# Patient Record
Sex: MALE | Race: WHITE | NOT HISPANIC OR LATINO | Employment: OTHER | URBAN - METROPOLITAN AREA
[De-identification: names, ages, dates, MRNs, and addresses within clinical notes are randomized per-mention and may not be internally consistent; named-entity substitution may affect disease eponyms.]

---

## 2023-04-04 LAB
ALBUMIN (G/DL) IN SER/PLAS: 4.2 G/DL (ref 3.4–5)
ANION GAP IN SER/PLAS: 10 MMOL/L (ref 10–20)
CALCIUM (MG/DL) IN SER/PLAS: 9.4 MG/DL (ref 8.6–10.3)
CARBON DIOXIDE, TOTAL (MMOL/L) IN SER/PLAS: 29 MMOL/L (ref 21–32)
CHLORIDE (MMOL/L) IN SER/PLAS: 93 MMOL/L (ref 98–107)
CREATININE (MG/DL) IN SER/PLAS: 0.96 MG/DL (ref 0.5–1.3)
GFR MALE: 85 ML/MIN/1.73M2
GLUCOSE (MG/DL) IN SER/PLAS: 92 MG/DL (ref 74–99)
PHOSPHATE (MG/DL) IN SER/PLAS: 3.6 MG/DL (ref 2.5–4.9)
POTASSIUM (MMOL/L) IN SER/PLAS: 4.9 MMOL/L (ref 3.5–5.3)
SODIUM (MMOL/L) IN SER/PLAS: 127 MMOL/L (ref 136–145)
UREA NITROGEN (MG/DL) IN SER/PLAS: 13 MG/DL (ref 6–23)

## 2023-04-08 LAB — OXCARB OR ESLICARB METABOLITE (MHD): 18 UG/ML (ref 3–35)

## 2023-04-21 LAB
ALBUMIN (G/DL) IN SER/PLAS: 4.1 G/DL (ref 3.4–5)
ANION GAP IN SER/PLAS: 13 MMOL/L (ref 10–20)
CALCIUM (MG/DL) IN SER/PLAS: 9.9 MG/DL (ref 8.6–10.3)
CARBON DIOXIDE, TOTAL (MMOL/L) IN SER/PLAS: 25 MMOL/L (ref 21–32)
CHLORIDE (MMOL/L) IN SER/PLAS: 104 MMOL/L (ref 98–107)
CREATININE (MG/DL) IN SER/PLAS: 1.2 MG/DL (ref 0.5–1.3)
GFR MALE: 65 ML/MIN/1.73M2
GLUCOSE (MG/DL) IN SER/PLAS: 93 MG/DL (ref 74–99)
PHOSPHATE (MG/DL) IN SER/PLAS: 3.6 MG/DL (ref 2.5–4.9)
POTASSIUM (MMOL/L) IN SER/PLAS: 4.7 MMOL/L (ref 3.5–5.3)
SODIUM (MMOL/L) IN SER/PLAS: 137 MMOL/L (ref 136–145)
UREA NITROGEN (MG/DL) IN SER/PLAS: 18 MG/DL (ref 6–23)

## 2023-04-25 LAB — ZONISAMIDE: 5.9 MCG/ML (ref 10–40)

## 2023-05-13 LAB — ZONISAMIDE: 14 MCG/ML (ref 10–40)

## 2023-05-23 ENCOUNTER — PATIENT OUTREACH (OUTPATIENT)
Dept: CARE COORDINATION | Facility: CLINIC | Age: 73
End: 2023-05-23
Payer: MEDICARE

## 2023-05-23 NOTE — PROGRESS NOTES
"Outreach call to patient's wife Nadeen to support a smooth transition of care from recent admission.  Spoke with Nadeen, reviewed discharge medications, discharge instructions, assessed social needs, and provided education on importance of follow-up appointment with provider.  Will continue to monitor through transition period.    Engagement  Call Start Time: 1128 (5/23/2023 11:34 AM)    Medications  Medications reviewed with patient/caregiver?: Yes (Reviewed with patient's wife Nadeen) (5/23/2023 11:34 AM)  Is the patient having any side effects they believe may be caused by any medication additions or changes?: (!) Yes (Patient's wife, Nadeen, states that he has a decreased appetite and he is \"pretty tired\"  Nadeen states they are going to talk to the doctor about his symptoms.) (5/23/2023 11:34 AM)  Does the patient have all medications ordered at discharge?: Yes (5/23/2023 11:34 AM)  Care Management Interventions: No intervention needed (5/23/2023 11:34 AM)  Is the patient taking all medications as directed (includes completed medication regime)?: Yes (5/23/2023 11:34 AM)    Appointments  Does the patient have a primary care provider?: Yes (Appointment scheduled for 6/1/2023) (5/23/2023 11:34 AM)  Care Management Interventions: Verified appointment date/time/provider (5/23/2023 11:34 AM)  Has the patient kept scheduled appointments due by today?: Yes (5/23/2023 11:34 AM)    Self Management  What is the home health agency?: Not Applicable (5/23/2023 11:34 AM)  What Durable Medical Equipment (DME) was ordered?: Not Applicable (5/23/2023 11:34 AM)    Patient Teaching  Does the patient have access to their discharge instructions?: -- (Reviewed with patient's wife Nadeen) (5/23/2023 11:34 AM)  Care Management Interventions: Reviewed instructions with patient (5/23/2023 11:34 AM)  What is the patient's perception of their health status since discharge?: Improving (5/23/2023 11:34 AM)  Is the " patient/caregiver able to teach back the hierarchy of who to call/visit for symptoms/problems? PCP, Specialist, Home Health nurse, Urgent Care, ED, 911: Yes (5/23/2023 11:34 AM)      NRB

## 2023-05-31 ENCOUNTER — PATIENT OUTREACH (OUTPATIENT)
Dept: CARE COORDINATION | Facility: CLINIC | Age: 73
End: 2023-05-31
Payer: MEDICARE

## 2023-05-31 NOTE — PROGRESS NOTES
"Outreach call to patient to support a smooth transition of care from recent admission. Patient presented to ED on 5/30/2023 with feelings of \"fogginess\" and generalized fatigue and weakness.  Patient reports that the symptoms started shortly after starting a new seizure medication 1 week ago.  Denies any seizures since starting the medication.  CM spoke with patient's wife Nadeen and states that Paddy is being weaned off of the Depakote and will be restarted on Keppra. Nadeen states he is still \"pretty much the same as when he went to the ED. Nadeen states they were told \"there will be a low before the high\" as they go through this transition.  Will continue to monitor through transition period. No needs at this time. CM will continue to follow.  NRB  "

## 2023-06-06 LAB
ALANINE AMINOTRANSFERASE (SGPT) (U/L) IN SER/PLAS: 23 U/L (ref 10–52)
ALBUMIN (G/DL) IN SER/PLAS: 4 G/DL (ref 3.4–5)
ALKALINE PHOSPHATASE (U/L) IN SER/PLAS: 86 U/L (ref 33–136)
ANION GAP IN SER/PLAS: 13 MMOL/L (ref 10–20)
ASPARTATE AMINOTRANSFERASE (SGOT) (U/L) IN SER/PLAS: 19 U/L (ref 9–39)
BILIRUBIN TOTAL (MG/DL) IN SER/PLAS: 0.1 MG/DL (ref 0–1.2)
CALCIUM (MG/DL) IN SER/PLAS: 9.6 MG/DL (ref 8.6–10.3)
CARBON DIOXIDE, TOTAL (MMOL/L) IN SER/PLAS: 27 MMOL/L (ref 21–32)
CHLORIDE (MMOL/L) IN SER/PLAS: 105 MMOL/L (ref 98–107)
CHOLESTEROL (MG/DL) IN SER/PLAS: 189 MG/DL (ref 0–199)
CHOLESTEROL IN HDL (MG/DL) IN SER/PLAS: 42.4 MG/DL
CHOLESTEROL/HDL RATIO: 4.5
CREATININE (MG/DL) IN SER/PLAS: 1.2 MG/DL (ref 0.5–1.3)
GFR MALE: 65 ML/MIN/1.73M2
GLUCOSE (MG/DL) IN SER/PLAS: 88 MG/DL (ref 74–99)
LDL: 113 MG/DL (ref 0–99)
POTASSIUM (MMOL/L) IN SER/PLAS: 4.2 MMOL/L (ref 3.5–5.3)
PROSTATE SPECIFIC AG (NG/ML) IN SER/PLAS: 1.78 NG/ML (ref 0–4)
PROTEIN TOTAL: 6.8 G/DL (ref 6.4–8.2)
SODIUM (MMOL/L) IN SER/PLAS: 141 MMOL/L (ref 136–145)
TRIGLYCERIDE (MG/DL) IN SER/PLAS: 166 MG/DL (ref 0–149)
UREA NITROGEN (MG/DL) IN SER/PLAS: 41 MG/DL (ref 6–23)
VLDL: 33 MG/DL (ref 0–40)

## 2023-06-09 ENCOUNTER — PATIENT OUTREACH (OUTPATIENT)
Dept: CARE COORDINATION | Facility: CLINIC | Age: 73
End: 2023-06-09
Payer: MEDICARE

## 2023-06-09 NOTE — PROGRESS NOTES
"Outreach follow up call.  CM spoke with spouse Nadeen. Nadeen states he is getting better each day. States he is still \" a little spacey as he is transitioning his medications.\" Nadeen states she is happy to see him get  better.  No questions at this time    Tayla Santo RN/EMELI    "

## 2023-06-19 LAB
ALBUMIN (G/DL) IN SER/PLAS: 3.7 G/DL (ref 3.4–5)
ANION GAP IN SER/PLAS: 13 MMOL/L (ref 10–20)
CALCIUM (MG/DL) IN SER/PLAS: 9.1 MG/DL (ref 8.6–10.3)
CARBON DIOXIDE, TOTAL (MMOL/L) IN SER/PLAS: 20 MMOL/L (ref 21–32)
CHLORIDE (MMOL/L) IN SER/PLAS: 109 MMOL/L (ref 98–107)
CREATININE (MG/DL) IN SER/PLAS: 1.06 MG/DL (ref 0.5–1.3)
GFR MALE: 75 ML/MIN/1.73M2
GLUCOSE (MG/DL) IN SER/PLAS: 88 MG/DL (ref 74–99)
PHOSPHATE (MG/DL) IN SER/PLAS: 3.4 MG/DL (ref 2.5–4.9)
POTASSIUM (MMOL/L) IN SER/PLAS: 4.3 MMOL/L (ref 3.5–5.3)
SODIUM (MMOL/L) IN SER/PLAS: 138 MMOL/L (ref 136–145)
UREA NITROGEN (MG/DL) IN SER/PLAS: 22 MG/DL (ref 6–23)

## 2023-06-23 ENCOUNTER — PATIENT OUTREACH (OUTPATIENT)
Dept: CARE COORDINATION | Facility: CLINIC | Age: 73
End: 2023-06-23
Payer: MEDICARE

## 2023-06-23 NOTE — PROGRESS NOTES
Outreach call to patient to support a smooth transition of care from recent admission.  Spoke with Nadeen, Paddy has completed the third medication (Depakote) and is now on the two he is suppose to be taking. Nadeen states, he is not longer confused or fuzzy. Nadeen states, he needs to get his energy back. Nadeen states, overall he is doing really good.  Will continue to monitor through transition period.    Tayla Santo RN/CM

## 2023-07-25 ENCOUNTER — PATIENT OUTREACH (OUTPATIENT)
Dept: CARE COORDINATION | Facility: CLINIC | Age: 73
End: 2023-07-25
Payer: MEDICARE

## 2023-07-25 NOTE — PROGRESS NOTES
Monthly outreach call to patient to support a smooth transition of care from recent admission.  Left voicemail message for patient with my contact information.Will continue to monitor through transition period.    Tayla Santo RN/CM

## 2023-08-24 ENCOUNTER — PATIENT OUTREACH (OUTPATIENT)
Dept: CARE COORDINATION | Facility: CLINIC | Age: 73
End: 2023-08-24
Payer: MEDICARE

## 2023-10-09 ENCOUNTER — TELEPHONE (OUTPATIENT)
Dept: NEUROLOGY | Facility: CLINIC | Age: 73
End: 2023-10-09
Payer: MEDICARE

## 2023-10-09 DIAGNOSIS — R56.9 SEIZURE (MULTI): Primary | ICD-10-CM

## 2023-10-09 RX ORDER — MIDAZOLAM 5 MG/.1ML
0.1 SPRAY NASAL
COMMUNITY
Start: 2023-05-16 | End: 2023-12-18 | Stop reason: WASHOUT

## 2023-10-09 RX ORDER — ZONISAMIDE 100 MG/1
300 CAPSULE ORAL DAILY
COMMUNITY
Start: 2023-08-03 | End: 2023-12-21 | Stop reason: SDUPTHER

## 2023-10-09 RX ORDER — LEVETIRACETAM 500 MG/1
1000 TABLET, EXTENDED RELEASE ORAL DAILY
COMMUNITY
Start: 2023-09-25 | End: 2023-11-27 | Stop reason: SDUPTHER

## 2023-10-09 RX ORDER — LANOLIN ALCOHOL/MO/W.PET/CERES
50 CREAM (GRAM) TOPICAL DAILY
Qty: 30 TABLET | Refills: 11 | Status: SHIPPED | OUTPATIENT
Start: 2023-10-09 | End: 2024-10-08

## 2023-10-09 NOTE — TELEPHONE ENCOUNTER
Patient's wife called d/t some irritability and depression patient has been experiencing. Started when he switched back to keppra but has been getting worse over the last few months. He has had many medication changes, along with hearing issues and loss of driving privileges that are likely contributing to mood.     We discussed the following options;  -Referral to psych: thinks this would upset patient   -Talking to PCP regarding depression: again doesn't think patient would be open to this  -switching from LEV to BRV : has been doing well seizure wise since going back to combo of LEV and ZNS and patient is looking forward to driving again soon so he wouldn't want to change meds at this time  -Adding B6 50mg daily to help with irritability : wife will discuss but she thinks he will be open to this - understands this will not help depression     -sent in B6 50mg daily - can increase to 100mg if needed   -has FUV with Dr. Briones in Nov

## 2023-11-20 DIAGNOSIS — I42.8 NON-ISCHEMIC CARDIOMYOPATHY (MULTI): ICD-10-CM

## 2023-11-20 DIAGNOSIS — I10 ESSENTIAL HYPERTENSION: ICD-10-CM

## 2023-11-27 ENCOUNTER — OFFICE VISIT (OUTPATIENT)
Dept: NEUROLOGY | Facility: HOSPITAL | Age: 73
End: 2023-11-27
Payer: MEDICARE

## 2023-11-27 ENCOUNTER — LAB (OUTPATIENT)
Dept: LAB | Facility: LAB | Age: 73
End: 2023-11-27
Payer: MEDICARE

## 2023-11-27 VITALS
HEART RATE: 79 BPM | SYSTOLIC BLOOD PRESSURE: 126 MMHG | WEIGHT: 148 LBS | HEIGHT: 70 IN | BODY MASS INDEX: 21.19 KG/M2 | RESPIRATION RATE: 18 BRPM | DIASTOLIC BLOOD PRESSURE: 88 MMHG

## 2023-11-27 DIAGNOSIS — R56.9 SEIZURE (MULTI): ICD-10-CM

## 2023-11-27 DIAGNOSIS — R56.9 SEIZURE (MULTI): Primary | ICD-10-CM

## 2023-11-27 LAB — LEVETIRACETAM SERPL-MCNC: 12 UG/ML (ref 10–40)

## 2023-11-27 PROCEDURE — 80177 DRUG SCRN QUAN LEVETIRACETAM: CPT

## 2023-11-27 PROCEDURE — 36415 COLL VENOUS BLD VENIPUNCTURE: CPT

## 2023-11-27 PROCEDURE — 1159F MED LIST DOCD IN RCRD: CPT | Performed by: STUDENT IN AN ORGANIZED HEALTH CARE EDUCATION/TRAINING PROGRAM

## 2023-11-27 PROCEDURE — 99214 OFFICE O/P EST MOD 30 MIN: CPT | Performed by: STUDENT IN AN ORGANIZED HEALTH CARE EDUCATION/TRAINING PROGRAM

## 2023-11-27 PROCEDURE — 1036F TOBACCO NON-USER: CPT | Performed by: STUDENT IN AN ORGANIZED HEALTH CARE EDUCATION/TRAINING PROGRAM

## 2023-11-27 PROCEDURE — 1126F AMNT PAIN NOTED NONE PRSNT: CPT | Performed by: STUDENT IN AN ORGANIZED HEALTH CARE EDUCATION/TRAINING PROGRAM

## 2023-11-27 RX ORDER — CARVEDILOL 25 MG/1
25 TABLET ORAL DAILY
COMMUNITY
End: 2023-12-18 | Stop reason: WASHOUT

## 2023-11-27 RX ORDER — LEVETIRACETAM 500 MG/1
1000 TABLET, EXTENDED RELEASE ORAL DAILY
Qty: 60 TABLET | Refills: 11 | Status: SHIPPED | OUTPATIENT
Start: 2023-11-27 | End: 2023-12-28 | Stop reason: SDUPTHER

## 2023-11-28 NOTE — PROGRESS NOTES
Follow up      History of Present Illness:      Mr. Paddy Solis is a 71 YO M w/ PMH of HFrEF 2/2 NICM, Atrial fibrillation, and BL Temporal lobe epilepsy.      Relevant recent history:    Semiology description in previous notes.      Admitted to EMU from 2/25/23 until 3/1/23. During that admission with  mg BID was downtitrated to 150 mg BID. Seizures were subsequently captured from right and left temporal lobes. Patient was discharged on  mg BID and MRI brain HARNESS protocol was obtained prior to discharge.     On 3/6/23 patient was seen by Mirna Giraldo and noted to be stable on new dose. There was some concern for hyponatremia 134 > 127 patient was to trial some degree of fluid restriction.     Seen in clinic by Mirna Giraldo on 4/12/23. It is implied that due to the hyponatremia decision was made to start Zonesamide and to taper off OXC.     5/19-5/21:ransferred for c/f status epilepticus, 5 GTC reported wo returned to baseline. He was on zonisamide 300mg qHS. Started on Depakote 500mg DR BID but on 5/30: presented with cognitive side effects from depakote and switched to keppra XR 1000 mg qhs     MRI brain 3/2023 on personal review shows some white mater changes 2/2 to microvascular disease on FLAIR axial. On the FLAIR coronal sequence there is some hyperintense signal from the BL mesial temporal lobes L > R. Looking through T1 and T2 it appears in the L anterior temporal lobe lateral to the amygdala there is an area where there is blurring of the grey-white matter differentiation.         EPILEPTIC Paroxysmal Episodes   Semiology:   1. Automotor > R Version > GTC w/ pos ictal psychosis** (left temporal onset)  2. Automotor seizure (D) (right temporal onset)  - Onset: April of 2021, possibly 1 year earlier (episodes of confusion)  - Frequency: **None since 4/23   - Last seizure: 4/8/23  EZ: Left temporal   Etiology: Unknown   Comorbidities: HFrEF, NICM, A-Fib on Eliquis      Prior AEDs:  OXC  (hyponatremia), Depakote Dr -cognitive side effects  Current AEDs (last level): ZNS last 4/23 was low (5.9), ZNS was increased to 300 mg qhs. Keppra XR 1000 mg at bedtime/      EEG (3/2023): EMU with Sz from right and left temporal lobes  MRI w/wo (3/2023): As above  Handedness: RH     Impressions:  Patient with bi-temporal lobe epilepsy of unclear etiology. No further seizures since May/2023 and he is willing to start driving again. Probably in January.    Per wife his mood is better controlled.     Otherwise healthy individual there would be a consideration of surgical resection of his L sided lesion however, given the patient's age and low cardiac function (non-ischemic cardiomyopathy w/ EF of 35%) and surgical management of his seizures would be a scenario where risk outweighs benefit.     I would prefer  to aggressively medically treat this patient's condition including maintaining a moderate-high serum concentration of ASM regardless of the number of clinical events the patient is experiencing.   Takes medication at 9 pm and keppra level today is therapeutic  12 at 16:37    Plan:  -Continue on  mg at bedtime and Lev XR 1000 mg at bedtime.   - Cont B6 100 mg   -levels of keppra were ordered        I spent 30 minutes in the professional and overall care of this patient.    Angel Nash MD

## 2023-12-08 DIAGNOSIS — R56.9 SEIZURE (MULTI): Primary | ICD-10-CM

## 2023-12-08 DIAGNOSIS — F32.A DEPRESSION, UNSPECIFIED DEPRESSION TYPE: ICD-10-CM

## 2023-12-08 RX ORDER — ESCITALOPRAM OXALATE 5 MG/1
5 TABLET ORAL DAILY
Qty: 30 TABLET | Refills: 2 | Status: SHIPPED | OUTPATIENT
Start: 2023-12-08 | End: 2023-12-18 | Stop reason: WASHOUT

## 2023-12-10 RX ORDER — CARVEDILOL 25 MG/1
25 TABLET ORAL 2 TIMES DAILY
Qty: 200 TABLET | Refills: 2 | Status: SHIPPED | OUTPATIENT
Start: 2023-12-10

## 2023-12-14 PROBLEM — I34.0 MITRAL VALVE REGURGITATION: Status: ACTIVE | Noted: 2023-12-14

## 2023-12-14 PROBLEM — I10 ESSENTIAL HYPERTENSION: Status: ACTIVE | Noted: 2023-12-14

## 2023-12-14 PROBLEM — I48.91 ATRIAL FIBRILLATION (MULTI): Status: ACTIVE | Noted: 2023-12-14

## 2023-12-14 PROBLEM — G40.909 SEIZURE DISORDER (MULTI): Status: ACTIVE | Noted: 2023-12-14

## 2023-12-14 PROBLEM — I42.8 NON-ISCHEMIC CARDIOMYOPATHY (MULTI): Status: ACTIVE | Noted: 2023-12-14

## 2023-12-14 RX ORDER — LANOLIN ALCOHOL/MO/W.PET/CERES
1 CREAM (GRAM) TOPICAL DAILY
COMMUNITY
Start: 2021-04-10

## 2023-12-14 RX ORDER — CEFDINIR 300 MG/1
300 CAPSULE ORAL EVERY 12 HOURS
COMMUNITY
Start: 2023-06-26 | End: 2023-12-18 | Stop reason: WASHOUT

## 2023-12-14 RX ORDER — CLONAZEPAM 2 MG/1
2 TABLET, ORALLY DISINTEGRATING ORAL DAILY
COMMUNITY
Start: 2023-05-21 | End: 2023-12-18 | Stop reason: WASHOUT

## 2023-12-14 RX ORDER — POLYETHYLENE GLYCOL 3350, SODIUM CHLORIDE, SODIUM BICARBONATE, POTASSIUM CHLORIDE 420; 11.2; 5.72; 1.48 G/4L; G/4L; G/4L; G/4L
POWDER, FOR SOLUTION ORAL
COMMUNITY
Start: 2023-08-08 | End: 2023-12-18 | Stop reason: WASHOUT

## 2023-12-18 ENCOUNTER — OFFICE VISIT (OUTPATIENT)
Dept: CARDIOLOGY | Facility: CLINIC | Age: 73
End: 2023-12-18
Payer: MEDICARE

## 2023-12-18 VITALS
SYSTOLIC BLOOD PRESSURE: 134 MMHG | HEIGHT: 70 IN | DIASTOLIC BLOOD PRESSURE: 84 MMHG | WEIGHT: 135.3 LBS | BODY MASS INDEX: 19.37 KG/M2 | HEART RATE: 70 BPM

## 2023-12-18 DIAGNOSIS — Z78.9 NEVER SMOKED TOBACCO: ICD-10-CM

## 2023-12-18 DIAGNOSIS — I34.0 NONRHEUMATIC MITRAL VALVE REGURGITATION: ICD-10-CM

## 2023-12-18 DIAGNOSIS — I48.0 PAROXYSMAL ATRIAL FIBRILLATION (MULTI): Primary | ICD-10-CM

## 2023-12-18 DIAGNOSIS — I42.8 NON-ISCHEMIC CARDIOMYOPATHY (MULTI): ICD-10-CM

## 2023-12-18 DIAGNOSIS — I10 ESSENTIAL HYPERTENSION: ICD-10-CM

## 2023-12-18 DIAGNOSIS — G40.909 SEIZURE DISORDER (MULTI): ICD-10-CM

## 2023-12-18 PROCEDURE — 99214 OFFICE O/P EST MOD 30 MIN: CPT | Performed by: INTERNAL MEDICINE

## 2023-12-18 PROCEDURE — 1126F AMNT PAIN NOTED NONE PRSNT: CPT | Performed by: INTERNAL MEDICINE

## 2023-12-18 PROCEDURE — 1159F MED LIST DOCD IN RCRD: CPT | Performed by: INTERNAL MEDICINE

## 2023-12-18 PROCEDURE — 3075F SYST BP GE 130 - 139MM HG: CPT | Performed by: INTERNAL MEDICINE

## 2023-12-18 PROCEDURE — 3079F DIAST BP 80-89 MM HG: CPT | Performed by: INTERNAL MEDICINE

## 2023-12-18 PROCEDURE — 3008F BODY MASS INDEX DOCD: CPT | Performed by: INTERNAL MEDICINE

## 2023-12-18 PROCEDURE — 1036F TOBACCO NON-USER: CPT | Performed by: INTERNAL MEDICINE

## 2023-12-18 RX ORDER — NAPROXEN SODIUM 220 MG/1
81 TABLET, FILM COATED ORAL DAILY
Qty: 30 TABLET | Refills: 11 | OUTPATIENT
Start: 2023-12-18 | End: 2024-12-17

## 2023-12-18 ASSESSMENT — ENCOUNTER SYMPTOMS
CONSTITUTIONAL NEGATIVE: 1
IRREGULAR HEARTBEAT: 1
RESPIRATORY NEGATIVE: 1
NEUROLOGICAL NEGATIVE: 1

## 2023-12-18 NOTE — PROGRESS NOTES
CARDIOLOGY OFFICE VISIT      CHIEF COMPLAINT  Chief Complaint   Patient presents with    Follow-up     Patient presented today for a 6 month follow up.          HISTORY OF PRESENT ILLNESS  Paddy Solis is a 73 y.o. year old male patient with persistent atrial fibrillation who had failed multiple cardioversion, nonischemic cardiomyopathy with EF of about 30 to 35% and moderate to severe mitral regurgitation.  Cardiac catheterization showed normal coronaries.  He also has a history of seizure disorder which has been stable.  He has been doing well denies any chest pain significant shortness of breath.  Last echo from May 2022 shows EF is slightly improved at 35 to 40% and the mitral regurgitation is moderate instead of severe.  Continues to be asymptomatic denying any chest pain and he continues to stay active with no significant limitations.  Patient is requesting his Eliquis to be reduced in half because of complaints of cracked skin and increased bleeding from his fingers.  I did explain to him the increased risk of stroke with the lower dose of Eliquis but he said he is willing to assume this risk.    ASSESSMENT AND PLAN  1.  Persistent A-fib: His rate is well-controlled on current medications and per patient request, we will reduce Eliquis to 2.5 mg twice a day with his understanding of increased risk of thromboembolism.  2.  Nonischemic cardiomyopathy: Probably secondary to chronic alcohol abuse and persistent A-fib.  EF is improved as noted above, will continue current medications.  3.  Hypertension: Blood pressure is well-controlled current medications.  4.  Dyslipidemia: We will get repeat lipids and LFTs prior to his next follow-up.    Problem List Items Addressed This Visit       Atrial fibrillation (CMS/HCC) - Primary    Essential hypertension    Mitral valve regurgitation    Non-ischemic cardiomyopathy (CMS/HCC)    Seizure disorder (CMS/HCC)    Adult BMI <19 kg/sq m    Never smoked tobacco       Recent  Cardiovascular Testing:    Echo-  Stress-  Cath-  Carotid Ultrasound-    Past Medical History  Past Medical History:   Diagnosis Date    Encounter for follow-up examination after completed treatment for conditions other than malignant neoplasm     Hospital discharge follow-up    Encounter for follow-up examination after completed treatment for conditions other than malignant neoplasm     Follow-up exam after completing high-risk medication    Encounter for screening for diabetes mellitus     Diabetes mellitus screening    Impingement syndrome of unspecified shoulder     Shoulder impingement syndrome    Pain in left finger(s) 05/20/2021    Pain of left middle finger    Personal history of other diseases of the nervous system and sense organs 05/20/2021    History of impacted cerumen    Personal history of other diseases of the nervous system and sense organs     H/O impacted cerumen    Personal history of other diseases of the respiratory system 05/20/2021    History of acute bronchitis    Personal history of other diseases of the respiratory system     History of acute bronchitis    Personal history of other specified conditions 06/01/2021    History of seizure       Social History  Social History     Tobacco Use    Smoking status: Never     Passive exposure: Never    Smokeless tobacco: Never   Substance Use Topics    Alcohol use: Not Currently    Drug use: Yes     Types: Marijuana       Family History   No family history on file.     Allergies:  Allergies   Allergen Reactions    Naproxen Hives        Outpatient Medications:  Current Outpatient Medications   Medication Instructions    carvedilol (COREG) 25 mg, oral, 2 times daily    levETIRAcetam XR (KEPPRA XR) 1,000 mg, oral, Daily    magnesium oxide (Mag-Ox) 400 mg (241.3 mg magnesium) tablet 1 tablet, oral, Daily    pyridoxine (VITAMIN B-6) 50 mg, oral, Daily    zonisamide (ZONEGRAN) 300 mg, oral, Daily        Recent Lab Results:    CBC:   Lab Results   Component  "Value Date    WBC 10.8 05/30/2023    RBC 5.42 05/30/2023    HGB 17.0 05/30/2023    HCT 50.4 05/30/2023     05/30/2023        CMP:    Lab Results   Component Value Date     06/19/2023    K 4.3 06/19/2023     (H) 06/19/2023    CO2 20 (L) 06/19/2023    BUN 22 06/19/2023    CREATININE 1.06 06/19/2023    GLUCOSE 88 06/19/2023    CALCIUM 9.1 06/19/2023       Magnesium:    Lab Results   Component Value Date    MG 2.17 05/21/2023       Lipid Profile:    Lab Results   Component Value Date    TRIG 166 (H) 06/06/2023    HDL 42.4 06/06/2023       TSH:    Lab Results   Component Value Date    TSH 1.06 09/27/2021       BNP:   Lab Results   Component Value Date     (H) 06/01/2021        PT/INR:    Lab Results   Component Value Date    PROTIME 13.1 05/19/2023    INR 1.1 05/19/2023       HgBA1c:    No results found for: \"HGBA1C\"    BMP:  Lab Results   Component Value Date     06/19/2023     06/06/2023     05/30/2023    K 4.3 06/19/2023    K 4.2 06/06/2023    K 3.8 05/30/2023     (H) 06/19/2023     06/06/2023     05/30/2023    CO2 20 (L) 06/19/2023    CO2 27 06/06/2023    CO2 24 05/30/2023    BUN 22 06/19/2023    BUN 41 (H) 06/06/2023    BUN 23 05/30/2023    CREATININE 1.06 06/19/2023    CREATININE 1.20 06/06/2023    CREATININE 1.13 05/30/2023       CBC:  Lab Results   Component Value Date    WBC 10.8 05/30/2023    WBC 12.8 (H) 05/21/2023    WBC 11.8 (H) 05/20/2023    RBC 5.42 05/30/2023    RBC 5.52 05/21/2023    RBC 5.15 05/20/2023    HGB 17.0 05/30/2023    HGB 16.8 05/21/2023    HGB 16.1 05/20/2023    HCT 50.4 05/30/2023    HCT 50.3 05/21/2023    HCT 47.6 05/20/2023    MCV 93 05/30/2023    MCV 91 05/21/2023    MCV 92 05/20/2023    MCHC 33.7 05/30/2023    MCHC 33.4 05/21/2023    MCHC 33.8 05/20/2023    RDW 13.9 05/30/2023    RDW 14.2 05/21/2023    RDW 14.0 05/20/2023     05/30/2023     05/21/2023     (L) 05/20/2023       Cardiac Enzymes:    Lab " Results   Component Value Date    TROPHS 7 10/10/2022    TROPHS 7 10/10/2022       Hepatic Function Panel:    Lab Results   Component Value Date    ALKPHOS 86 06/06/2023    ALT 23 06/06/2023    AST 19 06/06/2023    PROT 6.8 06/06/2023    BILITOT 0.1 06/06/2023         REVIEW OF SYSTEMS  Review of Systems   Constitutional: Negative.   Cardiovascular:  Positive for irregular heartbeat.   Respiratory: Negative.     Neurological: Negative.    All other systems reviewed and are negative.      VITALS  Vitals:    12/18/23 0825   BP: 134/84   Pulse: 70     Wt Readings from Last 4 Encounters:   12/18/23 61.4 kg (135 lb 4.8 oz)   11/27/23 67.1 kg (148 lb)   06/26/23 68.5 kg (151 lb)   06/19/23 69.2 kg (152 lb 9 oz)       PHYSICAL EXAM  Constitutional:       Appearance: Healthy appearance.   Eyes:      Pupils: Pupils are equal, round, and reactive to light.   Pulmonary:      Effort: Pulmonary effort is normal.      Breath sounds: Normal breath sounds.   Cardiovascular:      PMI at left midclavicular line. Normal rate. Irregularly irregular rhythm.      Murmurs: There is no murmur.      No gallop.  No click. No rub.   Pulses:     Intact distal pulses.   Musculoskeletal: Normal range of motion.      Cervical back: Normal range of motion. Skin:     General: Skin is warm and dry.   Neurological:      General: No focal deficit present.      Mental Status: Alert and oriented to person, place and time.

## 2023-12-21 DIAGNOSIS — G40.909 SEIZURE DISORDER (MULTI): Primary | ICD-10-CM

## 2023-12-21 RX ORDER — ZONISAMIDE 100 MG/1
300 CAPSULE ORAL DAILY
Qty: 270 CAPSULE | Refills: 3 | Status: SHIPPED | OUTPATIENT
Start: 2023-12-21 | End: 2023-12-28 | Stop reason: SDUPTHER

## 2023-12-28 DIAGNOSIS — R56.9 SEIZURE (MULTI): ICD-10-CM

## 2023-12-28 DIAGNOSIS — G40.909 SEIZURE DISORDER (MULTI): ICD-10-CM

## 2023-12-28 RX ORDER — ZONISAMIDE 100 MG/1
300 CAPSULE ORAL DAILY
Qty: 270 CAPSULE | Refills: 3 | Status: SHIPPED | OUTPATIENT
Start: 2023-12-28 | End: 2024-12-27

## 2023-12-28 RX ORDER — LEVETIRACETAM 500 MG/1
1000 TABLET, EXTENDED RELEASE ORAL DAILY
Qty: 60 TABLET | Refills: 11 | Status: SHIPPED | OUTPATIENT
Start: 2023-12-28 | End: 2024-12-27

## 2024-01-14 DIAGNOSIS — I48.0 PAROXYSMAL ATRIAL FIBRILLATION (MULTI): ICD-10-CM

## 2024-01-18 RX ORDER — APIXABAN 5 MG/1
5 TABLET, FILM COATED ORAL 2 TIMES DAILY
Qty: 200 TABLET | Refills: 2 | Status: SHIPPED | OUTPATIENT
Start: 2024-01-18

## 2024-02-13 DIAGNOSIS — F32.A DEPRESSION, UNSPECIFIED DEPRESSION TYPE: ICD-10-CM

## 2024-02-13 DIAGNOSIS — F32.A DEPRESSION, UNSPECIFIED DEPRESSION TYPE: Primary | ICD-10-CM

## 2024-02-13 RX ORDER — ESCITALOPRAM OXALATE 5 MG/1
5 TABLET ORAL DAILY
Qty: 30 TABLET | Refills: 11 | Status: SHIPPED | OUTPATIENT
Start: 2024-02-13 | End: 2025-02-12

## 2024-02-13 RX ORDER — ESCITALOPRAM OXALATE 5 MG/1
5 TABLET ORAL DAILY
Qty: 30 TABLET | Refills: 2 | OUTPATIENT
Start: 2024-02-13

## 2024-06-17 ENCOUNTER — APPOINTMENT (OUTPATIENT)
Dept: CARDIOLOGY | Facility: CLINIC | Age: 74
End: 2024-06-17
Payer: MEDICARE

## 2024-06-26 ENCOUNTER — APPOINTMENT (OUTPATIENT)
Dept: CARDIOLOGY | Facility: CLINIC | Age: 74
End: 2024-06-26
Payer: MEDICARE

## 2024-07-17 ENCOUNTER — APPOINTMENT (OUTPATIENT)
Dept: CARDIOLOGY | Facility: CLINIC | Age: 74
End: 2024-07-17
Payer: MEDICARE

## 2024-08-18 DIAGNOSIS — I42.8 NON-ISCHEMIC CARDIOMYOPATHY (MULTI): ICD-10-CM

## 2024-08-18 DIAGNOSIS — I10 ESSENTIAL HYPERTENSION: ICD-10-CM

## 2024-08-20 RX ORDER — CARVEDILOL 25 MG/1
25 TABLET ORAL 2 TIMES DAILY
Qty: 200 TABLET | Refills: 2 | Status: SHIPPED | OUTPATIENT
Start: 2024-08-20

## 2024-09-04 ENCOUNTER — APPOINTMENT (OUTPATIENT)
Dept: CARDIOLOGY | Facility: CLINIC | Age: 74
End: 2024-09-04
Payer: MEDICARE

## 2024-09-20 ENCOUNTER — APPOINTMENT (OUTPATIENT)
Dept: RADIOLOGY | Facility: HOSPITAL | Age: 74
End: 2024-09-20
Payer: MEDICARE

## 2024-09-20 ENCOUNTER — HOSPITAL ENCOUNTER (EMERGENCY)
Facility: HOSPITAL | Age: 74
Discharge: OTHER INSTITUTION | End: 2024-09-21
Attending: STUDENT IN AN ORGANIZED HEALTH CARE EDUCATION/TRAINING PROGRAM
Payer: MEDICARE

## 2024-09-20 DIAGNOSIS — G40.901 STATUS EPILEPTICUS (MULTI): Primary | ICD-10-CM

## 2024-09-20 DIAGNOSIS — J96.90 RESPIRATORY FAILURE, UNSPECIFIED CHRONICITY, UNSPECIFIED WHETHER WITH HYPOXIA OR HYPERCAPNIA (MULTI): ICD-10-CM

## 2024-09-20 PROCEDURE — 31500 INSERT EMERGENCY AIRWAY: CPT | Performed by: STUDENT IN AN ORGANIZED HEALTH CARE EDUCATION/TRAINING PROGRAM

## 2024-09-20 PROCEDURE — 87040 BLOOD CULTURE FOR BACTERIA: CPT | Mod: ELYLAB | Performed by: PHYSICIAN ASSISTANT

## 2024-09-20 PROCEDURE — 85610 PROTHROMBIN TIME: CPT | Performed by: PHYSICIAN ASSISTANT

## 2024-09-20 PROCEDURE — 83690 ASSAY OF LIPASE: CPT | Performed by: PHYSICIAN ASSISTANT

## 2024-09-20 PROCEDURE — 99291 CRITICAL CARE FIRST HOUR: CPT | Performed by: PHYSICIAN ASSISTANT

## 2024-09-20 PROCEDURE — 94002 VENT MGMT INPAT INIT DAY: CPT

## 2024-09-20 PROCEDURE — 80177 DRUG SCRN QUAN LEVETIRACETAM: CPT | Mod: ELYLAB | Performed by: PHYSICIAN ASSISTANT

## 2024-09-20 PROCEDURE — 85025 COMPLETE CBC W/AUTO DIFF WBC: CPT | Performed by: PHYSICIAN ASSISTANT

## 2024-09-20 PROCEDURE — 71045 X-RAY EXAM CHEST 1 VIEW: CPT

## 2024-09-20 PROCEDURE — 84484 ASSAY OF TROPONIN QUANT: CPT | Performed by: PHYSICIAN ASSISTANT

## 2024-09-20 PROCEDURE — 80143 DRUG ASSAY ACETAMINOPHEN: CPT | Performed by: PHYSICIAN ASSISTANT

## 2024-09-20 PROCEDURE — 80053 COMPREHEN METABOLIC PANEL: CPT | Performed by: PHYSICIAN ASSISTANT

## 2024-09-20 PROCEDURE — 83735 ASSAY OF MAGNESIUM: CPT | Performed by: PHYSICIAN ASSISTANT

## 2024-09-20 PROCEDURE — 36415 COLL VENOUS BLD VENIPUNCTURE: CPT | Performed by: PHYSICIAN ASSISTANT

## 2024-09-20 PROCEDURE — 83605 ASSAY OF LACTIC ACID: CPT | Performed by: PHYSICIAN ASSISTANT

## 2024-09-20 RX ORDER — ONDANSETRON HYDROCHLORIDE 2 MG/ML
4 INJECTION, SOLUTION INTRAVENOUS ONCE
Status: COMPLETED | OUTPATIENT
Start: 2024-09-20 | End: 2024-09-21

## 2024-09-20 RX ORDER — PROPOFOL 10 MG/ML
INJECTION, EMULSION INTRAVENOUS
Status: COMPLETED
Start: 2024-09-20 | End: 2024-09-21

## 2024-09-20 RX ORDER — VANCOMYCIN HYDROCHLORIDE 1 G/200ML
1000 INJECTION, SOLUTION INTRAVENOUS ONCE
Status: COMPLETED | OUTPATIENT
Start: 2024-09-20 | End: 2024-09-21

## 2024-09-20 RX ORDER — LEVETIRACETAM 10 MG/ML
1000 INJECTION INTRAVASCULAR ONCE
Status: COMPLETED | OUTPATIENT
Start: 2024-09-20 | End: 2024-09-21

## 2024-09-20 RX ADMIN — KETAMINE HYDROCHLORIDE 59 MG: 50 INJECTION INTRAMUSCULAR; INTRAVENOUS at 23:46

## 2024-09-20 RX ADMIN — ROCURONIUM BROMIDE 59 MG: 50 INJECTION, SOLUTION INTRAVENOUS at 23:47

## 2024-09-21 ENCOUNTER — APPOINTMENT (OUTPATIENT)
Dept: NEUROLOGY | Facility: HOSPITAL | Age: 74
DRG: 100 | End: 2024-09-21
Payer: MEDICARE

## 2024-09-21 ENCOUNTER — APPOINTMENT (OUTPATIENT)
Dept: CARDIOLOGY | Facility: HOSPITAL | Age: 74
DRG: 100 | End: 2024-09-21
Payer: MEDICARE

## 2024-09-21 ENCOUNTER — APPOINTMENT (OUTPATIENT)
Dept: RADIOLOGY | Facility: HOSPITAL | Age: 74
End: 2024-09-21
Payer: MEDICARE

## 2024-09-21 ENCOUNTER — APPOINTMENT (OUTPATIENT)
Dept: RADIOLOGY | Facility: HOSPITAL | Age: 74
DRG: 100 | End: 2024-09-21
Payer: MEDICARE

## 2024-09-21 ENCOUNTER — HOSPITAL ENCOUNTER (INPATIENT)
Facility: HOSPITAL | Age: 74
DRG: 100 | End: 2024-09-21
Attending: PSYCHIATRY & NEUROLOGY | Admitting: PSYCHIATRY & NEUROLOGY
Payer: MEDICARE

## 2024-09-21 ENCOUNTER — APPOINTMENT (OUTPATIENT)
Dept: CARDIOLOGY | Facility: HOSPITAL | Age: 74
End: 2024-09-21
Payer: MEDICARE

## 2024-09-21 VITALS
TEMPERATURE: 98.6 F | RESPIRATION RATE: 17 BRPM | HEIGHT: 70 IN | DIASTOLIC BLOOD PRESSURE: 69 MMHG | BODY MASS INDEX: 18.61 KG/M2 | OXYGEN SATURATION: 99 % | SYSTOLIC BLOOD PRESSURE: 111 MMHG | WEIGHT: 130 LBS | HEART RATE: 72 BPM

## 2024-09-21 DIAGNOSIS — G40.901 STATUS EPILEPTICUS (MULTI): Primary | ICD-10-CM

## 2024-09-21 DIAGNOSIS — G40.909 SEIZURE DISORDER (MULTI): ICD-10-CM

## 2024-09-21 DIAGNOSIS — I34.0 MITRAL VALVE INSUFFICIENCY, UNSPECIFIED ETIOLOGY: ICD-10-CM

## 2024-09-21 LAB
ALBUMIN SERPL BCP-MCNC: 3.5 G/DL (ref 3.4–5)
ALBUMIN SERPL BCP-MCNC: 4.3 G/DL (ref 3.4–5)
ALP SERPL-CCNC: 97 U/L (ref 33–136)
ALT SERPL W P-5'-P-CCNC: 12 U/L (ref 10–52)
AMPHETAMINES UR QL SCN: ABNORMAL
ANION GAP SERPL CALC-SCNC: 14 MMOL/L (ref 10–20)
ANION GAP SERPL CALC-SCNC: 16 MMOL/L (ref 10–20)
APAP SERPL-MCNC: <10 UG/ML
APPEARANCE UR: ABNORMAL
APTT PPP: 26 SECONDS (ref 27–38)
AST SERPL W P-5'-P-CCNC: 18 U/L (ref 9–39)
ATRIAL RATE: 357 BPM
ATRIAL RATE: 78 BPM
BARBITURATES UR QL SCN: ABNORMAL
BASE EXCESS BLDV CALC-SCNC: -5.8 MMOL/L (ref -2–3)
BASE EXCESS BLDV CALC-SCNC: -8 MMOL/L (ref -2–3)
BASOPHILS # BLD AUTO: 0.03 X10*3/UL (ref 0–0.1)
BASOPHILS # BLD AUTO: 0.06 X10*3/UL (ref 0–0.1)
BASOPHILS NFR BLD AUTO: 0.3 %
BASOPHILS NFR BLD AUTO: 0.6 %
BENZODIAZ UR QL SCN: ABNORMAL
BILIRUB SERPL-MCNC: 0.6 MG/DL (ref 0–1.2)
BILIRUB UR STRIP.AUTO-MCNC: NEGATIVE MG/DL
BODY TEMPERATURE: ABNORMAL
BODY TEMPERATURE: ABNORMAL
BUN SERPL-MCNC: 15 MG/DL (ref 6–23)
BUN SERPL-MCNC: 18 MG/DL (ref 6–23)
BZE UR QL SCN: ABNORMAL
CA-I BLD-SCNC: 1.16 MMOL/L (ref 1.1–1.33)
CALCIUM SERPL-MCNC: 8.7 MG/DL (ref 8.6–10.6)
CALCIUM SERPL-MCNC: 9.5 MG/DL (ref 8.6–10.3)
CANNABINOIDS UR QL SCN: ABNORMAL
CARDIAC TROPONIN I PNL SERPL HS: 11 NG/L (ref 0–20)
CARDIAC TROPONIN I PNL SERPL HS: 8 NG/L (ref 0–20)
CHLORIDE SERPL-SCNC: 103 MMOL/L (ref 98–107)
CHLORIDE SERPL-SCNC: 106 MMOL/L (ref 98–107)
CO2 SERPL-SCNC: 22 MMOL/L (ref 21–32)
CO2 SERPL-SCNC: 24 MMOL/L (ref 21–32)
COLOR UR: ABNORMAL
CREAT SERPL-MCNC: 1.18 MG/DL (ref 0.5–1.3)
CREAT SERPL-MCNC: 1.19 MG/DL (ref 0.5–1.3)
CRITICAL CALL TIME: 328
CRITICAL CALL TIME: 33
CRITICAL CALLED BY: ABNORMAL
CRITICAL CALLED BY: ABNORMAL
CRITICAL CALLED TO: ABNORMAL
CRITICAL CALLED TO: ABNORMAL
CRITICAL READ BACK: ABNORMAL
CRITICAL READ BACK: ABNORMAL
EGFRCR SERPLBLD CKD-EPI 2021: 65 ML/MIN/1.73M*2
EGFRCR SERPLBLD CKD-EPI 2021: 66 ML/MIN/1.73M*2
EOSINOPHIL # BLD AUTO: 0.01 X10*3/UL (ref 0–0.4)
EOSINOPHIL # BLD AUTO: 0.03 X10*3/UL (ref 0–0.4)
EOSINOPHIL NFR BLD AUTO: 0.1 %
EOSINOPHIL NFR BLD AUTO: 0.3 %
ERYTHROCYTE [DISTWIDTH] IN BLOOD BY AUTOMATED COUNT: 13.4 % (ref 11.5–14.5)
ERYTHROCYTE [DISTWIDTH] IN BLOOD BY AUTOMATED COUNT: 13.5 % (ref 11.5–14.5)
ETHANOL SERPL-MCNC: <10 MG/DL
FENTANYL+NORFENTANYL UR QL SCN: ABNORMAL
GLUCOSE BLD MANUAL STRIP-MCNC: 105 MG/DL (ref 74–99)
GLUCOSE BLD MANUAL STRIP-MCNC: 89 MG/DL (ref 74–99)
GLUCOSE BLD MANUAL STRIP-MCNC: 91 MG/DL (ref 74–99)
GLUCOSE BLD MANUAL STRIP-MCNC: 93 MG/DL (ref 74–99)
GLUCOSE SERPL-MCNC: 195 MG/DL (ref 74–99)
GLUCOSE SERPL-MCNC: 86 MG/DL (ref 74–99)
GLUCOSE UR STRIP.AUTO-MCNC: NORMAL MG/DL
HCO3 BLDV-SCNC: 21.4 MMOL/L (ref 22–26)
HCO3 BLDV-SCNC: 22.2 MMOL/L (ref 22–26)
HCT VFR BLD AUTO: 43.9 % (ref 41–52)
HCT VFR BLD AUTO: 48.6 % (ref 41–52)
HGB BLD-MCNC: 14.9 G/DL (ref 13.5–17.5)
HGB BLD-MCNC: 16 G/DL (ref 13.5–17.5)
HOLD SPECIMEN: NORMAL
IMM GRANULOCYTES # BLD AUTO: 0.04 X10*3/UL (ref 0–0.5)
IMM GRANULOCYTES # BLD AUTO: 0.04 X10*3/UL (ref 0–0.5)
IMM GRANULOCYTES NFR BLD AUTO: 0.3 % (ref 0–0.9)
IMM GRANULOCYTES NFR BLD AUTO: 0.4 % (ref 0–0.9)
INHALED O2 CONCENTRATION: 40 %
INHALED O2 CONCENTRATION: 60 %
INR PPP: 1.1 (ref 0.9–1.1)
INR PPP: 1.2 (ref 0.9–1.1)
KETONES UR STRIP.AUTO-MCNC: NEGATIVE MG/DL
LACTATE SERPL-SCNC: 1.4 MMOL/L (ref 0.4–2)
LACTATE SERPL-SCNC: 5.2 MMOL/L (ref 0.4–2)
LEUKOCYTE ESTERASE UR QL STRIP.AUTO: ABNORMAL
LEVETIRACETAM SERPL-MCNC: 21 UG/ML (ref 10–40)
LIPASE SERPL-CCNC: 41 U/L (ref 9–82)
LYMPHOCYTES # BLD AUTO: 1.82 X10*3/UL (ref 0.8–3)
LYMPHOCYTES # BLD AUTO: 1.85 X10*3/UL (ref 0.8–3)
LYMPHOCYTES NFR BLD AUTO: 15.7 %
LYMPHOCYTES NFR BLD AUTO: 19.6 %
MAGNESIUM SERPL-MCNC: 1.97 MG/DL (ref 1.6–2.4)
MAGNESIUM SERPL-MCNC: 2.02 MG/DL (ref 1.6–2.4)
MCH RBC QN AUTO: 31.6 PG (ref 26–34)
MCH RBC QN AUTO: 32.7 PG (ref 26–34)
MCHC RBC AUTO-ENTMCNC: 32.9 G/DL (ref 32–36)
MCHC RBC AUTO-ENTMCNC: 33.9 G/DL (ref 32–36)
MCV RBC AUTO: 93 FL (ref 80–100)
MCV RBC AUTO: 99 FL (ref 80–100)
METHADONE UR QL SCN: ABNORMAL
MONOCYTES # BLD AUTO: 0.4 X10*3/UL (ref 0.05–0.8)
MONOCYTES # BLD AUTO: 1.08 X10*3/UL (ref 0.05–0.8)
MONOCYTES NFR BLD AUTO: 4.3 %
MONOCYTES NFR BLD AUTO: 9.1 %
MRSA DNA SPEC QL NAA+PROBE: NOT DETECTED
MUCOUS THREADS #/AREA URNS AUTO: ABNORMAL /LPF
NEUTROPHILS # BLD AUTO: 6.94 X10*3/UL (ref 1.6–5.5)
NEUTROPHILS # BLD AUTO: 8.8 X10*3/UL (ref 1.6–5.5)
NEUTROPHILS NFR BLD AUTO: 74.5 %
NEUTROPHILS NFR BLD AUTO: 74.8 %
NITRITE UR QL STRIP.AUTO: NEGATIVE
NRBC BLD-RTO: 0 /100 WBCS (ref 0–0)
NRBC BLD-RTO: 0 /100 WBCS (ref 0–0)
OPIATES UR QL SCN: ABNORMAL
OXYCODONE+OXYMORPHONE UR QL SCN: ABNORMAL
OXYHGB MFR BLDV: 19.2 % (ref 45–75)
OXYHGB MFR BLDV: 73.9 % (ref 45–75)
PCO2 BLDV: 53 MM HG (ref 41–51)
PCO2 BLDV: 60 MM HG (ref 41–51)
PCP UR QL SCN: ABNORMAL
PH BLDV: 7.16 PH (ref 7.33–7.43)
PH BLDV: 7.23 PH (ref 7.33–7.43)
PH UR STRIP.AUTO: 6 [PH]
PHOSPHATE SERPL-MCNC: 3.7 MG/DL (ref 2.5–4.9)
PLATELET # BLD AUTO: 165 X10*3/UL (ref 150–450)
PLATELET # BLD AUTO: 83 X10*3/UL (ref 150–450)
PO2 BLDV: 20 MM HG (ref 35–45)
PO2 BLDV: 46 MM HG (ref 35–45)
POTASSIUM SERPL-SCNC: 4.5 MMOL/L (ref 3.5–5.3)
POTASSIUM SERPL-SCNC: 5.2 MMOL/L (ref 3.5–5.3)
PROT SERPL-MCNC: 7.3 G/DL (ref 6.4–8.2)
PROT UR STRIP.AUTO-MCNC: ABNORMAL MG/DL
PROTHROMBIN TIME: 12.4 SECONDS (ref 9.8–12.8)
PROTHROMBIN TIME: 13.4 SECONDS (ref 9.8–12.8)
Q ONSET: 222 MS
Q ONSET: 223 MS
QRS COUNT: 12 BEATS
QRS COUNT: 23 BEATS
QRS DURATION: 70 MS
QRS DURATION: 90 MS
QT INTERVAL: 340 MS
QT INTERVAL: 390 MS
QTC CALCULATION(BAZETT): 429 MS
QTC CALCULATION(BAZETT): 510 MS
QTC FREDERICIA: 416 MS
QTC FREDERICIA: 445 MS
R AXIS: -63 DEGREES
R AXIS: -67 DEGREES
RBC # BLD AUTO: 4.71 X10*6/UL (ref 4.5–5.9)
RBC # BLD AUTO: 4.9 X10*6/UL (ref 4.5–5.9)
RBC # UR STRIP.AUTO: ABNORMAL /UL
RBC #/AREA URNS AUTO: >20 /HPF
SALICYLATES SERPL-MCNC: <3 MG/DL
SAO2 % BLDV: 20 % (ref 45–75)
SAO2 % BLDV: 76 % (ref 45–75)
SARS-COV-2 RNA RESP QL NAA+PROBE: NOT DETECTED
SODIUM SERPL-SCNC: 136 MMOL/L (ref 136–145)
SODIUM SERPL-SCNC: 139 MMOL/L (ref 136–145)
SP GR UR STRIP.AUTO: 1.01
SQUAMOUS #/AREA URNS AUTO: ABNORMAL /HPF
T AXIS: 60 DEGREES
T AXIS: 63 DEGREES
T OFFSET: 392 MS
T OFFSET: 418 MS
UROBILINOGEN UR STRIP.AUTO-MCNC: NORMAL MG/DL
VENTRICULAR RATE: 135 BPM
VENTRICULAR RATE: 73 BPM
WBC # BLD AUTO: 11.8 X10*3/UL (ref 4.4–11.3)
WBC # BLD AUTO: 9.3 X10*3/UL (ref 4.4–11.3)
WBC #/AREA URNS AUTO: >50 /HPF
WBC CLUMPS #/AREA URNS AUTO: ABNORMAL /HPF

## 2024-09-21 PROCEDURE — 93010 ELECTROCARDIOGRAM REPORT: CPT | Performed by: STUDENT IN AN ORGANIZED HEALTH CARE EDUCATION/TRAINING PROGRAM

## 2024-09-21 PROCEDURE — 93005 ELECTROCARDIOGRAM TRACING: CPT | Mod: 59

## 2024-09-21 PROCEDURE — 2500000005 HC RX 250 GENERAL PHARMACY W/O HCPCS: Performed by: PSYCHIATRY & NEUROLOGY

## 2024-09-21 PROCEDURE — 96375 TX/PRO/DX INJ NEW DRUG ADDON: CPT | Mod: 59

## 2024-09-21 PROCEDURE — 93005 ELECTROCARDIOGRAM TRACING: CPT

## 2024-09-21 PROCEDURE — 71045 X-RAY EXAM CHEST 1 VIEW: CPT | Performed by: RADIOLOGY

## 2024-09-21 PROCEDURE — 87086 URINE CULTURE/COLONY COUNT: CPT

## 2024-09-21 PROCEDURE — 80069 RENAL FUNCTION PANEL: CPT

## 2024-09-21 PROCEDURE — 96376 TX/PRO/DX INJ SAME DRUG ADON: CPT | Mod: 59

## 2024-09-21 PROCEDURE — 80307 DRUG TEST PRSMV CHEM ANLYZR: CPT | Performed by: PHYSICIAN ASSISTANT

## 2024-09-21 PROCEDURE — 36415 COLL VENOUS BLD VENIPUNCTURE: CPT | Performed by: PHYSICIAN ASSISTANT

## 2024-09-21 PROCEDURE — 85025 COMPLETE CBC W/AUTO DIFF WBC: CPT

## 2024-09-21 PROCEDURE — 87635 SARS-COV-2 COVID-19 AMP PRB: CPT | Performed by: PHYSICIAN ASSISTANT

## 2024-09-21 PROCEDURE — 80203 DRUG SCREEN QUANT ZONISAMIDE: CPT | Performed by: STUDENT IN AN ORGANIZED HEALTH CARE EDUCATION/TRAINING PROGRAM

## 2024-09-21 PROCEDURE — 87040 BLOOD CULTURE FOR BACTERIA: CPT | Mod: ELYLAB | Performed by: PHYSICIAN ASSISTANT

## 2024-09-21 PROCEDURE — 2500000001 HC RX 250 WO HCPCS SELF ADMINISTERED DRUGS (ALT 637 FOR MEDICARE OP)

## 2024-09-21 PROCEDURE — 82810 BLOOD GASES O2 SAT ONLY: CPT | Mod: CCI | Performed by: STUDENT IN AN ORGANIZED HEALTH CARE EDUCATION/TRAINING PROGRAM

## 2024-09-21 PROCEDURE — 84484 ASSAY OF TROPONIN QUANT: CPT | Performed by: PHYSICIAN ASSISTANT

## 2024-09-21 PROCEDURE — 2500000004 HC RX 250 GENERAL PHARMACY W/ HCPCS (ALT 636 FOR OP/ED): Performed by: PHYSICIAN ASSISTANT

## 2024-09-21 PROCEDURE — 5A1945Z RESPIRATORY VENTILATION, 24-96 CONSECUTIVE HOURS: ICD-10-PCS | Performed by: STUDENT IN AN ORGANIZED HEALTH CARE EDUCATION/TRAINING PROGRAM

## 2024-09-21 PROCEDURE — 74018 RADEX ABDOMEN 1 VIEW: CPT

## 2024-09-21 PROCEDURE — 2500000004 HC RX 250 GENERAL PHARMACY W/ HCPCS (ALT 636 FOR OP/ED)

## 2024-09-21 PROCEDURE — 87641 MR-STAPH DNA AMP PROBE: CPT

## 2024-09-21 PROCEDURE — 96367 TX/PROPH/DG ADDL SEQ IV INF: CPT | Mod: 59

## 2024-09-21 PROCEDURE — 95716 VEEG EA 12-26HR CONT MNTR: CPT

## 2024-09-21 PROCEDURE — 81001 URINALYSIS AUTO W/SCOPE: CPT

## 2024-09-21 PROCEDURE — 94003 VENT MGMT INPAT SUBQ DAY: CPT

## 2024-09-21 PROCEDURE — 83605 ASSAY OF LACTIC ACID: CPT | Performed by: PHYSICIAN ASSISTANT

## 2024-09-21 PROCEDURE — 83735 ASSAY OF MAGNESIUM: CPT

## 2024-09-21 PROCEDURE — 85610 PROTHROMBIN TIME: CPT

## 2024-09-21 PROCEDURE — 82947 ASSAY GLUCOSE BLOOD QUANT: CPT

## 2024-09-21 PROCEDURE — 96368 THER/DIAG CONCURRENT INF: CPT | Mod: 59

## 2024-09-21 PROCEDURE — 2020000001 HC ICU ROOM DAILY

## 2024-09-21 PROCEDURE — 71045 X-RAY EXAM CHEST 1 VIEW: CPT | Mod: 59

## 2024-09-21 PROCEDURE — 70450 CT HEAD/BRAIN W/O DYE: CPT

## 2024-09-21 PROCEDURE — 71045 X-RAY EXAM CHEST 1 VIEW: CPT

## 2024-09-21 PROCEDURE — 82330 ASSAY OF CALCIUM: CPT

## 2024-09-21 PROCEDURE — 70450 CT HEAD/BRAIN W/O DYE: CPT | Performed by: RADIOLOGY

## 2024-09-21 PROCEDURE — 95700 EEG CONT REC W/VID EEG TECH: CPT

## 2024-09-21 PROCEDURE — 2500000004 HC RX 250 GENERAL PHARMACY W/ HCPCS (ALT 636 FOR OP/ED): Performed by: STUDENT IN AN ORGANIZED HEALTH CARE EDUCATION/TRAINING PROGRAM

## 2024-09-21 PROCEDURE — 99291 CRITICAL CARE FIRST HOUR: CPT

## 2024-09-21 PROCEDURE — 51702 INSERT TEMP BLADDER CATH: CPT

## 2024-09-21 PROCEDURE — 36415 COLL VENOUS BLD VENIPUNCTURE: CPT | Performed by: STUDENT IN AN ORGANIZED HEALTH CARE EDUCATION/TRAINING PROGRAM

## 2024-09-21 PROCEDURE — 96365 THER/PROPH/DIAG IV INF INIT: CPT | Mod: 59

## 2024-09-21 PROCEDURE — 2500000005 HC RX 250 GENERAL PHARMACY W/O HCPCS: Performed by: STUDENT IN AN ORGANIZED HEALTH CARE EDUCATION/TRAINING PROGRAM

## 2024-09-21 PROCEDURE — 74018 RADEX ABDOMEN 1 VIEW: CPT | Performed by: RADIOLOGY

## 2024-09-21 PROCEDURE — 95720 EEG PHY/QHP EA INCR W/VEEG: CPT | Performed by: STUDENT IN AN ORGANIZED HEALTH CARE EDUCATION/TRAINING PROGRAM

## 2024-09-21 RX ORDER — LEVETIRACETAM 5 MG/ML
500 INJECTION INTRAVASCULAR EVERY 12 HOURS
Status: DISCONTINUED | OUTPATIENT
Start: 2024-09-21 | End: 2024-09-24

## 2024-09-21 RX ORDER — LORAZEPAM 2 MG/ML
2 INJECTION INTRAMUSCULAR EVERY 2 HOUR PRN
Status: DISCONTINUED | OUTPATIENT
Start: 2024-09-21 | End: 2024-09-25

## 2024-09-21 RX ORDER — LORAZEPAM 2 MG/ML
1 INJECTION INTRAMUSCULAR EVERY 2 HOUR PRN
Status: DISCONTINUED | OUTPATIENT
Start: 2024-09-21 | End: 2024-09-25

## 2024-09-21 RX ORDER — DEXTROSE 50 % IN WATER (D50W) INTRAVENOUS SYRINGE
12.5
Status: DISCONTINUED | OUTPATIENT
Start: 2024-09-21 | End: 2024-09-30 | Stop reason: HOSPADM

## 2024-09-21 RX ORDER — POTASSIUM CHLORIDE 20 MEQ/1
20 TABLET, EXTENDED RELEASE ORAL EVERY 6 HOURS PRN
Status: DISCONTINUED | OUTPATIENT
Start: 2024-09-21 | End: 2024-09-24

## 2024-09-21 RX ORDER — FENTANYL CITRATE 50 UG/ML
100 INJECTION, SOLUTION INTRAMUSCULAR; INTRAVENOUS ONCE
Status: DISCONTINUED | OUTPATIENT
Start: 2024-09-21 | End: 2024-09-21

## 2024-09-21 RX ORDER — POTASSIUM CHLORIDE 1.5 G/1.58G
40 POWDER, FOR SOLUTION ORAL EVERY 6 HOURS PRN
Status: DISCONTINUED | OUTPATIENT
Start: 2024-09-21 | End: 2024-09-24

## 2024-09-21 RX ORDER — CARVEDILOL 25 MG/1
25 TABLET ORAL 2 TIMES DAILY
Status: DISCONTINUED | OUTPATIENT
Start: 2024-09-21 | End: 2024-09-24

## 2024-09-21 RX ORDER — MULTIVIT-MIN/IRON FUM/FOLIC AC 7.5 MG-4
1 TABLET ORAL DAILY
Status: DISCONTINUED | OUTPATIENT
Start: 2024-09-21 | End: 2024-09-24

## 2024-09-21 RX ORDER — PROPOFOL 10 MG/ML
0-50 INJECTION, EMULSION INTRAVENOUS CONTINUOUS
Status: DISCONTINUED | OUTPATIENT
Start: 2024-09-21 | End: 2024-09-21 | Stop reason: HOSPADM

## 2024-09-21 RX ORDER — VANCOMYCIN HYDROCHLORIDE 1 G/200ML
1000 INJECTION, SOLUTION INTRAVENOUS EVERY 24 HOURS
Status: DISCONTINUED | OUTPATIENT
Start: 2024-09-22 | End: 2024-09-22

## 2024-09-21 RX ORDER — POTASSIUM CHLORIDE 20 MEQ/1
40 TABLET, EXTENDED RELEASE ORAL EVERY 6 HOURS PRN
Status: DISCONTINUED | OUTPATIENT
Start: 2024-09-21 | End: 2024-09-24

## 2024-09-21 RX ORDER — SODIUM CHLORIDE 9 MG/ML
75 INJECTION, SOLUTION INTRAVENOUS CONTINUOUS
Status: DISCONTINUED | OUTPATIENT
Start: 2024-09-21 | End: 2024-09-23

## 2024-09-21 RX ORDER — PROPOFOL 10 MG/ML
0-50 INJECTION, EMULSION INTRAVENOUS CONTINUOUS
Status: DISCONTINUED | OUTPATIENT
Start: 2024-09-21 | End: 2024-09-21

## 2024-09-21 RX ORDER — PROPOFOL 10 MG/ML
1 INJECTION, EMULSION INTRAVENOUS ONCE
Status: COMPLETED | OUTPATIENT
Start: 2024-09-21 | End: 2024-09-21

## 2024-09-21 RX ORDER — PROPOFOL 10 MG/ML
0-50 INJECTION, EMULSION INTRAVENOUS CONTINUOUS
Status: DISCONTINUED | OUTPATIENT
Start: 2024-09-21 | End: 2024-09-23

## 2024-09-21 RX ORDER — MAGNESIUM SULFATE HEPTAHYDRATE 40 MG/ML
2 INJECTION, SOLUTION INTRAVENOUS EVERY 6 HOURS PRN
Status: DISCONTINUED | OUTPATIENT
Start: 2024-09-21 | End: 2024-09-26

## 2024-09-21 RX ORDER — KETAMINE HYDROCHLORIDE 50 MG/ML
1 INJECTION, SOLUTION INTRAMUSCULAR; INTRAVENOUS ONCE
Status: COMPLETED | OUTPATIENT
Start: 2024-09-21 | End: 2024-09-20

## 2024-09-21 RX ORDER — FENTANYL CITRATE-0.9 % NACL/PF 10 MCG/ML
0-300 PLASTIC BAG, INJECTION (ML) INTRAVENOUS CONTINUOUS
Status: DISCONTINUED | OUTPATIENT
Start: 2024-09-21 | End: 2024-09-21 | Stop reason: HOSPADM

## 2024-09-21 RX ORDER — LANOLIN ALCOHOL/MO/W.PET/CERES
50 CREAM (GRAM) TOPICAL DAILY
Status: DISCONTINUED | OUTPATIENT
Start: 2024-09-21 | End: 2024-09-24

## 2024-09-21 RX ORDER — POLYETHYLENE GLYCOL 3350 17 G/17G
17 POWDER, FOR SOLUTION ORAL DAILY
Status: DISCONTINUED | OUTPATIENT
Start: 2024-09-21 | End: 2024-09-24

## 2024-09-21 RX ORDER — MAGNESIUM SULFATE HEPTAHYDRATE 40 MG/ML
4 INJECTION, SOLUTION INTRAVENOUS EVERY 6 HOURS PRN
Status: DISCONTINUED | OUTPATIENT
Start: 2024-09-21 | End: 2024-09-26

## 2024-09-21 RX ORDER — POTASSIUM CHLORIDE 14.9 MG/ML
20 INJECTION INTRAVENOUS EVERY 6 HOURS PRN
Status: DISCONTINUED | OUTPATIENT
Start: 2024-09-21 | End: 2024-09-26

## 2024-09-21 RX ORDER — ACETAMINOPHEN 325 MG/1
650 TABLET ORAL EVERY 4 HOURS PRN
Status: DISCONTINUED | OUTPATIENT
Start: 2024-09-21 | End: 2024-09-24

## 2024-09-21 RX ORDER — POTASSIUM CHLORIDE 1.5 G/1.58G
20 POWDER, FOR SOLUTION ORAL EVERY 6 HOURS PRN
Status: DISCONTINUED | OUTPATIENT
Start: 2024-09-21 | End: 2024-09-24

## 2024-09-21 RX ORDER — VANCOMYCIN HYDROCHLORIDE 1 G/20ML
INJECTION, POWDER, LYOPHILIZED, FOR SOLUTION INTRAVENOUS DAILY PRN
Status: DISCONTINUED | OUTPATIENT
Start: 2024-09-21 | End: 2024-09-22

## 2024-09-21 RX ORDER — LANOLIN ALCOHOL/MO/W.PET/CERES
100 CREAM (GRAM) TOPICAL DAILY
Status: DISCONTINUED | OUTPATIENT
Start: 2024-09-24 | End: 2024-09-24

## 2024-09-21 RX ORDER — ROCURONIUM BROMIDE 10 MG/ML
1 INJECTION, SOLUTION INTRAVENOUS ONCE
Status: COMPLETED | OUTPATIENT
Start: 2024-09-21 | End: 2024-09-20

## 2024-09-21 RX ORDER — FENTANYL CITRATE-0.9 % NACL/PF 10 MCG/ML
25-200 PLASTIC BAG, INJECTION (ML) INTRAVENOUS CONTINUOUS
Status: DISCONTINUED | OUTPATIENT
Start: 2024-09-21 | End: 2024-09-23

## 2024-09-21 RX ORDER — INSULIN LISPRO 100 [IU]/ML
0-5 INJECTION, SOLUTION INTRAVENOUS; SUBCUTANEOUS
Status: DISCONTINUED | OUTPATIENT
Start: 2024-09-21 | End: 2024-09-25

## 2024-09-21 RX ORDER — LORAZEPAM 2 MG/ML
0.5 INJECTION INTRAMUSCULAR EVERY 2 HOUR PRN
Status: DISCONTINUED | OUTPATIENT
Start: 2024-09-21 | End: 2024-09-25

## 2024-09-21 RX ORDER — FENTANYL CITRATE-0.9 % NACL/PF 10 MCG/ML
PLASTIC BAG, INJECTION (ML) INTRAVENOUS
Status: COMPLETED
Start: 2024-09-21 | End: 2024-09-21

## 2024-09-21 RX ORDER — DEXTROSE 50 % IN WATER (D50W) INTRAVENOUS SYRINGE
25
Status: DISCONTINUED | OUTPATIENT
Start: 2024-09-21 | End: 2024-09-30 | Stop reason: HOSPADM

## 2024-09-21 RX ORDER — FOLIC ACID 1 MG/1
1 TABLET ORAL DAILY
Status: DISCONTINUED | OUTPATIENT
Start: 2024-09-21 | End: 2024-09-24

## 2024-09-21 RX ADMIN — PROPOFOL 50 MCG/KG/MIN: 10 INJECTION, EMULSION INTRAVENOUS at 10:05

## 2024-09-21 RX ADMIN — Medication 1 TABLET: at 11:29

## 2024-09-21 RX ADMIN — PIPERACILLIN SODIUM AND TAZOBACTAM SODIUM 3.38 G: 3; .375 INJECTION, SOLUTION INTRAVENOUS at 09:56

## 2024-09-21 RX ADMIN — PROPOFOL 5 MCG/KG/MIN: 10 INJECTION, EMULSION INTRAVENOUS at 00:27

## 2024-09-21 RX ADMIN — LEVETIRACETAM 1000 MG: 10 INJECTION INTRAVENOUS at 00:23

## 2024-09-21 RX ADMIN — APIXABAN 5 MG: 5 TABLET, FILM COATED ORAL at 20:01

## 2024-09-21 RX ADMIN — Medication 1000 MCG: at 09:54

## 2024-09-21 RX ADMIN — PROPOFOL 40 MCG/KG/MIN: 10 INJECTION, EMULSION INTRAVENOUS at 21:00

## 2024-09-21 RX ADMIN — SODIUM CHLORIDE, POTASSIUM CHLORIDE, SODIUM LACTATE AND CALCIUM CHLORIDE 1000 ML: 600; 310; 30; 20 INJECTION, SOLUTION INTRAVENOUS at 00:15

## 2024-09-21 RX ADMIN — LEVETIRACETAM 500 MG: 5 INJECTION INTRAVENOUS at 21:48

## 2024-09-21 RX ADMIN — ONDANSETRON 4 MG: 2 INJECTION INTRAMUSCULAR; INTRAVENOUS at 00:24

## 2024-09-21 RX ADMIN — LEVETIRACETAM 1000 MG: 10 INJECTION INTRAVENOUS at 01:17

## 2024-09-21 RX ADMIN — PROPOFOL 50 MCG/KG/MIN: 10 INJECTION, EMULSION INTRAVENOUS at 06:02

## 2024-09-21 RX ADMIN — ZONISAMIDE 400 MG: 100 SUSPENSION ORAL at 16:50

## 2024-09-21 RX ADMIN — LEVETIRACETAM 500 MG: 5 INJECTION INTRAVENOUS at 14:06

## 2024-09-21 RX ADMIN — THIAMINE HYDROCHLORIDE 500 MG: 100 INJECTION, SOLUTION INTRAMUSCULAR; INTRAVENOUS at 13:40

## 2024-09-21 RX ADMIN — PROPOFOL 40 MCG/KG/MIN: 10 INJECTION, EMULSION INTRAVENOUS at 16:55

## 2024-09-21 RX ADMIN — FENTANYL CITRATE 1000 MCG: 0.05 INJECTION, SOLUTION INTRAMUSCULAR; INTRAVENOUS at 09:54

## 2024-09-21 RX ADMIN — APIXABAN 5 MG: 5 TABLET, FILM COATED ORAL at 11:29

## 2024-09-21 RX ADMIN — PIPERACILLIN SODIUM AND TAZOBACTAM SODIUM 3.38 G: 3; .375 INJECTION, SOLUTION INTRAVENOUS at 21:07

## 2024-09-21 RX ADMIN — SODIUM CHLORIDE 75 ML/HR: 9 INJECTION, SOLUTION INTRAVENOUS at 10:15

## 2024-09-21 RX ADMIN — Medication 40 PERCENT: at 10:00

## 2024-09-21 RX ADMIN — VANCOMYCIN HYDROCHLORIDE 1000 MG: 1 INJECTION, SOLUTION INTRAVENOUS at 01:03

## 2024-09-21 RX ADMIN — FOLIC ACID 1 MG: 1 TABLET ORAL at 11:30

## 2024-09-21 RX ADMIN — POLYETHYLENE GLYCOL 3350 17 G: 17 POWDER, FOR SOLUTION ORAL at 11:29

## 2024-09-21 RX ADMIN — PIPERACILLIN SODIUM AND TAZOBACTAM SODIUM 3.38 G: 3; .375 INJECTION, SOLUTION INTRAVENOUS at 16:49

## 2024-09-21 RX ADMIN — PYRIDOXINE HCL TAB 50 MG 50 MG: 50 TAB at 13:40

## 2024-09-21 RX ADMIN — PIPERACILLIN AND TAZOBACTAM 4.5 G: 4; .5 INJECTION, POWDER, FOR SOLUTION INTRAVENOUS at 00:20

## 2024-09-21 RX ADMIN — THIAMINE HYDROCHLORIDE 500 MG: 100 INJECTION, SOLUTION INTRAMUSCULAR; INTRAVENOUS at 22:12

## 2024-09-21 RX ADMIN — FENTANYL CITRATE 25 MCG/HR: 0.05 INJECTION, SOLUTION INTRAMUSCULAR; INTRAVENOUS at 00:33

## 2024-09-21 RX ADMIN — Medication 75 MCG/HR: at 20:01

## 2024-09-21 RX ADMIN — PROPOFOL 59 MG: 10 INJECTION, EMULSION INTRAVENOUS at 01:38

## 2024-09-21 SDOH — SOCIAL STABILITY: SOCIAL INSECURITY: WERE YOU ABLE TO COMPLETE ALL THE BEHAVIORAL HEALTH SCREENINGS?: NO

## 2024-09-21 SDOH — SOCIAL STABILITY: SOCIAL INSECURITY: ARE YOU OR HAVE YOU BEEN THREATENED OR ABUSED PHYSICALLY, EMOTIONALLY, OR SEXUALLY BY ANYONE?: UNABLE TO ASSESS

## 2024-09-21 SDOH — SOCIAL STABILITY: SOCIAL INSECURITY: HAVE YOU HAD ANY THOUGHTS OF HARMING ANYONE ELSE?: UNABLE TO ASSESS

## 2024-09-21 SDOH — SOCIAL STABILITY: SOCIAL INSECURITY: ARE THERE ANY APPARENT SIGNS OF INJURIES/BEHAVIORS THAT COULD BE RELATED TO ABUSE/NEGLECT?: UNABLE TO ASSESS

## 2024-09-21 SDOH — SOCIAL STABILITY: SOCIAL INSECURITY: DOES ANYONE TRY TO KEEP YOU FROM HAVING/CONTACTING OTHER FRIENDS OR DOING THINGS OUTSIDE YOUR HOME?: UNABLE TO ASSESS

## 2024-09-21 SDOH — SOCIAL STABILITY: SOCIAL INSECURITY: DO YOU FEEL ANYONE HAS EXPLOITED OR TAKEN ADVANTAGE OF YOU FINANCIALLY OR OF YOUR PERSONAL PROPERTY?: UNABLE TO ASSESS

## 2024-09-21 SDOH — SOCIAL STABILITY: SOCIAL INSECURITY: HAVE YOU HAD THOUGHTS OF HARMING ANYONE ELSE?: UNABLE TO ASSESS

## 2024-09-21 SDOH — SOCIAL STABILITY: SOCIAL INSECURITY: HAS ANYONE EVER THREATENED TO HURT YOUR FAMILY OR YOUR PETS?: UNABLE TO ASSESS

## 2024-09-21 SDOH — SOCIAL STABILITY: SOCIAL INSECURITY: DO YOU FEEL UNSAFE GOING BACK TO THE PLACE WHERE YOU ARE LIVING?: UNABLE TO ASSESS

## 2024-09-21 ASSESSMENT — ACTIVITIES OF DAILY LIVING (ADL)
PATIENT'S MEMORY ADEQUATE TO SAFELY COMPLETE DAILY ACTIVITIES?: UNABLE TO ASSESS
LACK_OF_TRANSPORTATION: PATIENT UNABLE TO ANSWER
DRESSING YOURSELF: UNABLE TO ASSESS
LACK_OF_TRANSPORTATION: PATIENT UNABLE TO ANSWER
FEEDING YOURSELF: UNABLE TO ASSESS
HEARING - LEFT EAR: UNABLE TO ASSESS
HEARING - RIGHT EAR: UNABLE TO ASSESS
BATHING: UNABLE TO ASSESS
ADEQUATE_TO_COMPLETE_ADL: UNABLE TO ASSESS
TOILETING: UNABLE TO ASSESS
WALKS IN HOME: UNABLE TO ASSESS
GROOMING: UNABLE TO ASSESS
JUDGMENT_ADEQUATE_SAFELY_COMPLETE_DAILY_ACTIVITIES: UNABLE TO ASSESS

## 2024-09-21 ASSESSMENT — RESPIRATORY DISTRESS OBSERVATION SCALE (RDOS)
HEART RATE PER MINUTE: 2 - >109 BEATS
RDOS TOTAL SCORE: 6
RESPIRATORY RATE PER MINUTE: 1 - 19-30 BREATHS
ACCESSORY MUSCLE RISE IN CLAVICLE DURING INSPIRATION: 2 - PRONOUNCED RISE
PARADOXICAL BREATHING PATTERN: 0 - NONE
GRUNTING AT END OF EXPIRATION: 0 - NONE
RESTLESS NONPURPOSEFUL MOVEMENTS: 1 - OCCASIONAL, SLIGHT MOVEMENTS
INVOLUNTARY NASAL FLARING: 0 - NONE
LOOK OF FEAR: 0 - NONE

## 2024-09-21 ASSESSMENT — LIFESTYLE VARIABLES
PAROXYSMAL SWEATS: NO SWEAT VISIBLE
AUDIT-C TOTAL SCORE: -1
ANXIETY: NO ANXIETY, AT EASE
AGITATION: NORMAL ACTIVITY
AGITATION: NORMAL ACTIVITY
TREMOR: NO TREMOR
AUDITORY DISTURBANCES: NOT PRESENT
HEADACHE, FULLNESS IN HEAD: NOT PRESENT
AGITATION: NORMAL ACTIVITY
NAUSEA AND VOMITING: NO NAUSEA AND NO VOMITING
HEADACHE, FULLNESS IN HEAD: NOT PRESENT
HOW OFTEN DO YOU HAVE A DRINK CONTAINING ALCOHOL: PATIENT UNABLE TO ANSWER
NAUSEA AND VOMITING: NO NAUSEA AND NO VOMITING
AUDITORY DISTURBANCES: NOT PRESENT
TREMOR: NO TREMOR
VISUAL DISTURBANCES: NOT PRESENT
ORIENTATION AND CLOUDING OF SENSORIUM: ORIENTED AND CAN DO SERIAL ADDITIONS
ANXIETY: NO ANXIETY, AT EASE
HOW MANY STANDARD DRINKS CONTAINING ALCOHOL DO YOU HAVE ON A TYPICAL DAY: PATIENT UNABLE TO ANSWER
VISUAL DISTURBANCES: NOT PRESENT
ORIENTATION AND CLOUDING OF SENSORIUM: ORIENTED AND CAN DO SERIAL ADDITIONS
VISUAL DISTURBANCES: NOT PRESENT
TREMOR: NO TREMOR
PAROXYSMAL SWEATS: NO SWEAT VISIBLE
PAROXYSMAL SWEATS: NO SWEAT VISIBLE
TOTAL SCORE: 0
SKIP TO QUESTIONS 9-10: 0
NAUSEA AND VOMITING: NO NAUSEA AND NO VOMITING
ANXIETY: NO ANXIETY, AT EASE
AUDIT-C TOTAL SCORE: -1
HOW OFTEN DO YOU HAVE 6 OR MORE DRINKS ON ONE OCCASION: PATIENT UNABLE TO ANSWER
AUDITORY DISTURBANCES: NOT PRESENT
HEADACHE, FULLNESS IN HEAD: NOT PRESENT
TOTAL SCORE: 0

## 2024-09-21 ASSESSMENT — PATIENT HEALTH QUESTIONNAIRE - PHQ9
SUM OF ALL RESPONSES TO PHQ9 QUESTIONS 1 & 2: 0
2. FEELING DOWN, DEPRESSED OR HOPELESS: NOT AT ALL
1. LITTLE INTEREST OR PLEASURE IN DOING THINGS: NOT AT ALL

## 2024-09-21 ASSESSMENT — PAIN - FUNCTIONAL ASSESSMENT
PAIN_FUNCTIONAL_ASSESSMENT: UNABLE TO SELF-REPORT
PAIN_FUNCTIONAL_ASSESSMENT: CPOT (CRITICAL CARE PAIN OBSERVATION TOOL)
PAIN_FUNCTIONAL_ASSESSMENT: UNABLE TO SELF-REPORT
PAIN_FUNCTIONAL_ASSESSMENT: UNABLE TO SELF-REPORT
PAIN_FUNCTIONAL_ASSESSMENT: CPOT (CRITICAL CARE PAIN OBSERVATION TOOL)

## 2024-09-21 ASSESSMENT — COLUMBIA-SUICIDE SEVERITY RATING SCALE - C-SSRS
6. HAVE YOU EVER DONE ANYTHING, STARTED TO DO ANYTHING, OR PREPARED TO DO ANYTHING TO END YOUR LIFE?: NO
2. HAVE YOU ACTUALLY HAD ANY THOUGHTS OF KILLING YOURSELF?: NO
1. IN THE PAST MONTH, HAVE YOU WISHED YOU WERE DEAD OR WISHED YOU COULD GO TO SLEEP AND NOT WAKE UP?: NO

## 2024-09-21 ASSESSMENT — COGNITIVE AND FUNCTIONAL STATUS - GENERAL: PATIENT BASELINE BEDBOUND: UNABLE TO ASSESS AT THIS TIME

## 2024-09-21 NOTE — ED PROCEDURE NOTE
Procedure  Intubation    Performed by: Kashmir Bright DO  Authorized by: Kashmir Bright DO    Consent:     Consent obtained:  Emergent situation  Universal protocol:     Patient identity confirmed:  Anonymous protocol, patient vented/unresponsive  Pre-procedure details:     Indications: airway protection, altered consciousness and respiratory failure      Patient status:  Unresponsive    Induction agents:  Ketamine    Paralytics:  Rocuronium  Procedure details:     Preoxygenation:  Bag valve mask    CPR in progress: no      Number of attempts:  1  Successful intubation attempt details:     Intubation method:  Oral    Intubation technique: video assisted      Laryngoscope blade:  Hypercurved    Grade view: I      Tube size (mm):  7.0    Tube type:  Cuffed    Tube visualized through cords: yes    Placement assessment:     ETT at teeth/gumline (cm):  23    Tube secured with:  ETT waldron    Breath sounds:  Equal and absent over the epigastrium    Placement verification: chest rise, colorimetric ETCO2, CXR verification, direct visualization and equal breath sounds      CXR findings:  High    Tube repositioned: yes    Post-procedure details:     Procedure completion:  Tolerated               Kashmir Bright DO  09/21/24 0006

## 2024-09-21 NOTE — ED PROVIDER NOTES
HPI   Chief Complaint   Patient presents with   • Seizures     Pt arrived by squad after a seizure at home. Squad witnessed 2 more in route. Hx of seizure last one 2 yrs ago. Takes Keppra at home and is compliant       This is a 73-year-old male with a past medical history of atrial fibrillation and anticoagulation sent in from home by EMS with report of witnessed seizure.  According to EMS the patient had a seizure at home.  EMS was called on scene the patient had 2 more seizures.  He was given 5 mg of Versed IV push and route.  He remains postictal upon arrival.  There is no one else here to give any history.  Patient's history was obtained to the patient's EMR.  He is on Keppra and according to report he is compliant.  His past medical history also includes hypertension, mitral regurgitation, nonischemic cardiomyopathy.  EMR reports that he sees Dr. Nash from neurology for seizures at the [facility].  His last office visit was November 27, 2023.      History provided by:  Medical records and EMS personnel  History limited by:  Intubated, acuity of condition, patient nonverbal and patient unresponsive          Patient History   Past Medical History:   Diagnosis Date   • Encounter for follow-up examination after completed treatment for conditions other than malignant neoplasm     Hospital discharge follow-up   • Encounter for follow-up examination after completed treatment for conditions other than malignant neoplasm     Follow-up exam after completing high-risk medication   • Encounter for screening for diabetes mellitus     Diabetes mellitus screening   • Impingement syndrome of unspecified shoulder     Shoulder impingement syndrome   • Pain in left finger(s) 05/20/2021    Pain of left middle finger   • Personal history of other diseases of the nervous system and sense organs 05/20/2021    History of impacted cerumen   • Personal history of other diseases of the nervous system and sense  organs     H/O impacted cerumen   • Personal history of other diseases of the respiratory system 05/20/2021    History of acute bronchitis   • Personal history of other diseases of the respiratory system     History of acute bronchitis   • Personal history of other specified conditions 06/01/2021    History of seizure     Past Surgical History:   Procedure Laterality Date   • MR HEAD ANGIO WO IV CONTRAST  4/6/2021    MR HEAD ANGIO WO IV CONTRAST 4/6/2021 [facility] CLINICAL LEGACY   • MR NECK ANGIO WO IV CONTRAST  4/6/2021    MR NECK ANGIO WO IV CONTRAST 4/6/2021 [facility] CLINICAL LEGACY   • OTHER SURGICAL HISTORY  05/20/2021    Cardioversion   • OTHER SURGICAL HISTORY  05/20/2021    Knee surgery   • OTHER SURGICAL HISTORY  05/20/2021    Vasectomy   • OTHER SURGICAL HISTORY  05/20/2021    Cardiac catheterization   • OTHER SURGICAL HISTORY  10/02/2021    Leg surgery     No family history on file.  Social History     Tobacco Use   • Smoking status: Never     Passive exposure: Never   • Smokeless tobacco: Never   Substance Use Topics   • Alcohol use: Not Currently   • Drug use: Yes     Types: Marijuana       Physical Exam   ED Triage Vitals [09/20/24 2330]   Temp Pulse Resp BP   -- -- -- --      Pulse Ox Temp src Heart Rate Source Patient Position   (!) 90 % -- -- --      BP Location FiO2 (%)     -- --       Physical Exam  Vitals and nursing note reviewed.   Constitutional:       General: He is sleeping. He is not in acute distress.     Comments: Patient is unresponsive appears to be postictal   HENT:      Head: Normocephalic and atraumatic. No raccoon eyes, Chang's sign, right periorbital erythema or left periorbital erythema.      Right Ear: Ear canal and external ear normal.      Left Ear: Ear canal and external ear normal.      Nose: Congestion present. No rhinorrhea.      Mouth/Throat:      Mouth: Mucous membranes are dry.      Pharynx: Oropharynx is clear.   Eyes:      General: Lids are normal.      Comments: Pupils are  pinpoint nonreactive   Neck:      Vascular: No carotid bruit.      Comments: Trachea is midline no JVD no carotid bruits  Cardiovascular:      Rate and Rhythm: Regular rhythm. Tachycardia present.   Pulmonary:      Breath sounds: Examination of the right-upper field reveals wheezing and rhonchi. Examination of the left-upper field reveals wheezing and rhonchi. Examination of the right-middle field reveals wheezing and rhonchi. Examination of the left-middle field reveals wheezing and rhonchi. Examination of the right-lower field reveals wheezing and rhonchi. Examination of the left-lower field reveals wheezing and rhonchi. Wheezing and rhonchi present.   Abdominal:      General: Bowel sounds are normal. There is no distension.      Palpations: Abdomen is soft. There is no mass.      Hernia: No hernia is present.   Musculoskeletal:         General: No swelling.      Cervical back: Neck supple.   Skin:     General: Skin is warm and dry.      Capillary Refill: Capillary refill takes less than 2 seconds.   Neurological:      Comments: Patient is unresponsive and postictal   Psychiatric:      Comments: Patient is unresponsive and postictal           ED Course & MDM   Diagnoses as of 09/21/24 0200   Status epilepticus (Multi)   Respiratory failure, unspecified chronicity, unspecified whether with hypoxia or hypercapnia (Multi)                 No data recorded     Devon Coma Scale Score: 3 (09/20/24 2330 : Mami Wynn RN)                           Medical Decision Making  Patient became hypoxic he was therefore intubated by the ED attending.  See his procedure note for details.  Patient had IV access, he was given a liter of LR, he was given Zosyn 4.5 g IV piggyback, 1 g of vancomycin IV piggyback, he was also given a gram of Keppra IV piggyback.  I have ordered CT scan of the head, portable chest x-ray, lab work.  CBC white count is 9.3 hemoglobin 16 hematocrit 40.6 platelet count was 165, chemistry panel glucose 195  otherwise within normal limits normal LFTs, lipase was 41, lactic is 5.2, troponin was 11, magnesium 2.02, PT 13.4, INR 1.2, acute blood toxicology panel was negative, blood gas pH 7.16 pCO2 60 pO2 of 20 this was a venous blood gas,  CT scan of the head shows no acute intracranial abnormality.  X-ray of the chest shows no evidence of any acute infiltrate.  Evidence of the ETT tip 5.7 cm above the tigre.  NG tube extends into the stomach.  No acute cardiopulmonary process.  The patient remains on the ventilator.  Due to the patient's continued status epilepticus state patient was transferred down to neuro ICU for continuous EEG monitoring.        Procedure  Critical Care    Performed by: Yovanny Tobin PA-C  Authorized by: Kashmir Bright DO    Critical care provider statement:     Critical care time (minutes):  80    Critical care time was exclusive of:  Separately billable procedures and treating other patients    Critical care was necessary to treat or prevent imminent or life-threatening deterioration of the following conditions:  Respiratory failure (Respiratory distress requiring intubation, status post seizures)    Critical care was time spent personally by me on the following activities:  Blood draw for specimens, discussions with consultants, evaluation of patient's response to treatment, examination of patient, obtaining history from patient or surrogate, ordering and review of laboratory studies, ordering and review of radiographic studies, pulse oximetry, re-evaluation of patient's condition, review of old charts and ventilator management    Care discussed with: admitting provider         Yovanny Toibn PA-C  09/21/24 2205

## 2024-09-21 NOTE — PROGRESS NOTES
[facility]  NEUROSCIENCE INTENSIVE CARE UNIT  DAILY PROGRESS NOTE       Patient Name: Paddy Solis   MRN: [id number]     Admit Date: 2024     : 1950 AGE: 73 y.o. GENDER: male        Subjective    73 y.o. 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  Admitted 2024 with Status epilepticus (Multi) after presenting  to [facility] as a transfer from [place] with c/f status epilepticus. Admitted to NSU for close monitoring with EEG and optimize AED regimen as needed. Patient arrived intubated and sedated on propofol. Loaded with 2g of Keppra PTA. CTH obtained was unremarkable.    Significant Events:  - Admitted to NSU  - On arrival there was concern patient was having an active seizure with generlized shaking with eyes rolled back & eyes deviated to the right. Resolved on arrival to evaluate. Event lasted >1 minute. Was bolused with Prop and rate increased to 50mcg/min       Objective   VITALS (24H):  Temp:  [36 °C (96.8 °F)-37 °C (98.6 °F)] 36 °C (96.8 °F)  Heart Rate:  [] 100  Resp:  [10-31] 16  BP: ()/(50-81) 112/64  FiO2 (%):  [40 %-60 %] 40 %  INTAKE/OUTPUT:No intake or output data in the 24 hours ending 24 1021  VENT SETTINGS:  Vent Mode: Volume control/assist control  FiO2 (%):  [40 %-60 %] 40 %  S RR:  [16-20] 16  S VT:  [400 mL-450 mL] 450 mL  PEEP/CPAP (cm H2O):  [5 cm H20] 5 cm H20  PIP Set (cm H2O):  [22 cm H2O] 22 cm H2O  MAP (cm H2O):  [8-13] 10     PHYSICAL EXAM:  NEURO:  - Intubated/Sedated on 50mcg Prop  - ECNS, PERRL, midline gaze, No withdrawal to nox stim.  - moving all extremities spontaneously prior to propofol bolus  CV:  - RRR on telemetry, NSR  RESP:  - Intubated  - PC/AC. FiO2 40%  :  - indwelling catheter in place  GI:  - Abdomen NT/ND, soft  SKIN:  - Intact    MEDICATIONS:  Scheduled: PRN: Continuous:   apixaban, 5 mg, oral, BID  [Held by provider] carvedilol, 25 mg,  oral, BID  insulin lispro, 0-5 Units, subcutaneous, TID  levETIRAcetam, 500 mg, intravenous, q12h  oxygen, , inhalation, Continuous - Inhalation  piperacillin-tazobactam, 3.375 g, intravenous, q6h  polyethylene glycol, 17 g, oral, Daily  pyridoxine, 50 mg, oral, Daily  [START ON 9/22/2024] vancomycin, 1,000 mg, intravenous, q24h  zonisamide, 300 mg, oral, Nightly     PRN medications: acetaminophen, dextrose, dextrose, glucagon, glucagon, magnesium sulfate, magnesium sulfate, potassium chloride CR **OR** potassium chloride, potassium chloride CR **OR** potassium chloride, potassium chloride, vancomycin propofol, 0-50 mcg/kg/min, Last Rate: 50 mcg/kg/min (09/21/24 1005)  sodium chloride 0.9%, 75 mL/hr, Last Rate: 75 mL/hr (09/21/24 1015)         LAB RESULTS:  Results for orders placed or performed during the hospital encounter of 09/20/24 (from the past 24 hour(s))   CBC and Auto Differential   Result Value Ref Range    WBC 9.3 4.4 - 11.3 x10*3/uL    nRBC 0.0 0.0 - 0.0 /100 WBCs    RBC 4.90 4.50 - 5.90 x10*6/uL    Hemoglobin 16.0 13.5 - 17.5 g/dL    Hematocrit 48.6 41.0 - 52.0 %    MCV 99 80 - 100 fL    MCH 32.7 26.0 - 34.0 pg    MCHC 32.9 32.0 - 36.0 g/dL    RDW 13.5 11.5 - 14.5 %    Platelets 165 150 - 450 x10*3/uL    Neutrophils % 74.8 40.0 - 80.0 %    Immature Granulocytes %, Automated 0.4 0.0 - 0.9 %    Lymphocytes % 19.6 13.0 - 44.0 %    Monocytes % 4.3 2.0 - 10.0 %    Eosinophils % 0.3 0.0 - 6.0 %    Basophils % 0.6 0.0 - 2.0 %    Neutrophils Absolute 6.94 (H) 1.60 - 5.50 x10*3/uL    Immature Granulocytes Absolute, Automated 0.04 0.00 - 0.50 x10*3/uL    Lymphocytes Absolute 1.82 0.80 - 3.00 x10*3/uL    Monocytes Absolute 0.40 0.05 - 0.80 x10*3/uL    Eosinophils Absolute 0.03 0.00 - 0.40 x10*3/uL    Basophils Absolute 0.06 0.00 - 0.10 x10*3/uL   Magnesium   Result Value Ref Range    Magnesium 2.02 1.60 - 2.40 mg/dL   Comprehensive metabolic panel   Result Value Ref Range    Glucose 195 (H) 74 - 99 mg/dL     Sodium 139 136 - 145 mmol/L    Potassium 4.5 3.5 - 5.3 mmol/L    Chloride 106 98 - 107 mmol/L    Bicarbonate 22 21 - 32 mmol/L    Anion Gap 16 10 - 20 mmol/L    Urea Nitrogen 18 6 - 23 mg/dL    Creatinine 1.19 0.50 - 1.30 mg/dL    eGFR 65 >60 mL/min/1.73m*2    Calcium 9.5 8.6 - 10.3 mg/dL    Albumin 4.3 3.4 - 5.0 g/dL    Alkaline Phosphatase 97 33 - 136 U/L    Total Protein 7.3 6.4 - 8.2 g/dL    AST 18 9 - 39 U/L    Bilirubin, Total 0.6 0.0 - 1.2 mg/dL    ALT 12 10 - 52 U/L   Lactate   Result Value Ref Range    Lactate 5.2 (HH) 0.4 - 2.0 mmol/L   Protime-INR   Result Value Ref Range    Protime 13.4 (H) 9.8 - 12.8 seconds    INR 1.2 (H) 0.9 - 1.1   Acute Toxicology Panel, Blood   Result Value Ref Range    Acetaminophen <10.0 10.0 - 30.0 ug/mL    Salicylate  <3 4 - 20 mg/dL    Alcohol <10 <=10 mg/dL   Lipase   Result Value Ref Range    Lipase 41 9 - 82 U/L   Troponin I, High Sensitivity, Initial   Result Value Ref Range    Troponin I, High Sensitivity 11 0 - 20 ng/L   Sars-CoV-2 PCR   Result Value Ref Range    Coronavirus 2019, PCR Not Detected Not Detected   BLOOD GAS VENOUS   Result Value Ref Range    POCT pH, Venous 7.16 (LL) 7.33 - 7.43 pH    POCT pCO2, Venous 60 (H) 41 - 51 mm Hg    POCT pO2, Venous 20 (L) 35 - 45 mm Hg    POCT SO2, Venous 20 (L) 45 - 75 %    POCT Oxy Hemoglobin, Venous 19.2 (L) 45.0 - 75.0 %    POCT Base Excess, Venous -8.0 (L) -2.0 - 3.0 mmol/L    POCT HCO3 Calculated, Venous 21.4 (L) 22.0 - 26.0 mmol/L    Patient Temperature      FiO2 40 %    Critical Called By NATALIE RRT     Critical Called To MONE MICHAUD     Critical Call Time 33     Critical Read Back Y    Troponin, High Sensitivity, 1 Hour   Result Value Ref Range    Troponin I, High Sensitivity 8 0 - 20 ng/L   Drug Screen, Urine   Result Value Ref Range    Amphetamine Screen, Urine Presumptive Negative Presumptive Negative    Barbiturate Screen, Urine Presumptive Negative Presumptive Negative    Benzodiazepines Screen, Urine Presumptive  Positive (A) Presumptive Negative    Cannabinoid Screen, Urine Presumptive Positive (A) Presumptive Negative    Cocaine Metabolite Screen, Urine Presumptive Negative Presumptive Negative    Fentanyl Screen, Urine Presumptive Positive (A) Presumptive Negative    Opiate Screen, Urine Presumptive Negative Presumptive Negative    Oxycodone Screen, Urine Presumptive Negative Presumptive Negative    PCP Screen, Urine Presumptive Negative Presumptive Negative    Methadone Screen, Urine Presumptive Negative Presumptive Negative   BLOOD GAS VENOUS   Result Value Ref Range    POCT pH, Venous 7.23 (LL) 7.33 - 7.43 pH    POCT pCO2, Venous 53 (H) 41 - 51 mm Hg    POCT pO2, Venous 46 (H) 35 - 45 mm Hg    POCT SO2, Venous 76 (H) 45 - 75 %    POCT Oxy Hemoglobin, Venous 73.9 45.0 - 75.0 %    POCT Base Excess, Venous -5.8 (L) -2.0 - 3.0 mmol/L    POCT HCO3 Calculated, Venous 22.2 22.0 - 26.0 mmol/L    Patient Temperature      FiO2 60 %    Critical Called By CN RRT     Critical Called To MONE DO     Critical Call Time 328     Critical Read Back Y    Lavender Top   Result Value Ref Range    Extra Tube Hold for add-ons.    SST TOP   Result Value Ref Range    Extra Tube Hold for add-ons.    PST Top   Result Value Ref Range    Extra Tube Hold for add-ons.    Lactate   Result Value Ref Range    Lactate 1.4 0.4 - 2.0 mmol/L         IMAGING RESULTS:  XR chest 1 view    (Results Pending)         Assessment/Plan    NEURO:  #Status epilepticus s/p 2 keppra load  - CTH: no acute intracranial abnormality  Assessment:  - See above  Plan:  - NSU  - Neuro Checks: Q1H  - Sedation: Propofol, continue until negative EEG  - Pain: acetaminophen PRN  - Follow drug levels, urine drug screen, continue evaluation for a trigger  - CIWA protocol (last drink 9/23)  - C/w Keppra 500mg BID, ZNS 300mg at bedtime   - PT/OT/SLP    CARDIOVASCULAR:  #Afib  #HFrEF  Plan:  - Continue to monitor on telemetry  - BP goal: Normotension  - Continue home eliquis  - Hold  home coreg given soft BP, resume if BP normalizes     RESPIRATORY:  #Hypoxic respiratory failure s/p intubation  CXR with no c/f pneumonia, fluid overload  Plan:  - Continuous pulse oximetry   - O2 PRN to maintain SpO2 > 94%, wean as tolerated  - After status epilepticus has been ruled out or sufficiently treated, can wean to extubate   - ETT 5.7cm from tigre will repeat CXR on arrival    RENAL/:  #MAYTE  Assessment:  - Baseline BUN/Cr: 18/1.2  Results from last 72 hours   Lab Units 24  2341   BUN mg/dL 18   CREATININE mg/dL 1.19       Plan:  - Monitor with daily RFP    FEN/GI:  #MAYTE  Assessment:  -  Last BM PTA  Plan:  - Monitor and replace electrolytes per protocol  - IVF: NS @ 75 mL/hr  - Diet: NPO   - Bowel Regimen: Miralax QD    ENDOCRINE:  #MAYTE  Assessment:  Results from last 7 days   Lab Units 24  2341   GLUCOSE mg/dL 195*      Plan:  - Accuchecks & ISS Q6H     HEMATOLOGY:  #MAYTE  Assessment:  - Baseline Hgb: 15  - Baseline Plts: 130  Results from last 7 days   Lab Units 24  2341   HEMOGLOBIN g/dL 16.0   HEMATOCRIT % 48.6   PLATELETS AUTO x10*3/uL 165     Plan:  - Continue to monitor with daily CBC and Coag panel    INFECTIOUS DISEASE:  #c/f sepsis  Assessment:  Results from last 7 days   Lab Units 24  2341   WBC AUTO x10*3/uL 9.3    - Temp (24hrs), Av.5 °C (97.7 °F), Min:36 °C (96.8 °F), Max:37 °C (98.6 °F)     Plan:  - Continue to monitor for s/sx of infection  - Pan culture for temperature > 38.4 C  - S/p vanc/zosyn in ED, continue for 48h and consider dc if culture negative  - Follow UA, blood cultures, MRSA    MUSCULOSKELETAL:  - No acute issues    SKIN:  - No acute issues  - Turns and skin care per NSU protocol    ACCESS:  - PIV    PROPHYLAXIS:  - DVT Ppx: Eliquis  - GI Ppx: Pantoprazole    RESTRAINTS:  I agree with nursing assessment of the patient's need for restraints to protect the patient from injury and facilitate healing. The patient is unable to cooperate with the  plan of care and at risk for disrupting critical therapy (i.e., removing medical devices, lines, tubes and/or dressings).  Please see order for specifics. Restraints can be removed when the patient is able to cooperate with plan of care and allow healing to occur, or the medical devices at risk are discontinued by the medical team.     Gary Huber, DO  Neuroscience Intensive Care       Plan of care to be discussed with neurocritical care attending

## 2024-09-21 NOTE — H&P
"EPILEPSY HISTORY AND PHYSICAL  NOTE    Paddy Solis is a 73 y.o. 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     History of Present Illness:   Per ED note at [place]:   \"Sent in from home by EMS with report of witnessed seizure. According to EMS the patient had a seizure at home. EMS was called on scene the patient had 2 more seizures. He was given 5 mg of Versed IV push and route. He remains postictal upon arrival. \"     On arrival to [place] ED:  /82, , SPO2 83%, RR 30  He was intubated and BP post intubation was 91/50, given hypotension, there was c/f sepsis so blood cultures were collected and pt was started on vanc/zosyn  WBC 9.3, RFP/LFTs wnl, troponin 11  Covid negative  Lactate 5.2 -> 1.4  Utox positive for cannabinoids, fentanyl, benzodiazepines (utox sent after Versed push and fent push for intubation)  Serum tox negative, alcohol level <10  Keppra, ZNS levels pending  CTH without acute intracranial abnormalities    ED intervention: 2g keppra load, intubated for airway protection, on prop/fent for sedation, started vanc/zosyn and sent blood cultures x2     Pt remained sedated and unresponsive post intubation without clinical seizure-like activity. Pt transferred to [facility] for EEG monitoring to rule out subclinical status epilepticus.    On arrival to [facility], pt remains intubated and sedated, on prop/fent. Per RN, pt noted to have subtle generalized twitching and R gaze deviation c/f seizures, so was bolused prop and increased to 50 mcg/kg/min with subsequent resolution.     History obtained from telephone call with pt's wife. She reports that pt woke up at ~0600 today and she noted he was sweating, then went back to sleep. He woke up later this morning then let out a cry, prompting his wife to go into his room and noted that he was " unresponsive, staring straight ahead with generalized shaking. She described the shaking as twitching rather than generalized convulsions and noted that this was similar to his usual seizures but more mild. Otherwise, she denied tongue biting, urinary/bowel incontinence. This lasted ~1 min and she noted that he was very drowsy and lethargic afterwards. This prompted her to call EMS and pt was taken to [place].     Per wife, pt's seizures have been very well controlled on current AED regimen with last seizure occurring >1 year ago (05/2023). He has been compliant with his meds and hasn't missed any doses however, she notes that he had split up his Keppra dose and was taking 500mg twice a day instead of 1000mg at bedtime. Pt usually manages his meds on his own so she is unsure how long he has been doing this but she estimates ~1 month and stated that he reportedly did not tolerate taking the 1000mg at bedtime. She also reports that he has a history of binge drinking and may have been drinking this past week as she has been out of town, with his last drink on 9/19. She also notes that he has an erratic sleep schedule, which she suspects may have contributed. Otherwise, she denied recent fever/chills, med changes, URI/UTI symptoms.     Relevant History per Chart Review:  Epilepsy history:   EPILEPTIC Paroxysmal Episodes   Semiology:   1. Automotor > R Version > GTC w/ pos ictal psychosis** (left temporal onset)  2. Automotor seizure (D) (right temporal onset)  - Onset: April of 2021, possibly 1 year earlier (episodes of confusion)  - Frequency: **None since 4/23   EZ: Bitemporal  Etiology: unknown   Comorbidities: HFrEF, NICM, afib on Eliquis   Prior AEDs: OXC (hyponatremia), Depakote (cognitive side effects)  Current AEDs:  mg at bedtime, keppra XR 1000mg at bedtime     - Patient reports that his first seizure dates back to April 2021, prior to which he has never had any seizures. He denies any clear inciting  events. 4/2021 seizure was characterized as UE/LE convulsions -> stiffening of all 4 ext lasting no more than 3min and occurred during sleep followed by 1h confusion/lethargy.      - Of note, he was admitted 5/2021 for AMS (wandering around the house, repetitive behaviors, and thinking he was going to work when he was not) and UE jerking episode     - Admitted to EMU on 2/2023(was on OXC). Bitemporal seizures were captured.  MRI brain([facility] protocol) showed hyperintense signal from the BL mesial temporal lobes L > R. Looking through T1 and T2 it appears in the L anterior temporal lobe lateral to the amygdala there is an area where there is blurring of the grey-white matter differentiation.     - 3/6/23 was seen by Mirna Giraldo, noted to have worsening hyponatremia and was switched to ZNS 300mg at bedtime.      - Admitted to hospital 5/2023 with status epilepticus, with multiple reported GTCs without return to baseline. Pt and wife reported that he was compliant on his ZNS. Depakote 500mg DR BID was added. However he reported cognitive side effects on follow up and depakote was switched to keppra XR 1g at bedtime despite episodes of irritability with past use of keppra. Last follow up in 11/2023      Levels:  - Keppra: 12 (11/2023)  - ZNS: 13 5/20/2023, 14 5/10/23, 5.9 4/21/23     EPILEPSY RISK FACTORS: No prior history of stroke, head trauma, neurosurgical procedures, CNS infections , complicated birth history, or childhood seizures     ROS: Unable to be completed.    Home Medications:    Current Outpatient Medications   Medication Instructions    apixaban (ELIQUIS) 2.5 mg, oral, 2 times daily    aspirin 81 mg, oral, Daily    carvedilol (COREG) 25 mg, oral, 2 times daily    Eliquis 5 mg, oral, 2 times daily    escitalopram (LEXAPRO) 5 mg, oral, Daily    levETIRAcetam XR (KEPPRA XR) 1,000 mg, oral, Daily    magnesium oxide (Mag-Ox) 400 mg (241.3 mg magnesium) tablet 1 tablet, oral, Daily    pyridoxine (VITAMIN  B-6) 50 mg, oral, Daily    zonisamide (ZONEGRAN) 300 mg, oral, Daily      Past Medical History:    has a past medical history of Encounter for follow-up examination after completed treatment for conditions other than malignant neoplasm, Encounter for follow-up examination after completed treatment for conditions other than malignant neoplasm, Encounter for screening for diabetes mellitus, Impingement syndrome of unspecified shoulder, Pain in left finger(s) (05/20/2021), Personal history of other diseases of the nervous system and sense organs (05/20/2021), Personal history of other diseases of the nervous system and sense organs, Personal history of other diseases of the respiratory system (05/20/2021), Personal history of other diseases of the respiratory system, and Personal history of other specified conditions (06/01/2021).    Past Surgical History:    has a past surgical history that includes Other surgical history (05/20/2021); Other surgical history (05/20/2021); Other surgical history (05/20/2021); Other surgical history (05/20/2021); Other surgical history (10/02/2021); MR angio head wo IV contrast (4/6/2021); and MR angio neck wo IV contrast (4/6/2021).    Allergies:   Allergies   Allergen Reactions    Naproxen Hives       Family History:   No family history on file.    Past Social History:   Social History     Tobacco Use    Smoking status: Never     Passive exposure: Never    Smokeless tobacco: Never   Substance Use Topics    Alcohol use: Not Currently    Drug use: Yes     Types: Marijuana       Vitals:   Temp:  [37 °C (98.6 °F)] 37 °C (98.6 °F)  Heart Rate:  [] 72  Resp:  [10-31] 17  BP: ()/(50-81) 111/69  FiO2 (%):  [40 %-60 %] 40 %    Physical Exam:   GENERAL APPEARANCE:  Intubated, sedated    CARDIOVASCULAR: Regular, rate and rhythm. Radial pulses 2+ bilaterally.     MENTAL STATE:   Pt intubted and sedated. Eyes closed to nox stim. Grimaces to nox stim but otherwise non-verbal, not  following commands.   Recent and remote memory were impaired.  Attention span and concentration were impaired. Language testing was unable to be performed. The patient could not correctly interpret a picture. General fund of knowledge unable to be tested.     OPHTHALMOSCOPIC:   Unable to assess due to limited pt cooperation/miotic pupils     CRANIAL NERVES:   CN 2   Difficult to assess due to limited pt cooperation, no clear blink to threat noted.  CN 3, 4, 6   Pupils round, 2 mm in diameter, unreactive bilaterally. Lids symmetric; no ptosis.   No nystagmus.   CN 5   Unable to assess due to pt's impaired mental status   CN 7   Unable to assess as pt is intubated  CN 8   Unable to assess due to pt's impaired mental status   CN 9   Unable to assess as pt is intubated  CN 11   Unable to assess as pt is intubated   CN 12   Unable to assess as pt is intubated    MOTOR:   Muscle bulk and tone were normal in both upper and lower extremities.   No fasciculations, tremor or other abnormal movements were present.     BUE flaccid to nox stim.   BLE wd to nox stim, effort vs gravity.     REFLEXES:                       R          L  Biceps:         2          2  Triceps:        2          2  Knee:           3         3  Ankle:          2          2    No clonus.    SENSORY:   Unable to fully assess due to pt's impaired mental status.  Wd/grimaces to nox stim.     COORDINATION:    Unable to assess due to pt's impaired mental status     GAIT:   Unable to assess as pt is intubated     Labs:   Results from last 72 hours   Lab Units 09/20/24  2341   WBC AUTO x10*3/uL 9.3   HEMOGLOBIN g/dL 16.0   HEMATOCRIT % 48.6   PLATELETS AUTO x10*3/uL 165      Results from last 72 hours   Lab Units 09/20/24  2341   SODIUM mmol/L 139   POTASSIUM mmol/L 4.5   CHLORIDE mmol/L 106   CO2 mmol/L 22   BUN mg/dL 18   CREATININE mg/dL 1.19   MAGNESIUM mg/dL 2.02   GLUCOSE mg/dL 195*      Results from last 72 hours   Lab Units 09/20/24  2341   ALK PHOS  U/L 97   AST U/L 18   ALT U/L 12   BILIRUBIN TOTAL mg/dL 0.6     Imaging:  CT head wo IV contrast    Result Date: 9/21/2024  No acute intracranial abnormality.     MACRO: None.   Signed by: Livan Doan 9/21/2024 1:43 AM Dictation workstation:   [id number]       Assessment:  Paddy Solis is a 73 y.o. 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 Will admit to NSU for close monitoring with EEG and optimize AED regimen as needed.     Plan:  NEURO:  #Breakthrough seizures  #C/f status epilepticus  #Bitemporal epilepsy  - CTH: no acute intracranial abnormality  - S/p 2g Keppra load (~0100, 9/21/24)  - Prelim EEG read: bitemporal epileptogenicity (frequent spikes) but no seizures   Plan:  NSU  Q1H neuro checks  cvEEG  Sedation: Propofol, will begin to wean given EEG prelim read negative   Increase Zonisamide 300 -> 400mg qHS  C/w Keppra 500mg BID  Follow drug levels and UA/CXR  CIWA protocol (last drink 9/19)  PT/OT/SLP     CARDIOVASCULAR:  #Afib  #HFrEF  Plan:  Continue to monitor on telemetry  Goal SBP normotension  Continue home eliquis  Hold home coreg given soft BP, resume if BP normalizes      RESPIRATORY:  #Hypoxic respiratory failure s/p intubation  CXR with no c/f pneumonia, fluid overload  Plan:  Continuous pulse oximetry   O2 PRN to maintain SpO2 > 94%, wean as tolerated  ETT 5.7cm from tigre, adjust tube on arrival and repeat CXR   After status epilepticus has been ruled out or sufficiently treated, can wean to extubate      RENAL/:  #MAYTE       Results from last 72 hours   Lab Units 09/20/24  2341   BUN mg/dL 18   CREATININE mg/dL 1.19       Plan:  Monitor with daily RFP        FEN/GI:  #MAYTE  Last BM:       Plan:  Monitor and replace electrolytes per protocol  IVF: 75cc/hr  Diet: NPO until swallow assessment  Bowel Regimen: Miralax    "  ENDOCRINE:  #MAYTE  No results found for: \"HGBA1C\"        Results from last 7 days   Lab Units 09/20/24  2341   GLUCOSE mg/dL 195*      Plan:  Accuchecks & ISS Q6H     HEMATOLOGY:  #MAYTE       Results from last 7 days   Lab Units 09/20/24  2341   HEMOGLOBIN g/dL 16.0   HEMATOCRIT % 48.6   PLATELETS AUTO x10*3/uL 165      Plan:  Continue to monitor with daily CBC and Coag panel     INFECTIOUS DISEASE:  #C/f sepsis       Results from last 7 days   Lab Units 09/20/24  2341   WBC AUTO x10*3/uL 9.3      S/p vanc/zosyn in ED  Blood cx (9/20/24): NGTD  Blood cx (9/20/24): NGTD     Plan:  Continue to monitor for s/sx of infection  Pan culture for temperature > 38.4 C  C/w vanc/zosyn pending blood cx negative for atleast 48 hours   Follow UA, blood cultures, MRSA nares     MUSCULOSKELETAL:  No acute issues     SKIN:  No acute issues  Turns and skin care per NSU protocol     ACCESS:  PIVs     PROPHYLAXIS:  DVT/VTE: Eliquis  GI: NA     RESTRAINTS:  I agree with nursing assessment of the patient's need for restraints to protect the patient from injury and facilitate healing. The patient is unable to cooperate with the plan of care and at risk for disrupting critical therapy (i.e., removing medical devices, lines, tubes and/or dressings).  Please see order for specifics. Restraints can be removed when the patient is able to cooperate with plan of care and allow healing to occur, or the medical devices at risk are discontinued by the medical team.       Pt discussed with the attending, Dr. Isidro, who agrees with the management plan.     John Chilel MD  PGY-3, Neurology  Epilepsy: [id number]    "

## 2024-09-21 NOTE — CONSULTS
Vancomycin Dosing by Pharmacy- INITIAL    Paddy Solis is a 73 y.o. year old male who Pharmacy has been consulted for vancomycin dosing for pneumonia. Based on the patient's indication and renal status this patient will be dosed based on a goal AUC of 400-600.     Renal function is currently stable.    Visit Vitals  /64   Pulse 100   Temp 36 °C (96.8 °F) (Temporal)   Resp 16        Lab Results   Component Value Date    CREATININE 1.19 2024    CREATININE 1.06 2023    CREATININE 1.20 2023    CREATININE 1.13 2023    CREATININE 1.39 (H) 2023        Patient weight is as follows: There were no vitals filed for this visit.    Cultures:  No results found for the encounter in last 14 days.        No intake/output data recorded.  I/O during current shift:  No intake/output data recorded.    Temp (24hrs), Av.5 °C (97.7 °F), Min:36 °C (96.8 °F), Max:37 °C (98.6 °F)         Assessment/Plan     Patient has already been given a loading dose of 1000 mg.  Will initiate vancomycin maintenance,  1000 mg every 24 hours.    This dosing regimen is predicted by [employer] to result in the following pharmacokinetic parameters:  Loading dose: N/A  Regimen: 1000 mg IV every 24 hours.  Start time: 01:03 on 2024  Exposure target: AUC24 (range)400-600 mg/L.hr   [id number]: 403 mg/L.hr  AUC24,ss: 446 mg/L.hr  Probability of AUC24 > 400: 66 %  Ctrough,ss: 11.2 mg/L  Probability of Ctrough,ss > 20: 8 %    Follow-up level will be ordered on  at AM unless clinically indicated sooner.  Will continue to monitor renal function daily while on vancomycin and order serum creatinine at least every 48 hours if not already ordered.  Follow for continued vancomycin needs, clinical response, and signs/symptoms of toxicity.       JEMAL COLE, PharmD

## 2024-09-21 NOTE — SIGNIFICANT EVENT
Preliminary/Baseline EEG Report:    - This vEEG shows bitemporal lobe epileptogenicity. No seizures are recorded in this baseline file.           This EEG was read until 12:13 PM on 09/21/24 .       The final impression will be available tomorrow under Chart Review in the Media tab.     Thad Leo DO  Adult Epilepsy Fellow  [facility]

## 2024-09-21 NOTE — CARE PLAN
The patient's goals for the shift include        Problem: Skin  Goal: Decreased wound size/increased tissue granulation at next dressing change  Outcome: Progressing  Flowsheets (Taken 9/21/2024 1945)  Decreased wound size/increased tissue granulation at next dressing change: Promote sleep for wound healing  Goal: Participates in plan/prevention/treatment measures  Outcome: Progressing  Flowsheets (Taken 9/21/2024 1945)  Participates in plan/prevention/treatment measures: Elevate heels  Goal: Prevent/manage excess moisture  Outcome: Progressing  Flowsheets (Taken 9/21/2024 1945)  Prevent/manage excess moisture:   Cleanse incontinence/protect with barrier cream   Moisturize dry skin  Goal: Prevent/minimize sheer/friction injuries  Outcome: Progressing  Flowsheets (Taken 9/21/2024 1945)  Prevent/minimize sheer/friction injuries:   Complete micro-shifts as needed if patient unable. Adjust patient position to relieve pressure points, not a full turn   HOB 30 degrees or less   Turn/reposition every 2 hours/use positioning/transfer devices   Utilize specialty bed per algorithm   Use pull sheet  Goal: Promote/optimize nutrition  Outcome: Progressing  Flowsheets (Taken 9/21/2024 1945)  Promote/optimize nutrition: Discuss with provider if NPO > 2 days  Goal: Promote skin healing  Outcome: Progressing  Flowsheets (Taken 9/21/2024 1945)  Promote skin healing: Turn/reposition every 2 hours/use positioning/transfer devices     Problem: Pain - Adult  Goal: Verbalizes/displays adequate comfort level or baseline comfort level  Outcome: Progressing     Problem: Safety - Adult  Goal: Free from fall injury  Outcome: Progressing     Problem: Discharge Planning  Goal: Discharge to home or other facility with appropriate resources  Outcome: Progressing     Problem: Chronic Conditions and Co-morbidities  Goal: Patient's chronic conditions and co-morbidity symptoms are monitored and maintained or improved  Outcome: Progressing

## 2024-09-22 ENCOUNTER — APPOINTMENT (OUTPATIENT)
Dept: RADIOLOGY | Facility: HOSPITAL | Age: 74
DRG: 100 | End: 2024-09-22
Payer: MEDICARE

## 2024-09-22 LAB
ALBUMIN SERPL BCP-MCNC: 3 G/DL (ref 3.4–5)
ANION GAP SERPL CALC-SCNC: 11 MMOL/L (ref 10–20)
APTT PPP: 37 SECONDS (ref 27–38)
BACTERIA BLD CULT: NORMAL
BACTERIA BLD CULT: NORMAL
BACTERIA UR CULT: NO GROWTH
BASOPHILS # BLD AUTO: 0.04 X10*3/UL (ref 0–0.1)
BASOPHILS NFR BLD AUTO: 0.5 %
BUN SERPL-MCNC: 14 MG/DL (ref 6–23)
CA-I BLD-SCNC: 1.2 MMOL/L (ref 1.1–1.33)
CALCIUM SERPL-MCNC: 8.8 MG/DL (ref 8.6–10.6)
CHLORIDE SERPL-SCNC: 109 MMOL/L (ref 98–107)
CO2 SERPL-SCNC: 23 MMOL/L (ref 21–32)
CREAT SERPL-MCNC: 1.34 MG/DL (ref 0.5–1.3)
EGFRCR SERPLBLD CKD-EPI 2021: 56 ML/MIN/1.73M*2
EOSINOPHIL # BLD AUTO: 0.04 X10*3/UL (ref 0–0.4)
EOSINOPHIL NFR BLD AUTO: 0.5 %
ERYTHROCYTE [DISTWIDTH] IN BLOOD BY AUTOMATED COUNT: 13.4 % (ref 11.5–14.5)
GLUCOSE BLD MANUAL STRIP-MCNC: 75 MG/DL (ref 74–99)
GLUCOSE BLD MANUAL STRIP-MCNC: 81 MG/DL (ref 74–99)
GLUCOSE BLD MANUAL STRIP-MCNC: 82 MG/DL (ref 74–99)
GLUCOSE BLD MANUAL STRIP-MCNC: 88 MG/DL (ref 74–99)
GLUCOSE BLD MANUAL STRIP-MCNC: 91 MG/DL (ref 74–99)
GLUCOSE SERPL-MCNC: 115 MG/DL (ref 74–99)
HCT VFR BLD AUTO: 37 % (ref 41–52)
HGB BLD-MCNC: 12.9 G/DL (ref 13.5–17.5)
IMM GRANULOCYTES # BLD AUTO: 0.04 X10*3/UL (ref 0–0.5)
IMM GRANULOCYTES NFR BLD AUTO: 0.5 % (ref 0–0.9)
INR PPP: 1.2 (ref 0.9–1.1)
LYMPHOCYTES # BLD AUTO: 1.84 X10*3/UL (ref 0.8–3)
LYMPHOCYTES NFR BLD AUTO: 22.1 %
MAGNESIUM SERPL-MCNC: 1.91 MG/DL (ref 1.6–2.4)
MCH RBC QN AUTO: 32.1 PG (ref 26–34)
MCHC RBC AUTO-ENTMCNC: 34.9 G/DL (ref 32–36)
MCV RBC AUTO: 92 FL (ref 80–100)
MONOCYTES # BLD AUTO: 0.66 X10*3/UL (ref 0.05–0.8)
MONOCYTES NFR BLD AUTO: 7.9 %
NEUTROPHILS # BLD AUTO: 5.71 X10*3/UL (ref 1.6–5.5)
NEUTROPHILS NFR BLD AUTO: 68.5 %
NRBC BLD-RTO: 0 /100 WBCS (ref 0–0)
PHOSPHATE SERPL-MCNC: 3.1 MG/DL (ref 2.5–4.9)
PLATELET # BLD AUTO: 109 X10*3/UL (ref 150–450)
POTASSIUM SERPL-SCNC: 3.4 MMOL/L (ref 3.5–5.3)
PROTHROMBIN TIME: 13.9 SECONDS (ref 9.8–12.8)
RBC # BLD AUTO: 4.02 X10*6/UL (ref 4.5–5.9)
SODIUM SERPL-SCNC: 140 MMOL/L (ref 136–145)
VANCOMYCIN SERPL-MCNC: 46 UG/ML (ref 5–20)
VANCOMYCIN TROUGH SERPL-MCNC: 13.9 UG/ML (ref 5–20)
WBC # BLD AUTO: 8.3 X10*3/UL (ref 4.4–11.3)

## 2024-09-22 PROCEDURE — 80069 RENAL FUNCTION PANEL: CPT

## 2024-09-22 PROCEDURE — 95716 VEEG EA 12-26HR CONT MNTR: CPT

## 2024-09-22 PROCEDURE — 2500000004 HC RX 250 GENERAL PHARMACY W/ HCPCS (ALT 636 FOR OP/ED)

## 2024-09-22 PROCEDURE — 80202 ASSAY OF VANCOMYCIN: CPT

## 2024-09-22 PROCEDURE — 82330 ASSAY OF CALCIUM: CPT

## 2024-09-22 PROCEDURE — 2500000001 HC RX 250 WO HCPCS SELF ADMINISTERED DRUGS (ALT 637 FOR MEDICARE OP)

## 2024-09-22 PROCEDURE — 99291 CRITICAL CARE FIRST HOUR: CPT

## 2024-09-22 PROCEDURE — 85025 COMPLETE CBC W/AUTO DIFF WBC: CPT

## 2024-09-22 PROCEDURE — 85610 PROTHROMBIN TIME: CPT

## 2024-09-22 PROCEDURE — 95720 EEG PHY/QHP EA INCR W/VEEG: CPT | Performed by: STUDENT IN AN ORGANIZED HEALTH CARE EDUCATION/TRAINING PROGRAM

## 2024-09-22 PROCEDURE — 94003 VENT MGMT INPAT SUBQ DAY: CPT

## 2024-09-22 PROCEDURE — 71045 X-RAY EXAM CHEST 1 VIEW: CPT

## 2024-09-22 PROCEDURE — 2020000001 HC ICU ROOM DAILY

## 2024-09-22 PROCEDURE — 36415 COLL VENOUS BLD VENIPUNCTURE: CPT

## 2024-09-22 PROCEDURE — 83735 ASSAY OF MAGNESIUM: CPT

## 2024-09-22 PROCEDURE — 99232 SBSQ HOSP IP/OBS MODERATE 35: CPT

## 2024-09-22 PROCEDURE — 2500000005 HC RX 250 GENERAL PHARMACY W/O HCPCS: Performed by: PSYCHIATRY & NEUROLOGY

## 2024-09-22 PROCEDURE — 82947 ASSAY GLUCOSE BLOOD QUANT: CPT

## 2024-09-22 PROCEDURE — 71045 X-RAY EXAM CHEST 1 VIEW: CPT | Performed by: RADIOLOGY

## 2024-09-22 RX ADMIN — Medication 1 TABLET: at 08:05

## 2024-09-22 RX ADMIN — Medication 40 PERCENT: at 08:05

## 2024-09-22 RX ADMIN — PYRIDOXINE HCL TAB 50 MG 50 MG: 50 TAB at 08:05

## 2024-09-22 RX ADMIN — APIXABAN 5 MG: 5 TABLET, FILM COATED ORAL at 08:05

## 2024-09-22 RX ADMIN — APIXABAN 5 MG: 5 TABLET, FILM COATED ORAL at 21:06

## 2024-09-22 RX ADMIN — Medication 40 PERCENT: at 20:00

## 2024-09-22 RX ADMIN — Medication 75 MCG/HR: at 03:33

## 2024-09-22 RX ADMIN — VANCOMYCIN HYDROCHLORIDE 1000 MG: 1 INJECTION, SOLUTION INTRAVENOUS at 00:03

## 2024-09-22 RX ADMIN — THIAMINE HYDROCHLORIDE 500 MG: 100 INJECTION, SOLUTION INTRAMUSCULAR; INTRAVENOUS at 22:27

## 2024-09-22 RX ADMIN — Medication 40 PERCENT: at 00:47

## 2024-09-22 RX ADMIN — PROPOFOL 50 MCG/KG/MIN: 10 INJECTION, EMULSION INTRAVENOUS at 09:30

## 2024-09-22 RX ADMIN — PIPERACILLIN SODIUM AND TAZOBACTAM SODIUM 3.38 G: 3; .375 INJECTION, SOLUTION INTRAVENOUS at 03:03

## 2024-09-22 RX ADMIN — SODIUM CHLORIDE 75 ML/HR: 9 INJECTION, SOLUTION INTRAVENOUS at 16:21

## 2024-09-22 RX ADMIN — THIAMINE HYDROCHLORIDE 500 MG: 100 INJECTION, SOLUTION INTRAMUSCULAR; INTRAVENOUS at 14:00

## 2024-09-22 RX ADMIN — PIPERACILLIN SODIUM AND TAZOBACTAM SODIUM 3.38 G: 3; .375 INJECTION, SOLUTION INTRAVENOUS at 15:39

## 2024-09-22 RX ADMIN — LEVETIRACETAM 500 MG: 5 INJECTION INTRAVENOUS at 21:13

## 2024-09-22 RX ADMIN — PROPOFOL 50 MCG/KG/MIN: 10 INJECTION, EMULSION INTRAVENOUS at 18:50

## 2024-09-22 RX ADMIN — PROPOFOL 50 MCG/KG/MIN: 10 INJECTION, EMULSION INTRAVENOUS at 14:00

## 2024-09-22 RX ADMIN — LEVETIRACETAM 500 MG: 5 INJECTION INTRAVENOUS at 09:30

## 2024-09-22 RX ADMIN — FOLIC ACID 1 MG: 1 TABLET ORAL at 08:05

## 2024-09-22 RX ADMIN — Medication 75 MCG/HR: at 16:21

## 2024-09-22 RX ADMIN — PIPERACILLIN SODIUM AND TAZOBACTAM SODIUM 3.38 G: 3; .375 INJECTION, SOLUTION INTRAVENOUS at 21:13

## 2024-09-22 RX ADMIN — THIAMINE HYDROCHLORIDE 500 MG: 100 INJECTION, SOLUTION INTRAMUSCULAR; INTRAVENOUS at 05:03

## 2024-09-22 RX ADMIN — PIPERACILLIN SODIUM AND TAZOBACTAM SODIUM 3.38 G: 3; .375 INJECTION, SOLUTION INTRAVENOUS at 09:30

## 2024-09-22 RX ADMIN — POLYETHYLENE GLYCOL 3350 17 G: 17 POWDER, FOR SOLUTION ORAL at 08:05

## 2024-09-22 RX ADMIN — PROPOFOL 40 MCG/KG/MIN: 10 INJECTION, EMULSION INTRAVENOUS at 03:29

## 2024-09-22 RX ADMIN — SODIUM CHLORIDE 75 ML/HR: 9 INJECTION, SOLUTION INTRAVENOUS at 01:52

## 2024-09-22 RX ADMIN — ZONISAMIDE 400 MG: 100 SUSPENSION ORAL at 21:22

## 2024-09-22 ASSESSMENT — LIFESTYLE VARIABLES
ANXIETY: NO ANXIETY, AT EASE
AGITATION: NORMAL ACTIVITY
PAROXYSMAL SWEATS: NO SWEAT VISIBLE
ORIENTATION AND CLOUDING OF SENSORIUM: ORIENTED AND CAN DO SERIAL ADDITIONS
NAUSEA AND VOMITING: NO NAUSEA AND NO VOMITING
AUDITORY DISTURBANCES: NOT PRESENT
HEADACHE, FULLNESS IN HEAD: NOT PRESENT
VISUAL DISTURBANCES: NOT PRESENT
TOTAL SCORE: 0
TREMOR: NO TREMOR

## 2024-09-22 ASSESSMENT — PAIN - FUNCTIONAL ASSESSMENT
PAIN_FUNCTIONAL_ASSESSMENT: CPOT (CRITICAL CARE PAIN OBSERVATION TOOL)

## 2024-09-22 NOTE — PROGRESS NOTES
Paddy Solis is a 74 y.o. male on day 1 of admission presenting with Status epilepticus (Multi).      Subjective   Patient was intubated and sedated on propofol and fetanyl on exam. Cough, gag, and corneal blink reflexes intact.          Objective     Last Recorded Vitals  Blood pressure 101/79, pulse 87, temperature 36.2 °C (97.2 °F), resp. rate 16, weight 65 kg (143 lb 4.8 oz), SpO2 100%.    Physical Exam  HENT:      Head: Normocephalic.   Eyes:      Conjunctiva/sclera: Conjunctivae normal.   Cardiovascular:      Rate and Rhythm: Normal rate and regular rhythm.   Pulmonary:      Breath sounds: Normal breath sounds.      Comments: Intubted  Skin:     General: Skin is warm and dry.       Neurological Exam  Mental Status    Patient intubated and sedated on exam.    Cranial Nerves  CN V:  Right: Normal corneal reflex.  Left: Normal corneal reflex.  CN IX, X: Normal gag reflex.  Patient intubated and sedated; intact cough, gag, and corneal blink reflexes.    Motor    Sedated on exam.    Sensory  Sedated; did not withdraw to noxious stimuli.    Relevant Results  Scheduled medications  apixaban, 5 mg, oral, BID  [Held by provider] carvedilol, 25 mg, oral, BID  folic acid, 1 mg, oral, Daily  insulin lispro, 0-5 Units, subcutaneous, TID  levETIRAcetam, 500 mg, intravenous, q12h  multivitamin with minerals, 1 tablet, oral, Daily  oxygen, , inhalation, Continuous - Inhalation  piperacillin-tazobactam, 3.375 g, intravenous, q6h  polyethylene glycol, 17 g, oral, Daily  pyridoxine, 50 mg, oral, Daily  [START ON 9/24/2024] thiamine, 100 mg, oral, Daily  thiamine, 500 mg, intravenous, q8h PIPO  zonisamide, 400 mg, orogastric tube, Nightly      Continuous medications  fentaNYL,  mcg/hr, Last Rate: 75 mcg/hr (09/22/24 1100)  propofol, 0-50 mcg/kg/min, Last Rate: 50 mcg/kg/min (09/22/24 1100)  sodium chloride 0.9%, 75 mL/hr, Last Rate: 75 mL/hr (09/22/24 1100)      PRN medications  PRN medications: acetaminophen, dextrose,  dextrose, glucagon, glucagon, LORazepam **OR** LORazepam **OR** LORazepam, magnesium sulfate, magnesium sulfate, potassium chloride CR **OR** potassium chloride, potassium chloride CR **OR** potassium chloride, potassium chloride                            This patient has a urinary catheter   Reason for the urinary catheter remaining today? Patient is sedated and intubated.    This patient is intubated   Reason for patient to remain intubated today? they are unable to protect their airway           Assessment/Plan      Assessment & Plan  Status epilepticus (Multi)    Paddy Solis is a 73 y.o. 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 Admitted to NSU on 9/21/2024 for close monitoring with EEG and optimize AED regimen as needed.       EPILEPTIC Paroxysmal Episodes   Semiology:   1. Automotor > R Version > GTC w/ pos ictal psychosis** (left temporal onset)  2. Automotor seizure (D) (right temporal onset)  - Onset: April of 2021, possibly 1 year earlier (episodes of confusion)  - Frequency: **None since 05/23   EZ: Bitemporal  Etiology: unknown   Comorbidities: HFrEF, NICM, afib on Eliquis   Prior AEDs: OXC (hyponatremia), Depakote (cognitive side effects)  Current AEDs:  mg at bedtime, keppra XR 1000mg at bedtime --> now ZNS 400mg and Keppra 500mg BID    #Breakthrough Seizures  #CF status epilepticus  #Bitemporal epilepsy   - CTH: no acute intracranial abnormality  - S/p 2g Keppra load (~0100, 9/21/24)  - Prelim EEG read: bitemporal epileptogenicity (frequent spikes) but no seizures     Plan:  - Admitted to NSU with Q1H neuro checks  - cvEEG  - Plan to wean sedation as tolerated and continue to monitor cvEEG  - Increased Zonisamide 300 -> 400mg qHS  - C/w Keppra 500mg BID  - Follow medication levels and UA/CXR  - CIWA protocol (last drink 9/19)  -  PT/OT/SLP    #Afib  #HFrEF  - Continue tele  - Continue home eliquis  - Hold home coreg given persistent hypotension     #Concern for Sepsis  - Hypotensive on arrival to ED in [place]; continues to be hypotensive in NSU  - Blood cultures (9/20/2024) taken and started on vanc/zosyn   - MRSA nares negative  - UA positive for 500 LE, blood, WBCs, RBCs, and protein, urine culture shows no growth to date    Plan:   - Vanc D/C'd on 9/22; zosyn continued pending infectious workup    #Hypoxic respiratory failure s/p intubation  - CXR with no c/f pneumonia, fluid overload  - After status epilepticus has been ruled out or sufficiently treated, can wean to extubate     Maura Reynolds MD  PGY-1

## 2024-09-22 NOTE — HOSPITAL COURSE
"Paddy Solis is a 73 y.o. 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      History of Present Illness:   Per ED note at [place]:   \"Sent in from home by EMS with report of witnessed seizure. According to EMS the patient had a seizure at home. EMS was called on scene the patient had 2 more seizures. He was given 5 mg of Versed IV push and route. He remains postictal upon arrival. \"     On arrival to [place] ED:  /82, , SPO2 83%, RR 30  He was intubated and BP post intubation was 91/50, given hypotension, there was c/f sepsis so blood cultures were collected and pt was started on vanc/zosyn  WBC 9.3, RFP/LFTs wnl, troponin 11  Covid negative  Lactate 5.2 -> 1.4  Utox positive for cannabinoids, fentanyl, benzodiazepines (utox sent after Versed push and fent push for intubation)  Serum tox negative, alcohol level <10  Keppra, ZNS levels pending  CTH without acute intracranial abnormalities     ED intervention: 2g keppra load, intubated for airway protection, on prop/fent for sedation, started vanc/zosyn and sent blood cultures x2     Pt remained sedated and unresponsive post intubation without clinical seizure-like activity. Pt transferred to [facility] for EEG monitoring to rule out subclinical status epilepticus.     On arrival to [facility], pt remains intubated and sedated, on prop/fent. Per RN, pt noted to have subtle generalized twitching and R gaze deviation c/f seizures, so was bolused prop and increased to 50 mcg/kg/min with subsequent resolution.      History obtained from telephone call with pt's wife. She reports that pt woke up at ~0600 today and she noted he was sweating, then went back to sleep. He woke up later this morning then let out a cry, prompting his wife to go into his room and noted that he was unresponsive, staring straight ahead " with generalized shaking. She described the shaking as twitching rather than generalized convulsions and noted that this was similar to his usual seizures but more mild. Otherwise, she denied tongue biting, urinary/bowel incontinence. This lasted ~1 min and she noted that he was very drowsy and lethargic afterwards. This prompted her to call EMS and pt was taken to [place].      Per wife, pt's seizures have been very well controlled on current AED regimen with last seizure occurring >1 year ago (05/2023). He has been compliant with his meds and hasn't missed any doses however, she notes that he had split up his Keppra dose and was taking 500mg twice a day instead of 1000mg at bedtime. Pt usually manages his meds on his own so she is unsure how long he has been doing this but she estimates ~1 month and stated that he reportedly did not tolerate taking the 1000mg at bedtime. She also reports that he has a history of binge drinking and may have been drinking this past week as she has been out of town, with his last drink on 9/19. She also notes that he has an erratic sleep schedule, which she suspects may have contributed. Otherwise, she denied recent fever/chills, med changes, URI/UTI symptoms.     Patient is in NSU sedated and intubated on 9/22. Patient continued to be weaned off sedation and oxygen requirements, he was extubated 09/24 and sedation requirements discontinued 09/25. EEG was unremarkable and discontinued on 09/24 with no concerns of continued seizures. The patients affect and strength greatly improved on 09/25 and was assessed by PT/OT, recommending high intensity rehab. The patient remained at [facility] awaiting precert, and while waiting the patient was reassessed and progessed greatly and the PT team changed recommendations to home PT. The patient was discharged on 09/30 to home.    Changes in Anti-seizure Medications:  - Increase Zonisamide 300 mg--->400 mg nightly  - Change keppra 1000 mg daily-->500 mg  BID

## 2024-09-22 NOTE — ASSESSMENT & PLAN NOTE
Paddy Solis is a 73 y.o. 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 Admitted to NSU on 9/21/2024 for close monitoring with EEG and optimize AED regimen as needed.

## 2024-09-22 NOTE — PROGRESS NOTES
Communication Note    Patient Name: Paddy Solis  MRN: [id number]  Today's Date: 9/22/2024   Room: 14/14A    Discipline: Physical Therapy      Missed Visit: Yes  Missed Visit Reason:  (Per RN, pt not following commands and becomes agitated with stimulation. Plan to hold PT today.)      09/22/24 at 8:10 AM   Gwen Coronado PT   Rehab Office: [phone]

## 2024-09-22 NOTE — PROGRESS NOTES
Occupational Therapy    Communication Note    Patient Name: Paddy Solis  MRN: [id number]  Today's Date: 9/22/2024   Room: 14/14A    Discipline: Occupational Therapy      Missed Visit: Yes  Missed Visit Reason:  (RN reports patient not following commands, becoming agitated with stim; will hold and attempt OT next visit as appropriate.)      09/22/24 at 8:05 AM   Emily Witt OT   Rehab Office: [phone]

## 2024-09-22 NOTE — PROGRESS NOTES
Vancomycin Dosing by Pharmacy- FOLLOW UP    Paddy Solis is a 74 y.o. year old male who Pharmacy has been consulted for vancomycin dosing for pneumonia. Based on the patient's indication and renal status this patient is being dosed based on a goal AUC of 400-600.     Renal function is currently stable.    Current vancomycin dose: 1000 mg given every 24 hours    Estimated vancomycin AUC on current dose: 507 mg/L.hr     Visit Vitals  /79   Pulse 87   Temp 36.2 °C (97.2 °F)   Resp 16        Lab Results   Component Value Date    CREATININE 1.34 (H) 2024    CREATININE 1.18 2024    CREATININE 1.19 2024    CREATININE 1.06 2023    CREATININE 1.20 2023    CREATININE 1.13 2023    CREATININE 1.39 (H) 2023        Patient weight is as follows:   Vitals:    24 0600   Weight: 65 kg (143 lb 4.8 oz)       Cultures:  No results found for the encounter in last 14 days.       I/O last 3 completed shifts:  In: 2777.1 (42.7 mL/kg) [I.V.:2017.1 (31 mL/kg); NG/GT:250; IV Piggyback:510]  Out: 780 (12 mL/kg) [Urine:780 (0.3 mL/kg/hr)]  Weight: 65 kg   I/O during current shift:  No intake/output data recorded.    Temp (24hrs), Av.2 °C (97.1 °F), Min:36 °C (96.8 °F), Max:36.3 °C (97.3 °F)      Assessment/Plan    Within goal AUC range. Continue current vancomycin regimen.    This dosing regimen is predicted by [employer] to result in the following pharmacokinetic parameters:    Regimen: 1000 mg IV every 24 hours.  Start time: 00:03 on 2024  Exposure target: AUC24 (range)400-600 mg/L.hr   VCW90-31: 468 mg/L.hr  AUC24,ss: 507 mg/L.hr  Probability of AUC24 > 400: 87 %  Ctrough,ss: 13.3 mg/L  Probability of Ctrough,ss > 20: 10 %    The next level will be obtained on 9/25 AM labs. May be obtained sooner if clinically indicated.   Will continue to monitor renal function daily while on vancomycin and order serum creatinine at least every 48 hours if not already ordered.  Follow for  continued vancomycin needs, clinical response, and signs/symptoms of toxicity.     Latisha Brownlee, RPh

## 2024-09-22 NOTE — PROGRESS NOTES
Vancomycin Dosing by Pharmacy- Cessation of Therapy    Consult to pharmacy for vancomycin dosing has been discontinued by the prescriber, pharmacy will sign off at this time.    Please call pharmacy if there are further questions or re-enter a consult if vancomycin is resumed.     Homer Youngblood, YeimyD

## 2024-09-22 NOTE — PROGRESS NOTES
[facility]  NEUROSCIENCE INTENSIVE CARE UNIT  DAILY PROGRESS NOTE       Patient Name: Paddy Solis   MRN: [id number]     Admit Date: 2024     : 1950 AGE: 74 y.o. GENDER: male        Subjective    74 y.o. 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  Admitted 2024 with Status epilepticus (Multi) after presenting  to [facility] as a transfer from [place] with c/f status epilepticus. Admitted to NSU for close monitoring with EEG and optimize AED regimen as needed. Patient arrived intubated and sedated on propofol. Loaded with 2g of Keppra PTA. CTH obtained was unremarkable.    Significant Events:  - Admitted to NSU  - On arrival there was concern patient was having an active seizure with generlized shaking with eyes rolled back & eyes deviated to the right. Resolved on arrival to evaluate. Event lasted >1 minute. Was bolused with Prop and rate increased to 50mcg/min    Interval Events:  No acute events overnight.  Preliminary EEG with bitemporal lobe epileptogenicity. No seizures. Sedation set at Fent 75 & prop 50.  Zonisamide was increased to 400mg nightly       Objective   VITALS (24H):  Temp:  [36 °C (96.8 °F)-36.3 °C (97.3 °F)] 36.2 °C (97.2 °F)  Heart Rate:  [] 87  Resp:  [14-24] 16  BP: ()/(57-90) 101/79  FiO2 (%):  [40 %] 40 %  INTAKE/OUTPUT:  Intake/Output Summary (Last 24 hours) at 2024 0733  Last data filed at 2024 0600  Gross per 24 hour   Intake 2777.09 ml   Output 780 ml   Net 1997.09 ml     VENT SETTINGS:  Vent Mode: Volume control/assist control  FiO2 (%):  [40 %] 40 %  S RR:  [16] 16  S VT:  [450 mL-4750 mL] 450 mL  PEEP/CPAP (cm H2O):  [5 cm H20-58 cm H20] 5 cm H20  MAP (cm H2O):  [8-10] 9.4     PHYSICAL EXAM:  NEURO:  - Intubated/Sedated on 50mcg Prop  - ECNS, PERRL, midline gaze, No withdrawal to nox stim.  CV:  - RRR on telemetry, NSR  RESP:  - Intubated  - PC/AC. FiO2  40%  :  - indwelling catheter in place  GI:  - Abdomen NT/ND, soft  SKIN:  - Intact    MEDICATIONS:  Scheduled: PRN: Continuous:   apixaban, 5 mg, oral, BID  [Held by provider] carvedilol, 25 mg, oral, BID  folic acid, 1 mg, oral, Daily  insulin lispro, 0-5 Units, subcutaneous, TID  levETIRAcetam, 500 mg, intravenous, q12h  multivitamin with minerals, 1 tablet, oral, Daily  oxygen, , inhalation, Continuous - Inhalation  piperacillin-tazobactam, 3.375 g, intravenous, q6h  polyethylene glycol, 17 g, oral, Daily  pyridoxine, 50 mg, oral, Daily  [START ON 9/24/2024] thiamine, 100 mg, oral, Daily  thiamine, 500 mg, intravenous, q8h PIPO  vancomycin, 1,000 mg, intravenous, q24h  zonisamide, 400 mg, orogastric tube, Nightly     PRN medications: acetaminophen, dextrose, dextrose, glucagon, glucagon, LORazepam **OR** LORazepam **OR** LORazepam, magnesium sulfate, magnesium sulfate, potassium chloride CR **OR** potassium chloride, potassium chloride CR **OR** potassium chloride, potassium chloride, vancomycin fentaNYL,  mcg/hr, Last Rate: 75 mcg/hr (09/22/24 0600)  propofol, 0-50 mcg/kg/min, Last Rate: 50 mcg/kg/min (09/22/24 0720)  sodium chloride 0.9%, 75 mL/hr, Last Rate: 75 mL/hr (09/22/24 0600)         LAB RESULTS:  Results for orders placed or performed during the hospital encounter of 09/21/24 (from the past 24 hour(s))   Coagulation Screen   Result Value Ref Range    Protime 12.4 9.8 - 12.8 seconds    INR 1.1 0.9 - 1.1    aPTT 26 (L) 27 - 38 seconds   CBC and Auto Differential   Result Value Ref Range    WBC 11.8 (H) 4.4 - 11.3 x10*3/uL    nRBC 0.0 0.0 - 0.0 /100 WBCs    RBC 4.71 4.50 - 5.90 x10*6/uL    Hemoglobin 14.9 13.5 - 17.5 g/dL    Hematocrit 43.9 41.0 - 52.0 %    MCV 93 80 - 100 fL    MCH 31.6 26.0 - 34.0 pg    MCHC 33.9 32.0 - 36.0 g/dL    RDW 13.4 11.5 - 14.5 %    Platelets 83 (L) 150 - 450 x10*3/uL    Neutrophils % 74.5 40.0 - 80.0 %    Immature Granulocytes %, Automated 0.3 0.0 - 0.9 %     Lymphocytes % 15.7 13.0 - 44.0 %    Monocytes % 9.1 2.0 - 10.0 %    Eosinophils % 0.1 0.0 - 6.0 %    Basophils % 0.3 0.0 - 2.0 %    Neutrophils Absolute 8.80 (H) 1.60 - 5.50 x10*3/uL    Immature Granulocytes Absolute, Automated 0.04 0.00 - 0.50 x10*3/uL    Lymphocytes Absolute 1.85 0.80 - 3.00 x10*3/uL    Monocytes Absolute 1.08 (H) 0.05 - 0.80 x10*3/uL    Eosinophils Absolute 0.01 0.00 - 0.40 x10*3/uL    Basophils Absolute 0.03 0.00 - 0.10 x10*3/uL   Calcium, ionized   Result Value Ref Range    POCT Calcium, Ionized 1.16 1.1 - 1.33 mmol/L   Urinalysis with Reflex Culture and Microscopic   Result Value Ref Range    Color, Urine Light-Brown (N) Light-Yellow, Yellow, Dark-Yellow    Appearance, Urine Ex.Turbid (N) Clear    Specific Gravity, Urine 1.010 1.005 - 1.035    pH, Urine 6.0 5.0, 5.5, 6.0, 6.5, 7.0, 7.5, 8.0    Protein, Urine 50 (1+) (A) NEGATIVE, 10 (TRACE), 20 (TRACE) mg/dL    Glucose, Urine Normal Normal mg/dL    Blood, Urine OVER (3+) (A) NEGATIVE    Ketones, Urine NEGATIVE NEGATIVE mg/dL    Bilirubin, Urine NEGATIVE NEGATIVE    Urobilinogen, Urine Normal Normal mg/dL    Nitrite, Urine NEGATIVE NEGATIVE    Leukocyte Esterase, Urine 500 David/µL (A) NEGATIVE   Microscopic Only, Urine   Result Value Ref Range    WBC, Urine >50 (A) 1-5, NONE /HPF    WBC Clumps, Urine MANY Reference range not established. /HPF    RBC, Urine >20 (A) NONE, 1-2, 3-5 /HPF    Squamous Epithelial Cells, Urine 1-9 (SPARSE) Reference range not established. /HPF    Mucus, Urine FEW Reference range not established. /LPF   MRSA Surveillance for Vancomycin De-escalation, PCR    Specimen: Anterior Nares; Swab   Result Value Ref Range    MRSA PCR Not Detected Not Detected   Renal Function Panel   Result Value Ref Range    Glucose 86 74 - 99 mg/dL    Sodium 136 136 - 145 mmol/L    Potassium 5.2 3.5 - 5.3 mmol/L    Chloride 103 98 - 107 mmol/L    Bicarbonate 24 21 - 32 mmol/L    Anion Gap 14 10 - 20 mmol/L    Urea Nitrogen 15 6 - 23 mg/dL     Creatinine 1.18 0.50 - 1.30 mg/dL    eGFR 66 >60 mL/min/1.73m*2    Calcium 8.7 8.6 - 10.6 mg/dL    Phosphorus 3.7 2.5 - 4.9 mg/dL    Albumin 3.5 3.4 - 5.0 g/dL   Magnesium   Result Value Ref Range    Magnesium 1.97 1.60 - 2.40 mg/dL   POCT GLUCOSE   Result Value Ref Range    POCT Glucose 89 74 - 99 mg/dL   POCT GLUCOSE   Result Value Ref Range    POCT Glucose 93 74 - 99 mg/dL   POCT GLUCOSE   Result Value Ref Range    POCT Glucose 91 74 - 99 mg/dL   POCT GLUCOSE   Result Value Ref Range    POCT Glucose 105 (H) 74 - 99 mg/dL   Coagulation Screen   Result Value Ref Range    Protime 13.9 (H) 9.8 - 12.8 seconds    INR 1.2 (H) 0.9 - 1.1    aPTT 37 27 - 38 seconds   CBC and Auto Differential   Result Value Ref Range    WBC 8.3 4.4 - 11.3 x10*3/uL    nRBC 0.0 0.0 - 0.0 /100 WBCs    RBC 4.02 (L) 4.50 - 5.90 x10*6/uL    Hemoglobin 12.9 (L) 13.5 - 17.5 g/dL    Hematocrit 37.0 (L) 41.0 - 52.0 %    MCV 92 80 - 100 fL    MCH 32.1 26.0 - 34.0 pg    MCHC 34.9 32.0 - 36.0 g/dL    RDW 13.4 11.5 - 14.5 %    Platelets 109 (L) 150 - 450 x10*3/uL    Neutrophils % 68.5 40.0 - 80.0 %    Immature Granulocytes %, Automated 0.5 0.0 - 0.9 %    Lymphocytes % 22.1 13.0 - 44.0 %    Monocytes % 7.9 2.0 - 10.0 %    Eosinophils % 0.5 0.0 - 6.0 %    Basophils % 0.5 0.0 - 2.0 %    Neutrophils Absolute 5.71 (H) 1.60 - 5.50 x10*3/uL    Immature Granulocytes Absolute, Automated 0.04 0.00 - 0.50 x10*3/uL    Lymphocytes Absolute 1.84 0.80 - 3.00 x10*3/uL    Monocytes Absolute 0.66 0.05 - 0.80 x10*3/uL    Eosinophils Absolute 0.04 0.00 - 0.40 x10*3/uL    Basophils Absolute 0.04 0.00 - 0.10 x10*3/uL   Renal Function Panel   Result Value Ref Range    Glucose 115 (H) 74 - 99 mg/dL    Sodium 140 136 - 145 mmol/L    Potassium 3.4 (L) 3.5 - 5.3 mmol/L    Chloride 109 (H) 98 - 107 mmol/L    Bicarbonate 23 21 - 32 mmol/L    Anion Gap 11 10 - 20 mmol/L    Urea Nitrogen 14 6 - 23 mg/dL    Creatinine 1.34 (H) 0.50 - 1.30 mg/dL    eGFR 56 (L) >60 mL/min/1.73m*2     Calcium 8.8 8.6 - 10.6 mg/dL    Phosphorus 3.1 2.5 - 4.9 mg/dL    Albumin 3.0 (L) 3.4 - 5.0 g/dL   Magnesium   Result Value Ref Range    Magnesium 1.91 1.60 - 2.40 mg/dL   Calcium, ionized   Result Value Ref Range    POCT Calcium, Ionized 1.20 1.1 - 1.33 mmol/L   Vancomycin   Result Value Ref Range    Vancomycin 46.0 (H) 5.0 - 20.0 ug/mL   POCT GLUCOSE   Result Value Ref Range    POCT Glucose 82 74 - 99 mg/dL   Vancomycin, Trough   Result Value Ref Range    Vancomycin, Trough 13.9 5.0 - 20.0 ug/mL   POCT GLUCOSE   Result Value Ref Range    POCT Glucose 81 74 - 99 mg/dL         IMAGING RESULTS:  XR abdomen 1 view   Final Result   1. Paucity of bowel-gas within the central abdomen with otherwise   nonobstructive pattern.   2. No acute cardiopulmonary abnormality.   3. Medical devices as described above.        I personally reviewed the image(s)/study and resident interpretation   as stated by Dr. Terri Delacruz MD. I agree with the findings as   stated. This study was interpreted at [place], [place].        MACRO:   None        Signed by: Yovanny Hook 9/21/2024 1:08 PM   Dictation workstation:   [id number]      XR chest 1 view   Final Result   1. Paucity of bowel-gas within the central abdomen with otherwise   nonobstructive pattern.   2. No acute cardiopulmonary abnormality.   3. Medical devices as described above.        I personally reviewed the image(s)/study and resident interpretation   as stated by Dr. Terri Delacruz MD. I agree with the findings as   stated. This study was interpreted at [place], [place].        MACRO:   None        Signed by: Yovanny Hook 9/21/2024 1:08 PM   Dictation workstation:   [id number]            Assessment/Plan    NEURO:  #Status epilepticus s/p 2 keppra load  - CTH: no acute intracranial abnormality  Assessment:  - See above  Plan:  - NSU  - Neuro Checks: Q1H  - Sedation:  Propofol, continue until negative EEG  - Pain: acetaminophen PRN  - UDS positive for cannabinoids, fent & benzo  - CIWA protocol (last drink 9/20)  - C/w Keppra 500mg BID   - ZNS increased to 400mg at bedtime   - PT/OT/SLP    CARDIOVASCULAR:  #Afib  #HFrEF  #Hypotension iso Propofol  Plan:  - Continue to monitor on telemetry  - BP goal: Normotension  - Continue home eliquis  - Hold home coreg given soft BP, resume if BP normalizes   - TTE ordered     RESPIRATORY:  #Hypoxic respiratory failure s/p intubation  CXR with no c/f pneumonia, fluid overload  Plan:  - Continuous pulse oximetry   - O2 PRN to maintain SpO2 > 94%, wean as tolerated  - After status epilepticus has been ruled out or sufficiently treated, can wean to extubate   - ETT 5.7cm from tigre will repeat CXR on arrival    RENAL/:  #MAYTE  Assessment:  - Baseline BUN/Cr: 18/1.2  Results from last 72 hours   Lab Units 09/22/24  0109 09/21/24  1131   BUN mg/dL 14 15   CREATININE mg/dL 1.34* 1.18       Plan:  - Monitor with daily RFP    FEN/GI:  #MAYTE  Assessment:  -  Last BM PTA  Plan:  - Monitor and replace electrolytes per protocol  - IVF: NS @ 75 mL/hr  - Diet: NPO   - Bowel Regimen: Miralax QD    ENDOCRINE:  #MAYTE  Assessment:  Results from last 7 days   Lab Units 09/22/24  0710 09/22/24  0433 09/22/24  0109 09/21/24  2332 09/21/24  2019 09/21/24  1647 09/21/24  1207 09/21/24  1131   POCT GLUCOSE mg/dL 81 82  --  105* 91 93 89  --    GLUCOSE mg/dL  --   --  115*  --   --   --   --  86      Plan:  - Accuchecks & ISS Q6H     HEMATOLOGY:  #MAYTE  Assessment:  - Baseline Hgb: 15  - Baseline Plts: 130  Results from last 7 days   Lab Units 09/22/24  0109 09/21/24  0948   HEMOGLOBIN g/dL 12.9* 14.9   HEMATOCRIT % 37.0* 43.9   PLATELETS AUTO x10*3/uL 109* 83*     Plan:  - Continue to monitor with daily CBC and Coag panel    INFECTIOUS DISEASE:  #c/f sepsis  Assessment:  Results from last 7 days   Lab Units 09/22/24  0109 09/21/24  0948   WBC AUTO x10*3/uL 8.3 11.8*     - Temp (24hrs), Av.2 °C (97.1 °F), Min:36 °C (96.8 °F), Max:36.3 °C (97.3 °F)   - MRSA negative  - UA with 500 leuk esterase, Nitrite negative, >50wbc  Plan:  - Continue to monitor for s/sx of infection  - Pan culture for temperature > 38.4 C  - Continue Zosyn pending infectious work up  - Follow up blood cultures ()  - Discontinue Vanc     MUSCULOSKELETAL:  - No acute issues    SKIN:  - No acute issues  - Turns and skin care per NSU protocol    ACCESS:  - PIV    PROPHYLAXIS:  - DVT Ppx: Eliquis  - GI Ppx: Pantoprazole    RESTRAINTS:  I agree with nursing assessment of the patient's need for restraints to protect the patient from injury and facilitate healing. The patient is unable to cooperate with the plan of care and at risk for disrupting critical therapy (i.e., removing medical devices, lines, tubes and/or dressings).  Please see order for specifics. Restraints can be removed when the patient is able to cooperate with plan of care and allow healing to occur, or the medical devices at risk are discontinued by the medical team.     Gary Huber DO  Neuroscience Intensive Care       Plan of care to be discussed with neurocritical care attending

## 2024-09-23 ENCOUNTER — APPOINTMENT (OUTPATIENT)
Dept: RADIOLOGY | Facility: HOSPITAL | Age: 74
DRG: 100 | End: 2024-09-23
Payer: MEDICARE

## 2024-09-23 ENCOUNTER — APPOINTMENT (OUTPATIENT)
Dept: CARDIOLOGY | Facility: HOSPITAL | Age: 74
DRG: 100 | End: 2024-09-23
Payer: MEDICARE

## 2024-09-23 VITALS
OXYGEN SATURATION: 100 % | SYSTOLIC BLOOD PRESSURE: 95 MMHG | HEART RATE: 81 BPM | TEMPERATURE: 98.1 F | WEIGHT: 145.94 LBS | RESPIRATION RATE: 16 BRPM | DIASTOLIC BLOOD PRESSURE: 66 MMHG | BODY MASS INDEX: 20.94 KG/M2

## 2024-09-23 LAB
ALBUMIN SERPL BCP-MCNC: 2.4 G/DL (ref 3.4–5)
ALBUMIN SERPL BCP-MCNC: 2.7 G/DL (ref 3.4–5)
ANION GAP SERPL CALC-SCNC: 11 MMOL/L (ref 10–20)
ANION GAP SERPL CALC-SCNC: 12 MMOL/L (ref 10–20)
AORTIC VALVE PEAK VELOCITY: 1.13 M/S
APTT PPP: 36 SECONDS (ref 27–38)
APTT PPP: 38 SECONDS (ref 27–38)
AV PEAK GRADIENT: 5.1 MMHG
AVA (PEAK VEL): 2.32 CM2
BASOPHILS # BLD AUTO: 0.03 X10*3/UL (ref 0–0.1)
BASOPHILS NFR BLD AUTO: 0.3 %
BUN SERPL-MCNC: 10 MG/DL (ref 6–23)
BUN SERPL-MCNC: 11 MG/DL (ref 6–23)
CA-I BLD-SCNC: 1.02 MMOL/L (ref 1.1–1.33)
CALCIUM SERPL-MCNC: 6.6 MG/DL (ref 8.6–10.6)
CALCIUM SERPL-MCNC: 7.8 MG/DL (ref 8.6–10.6)
CHLORIDE SERPL-SCNC: 110 MMOL/L (ref 98–107)
CHLORIDE SERPL-SCNC: 116 MMOL/L (ref 98–107)
CO2 SERPL-SCNC: 19 MMOL/L (ref 21–32)
CO2 SERPL-SCNC: 20 MMOL/L (ref 21–32)
CREAT SERPL-MCNC: 0.95 MG/DL (ref 0.5–1.3)
CREAT SERPL-MCNC: 1.07 MG/DL (ref 0.5–1.3)
EGFRCR SERPLBLD CKD-EPI 2021: 74 ML/MIN/1.73M*2
EGFRCR SERPLBLD CKD-EPI 2021: 85 ML/MIN/1.73M*2
EJECTION FRACTION APICAL 4 CHAMBER: 47
EJECTION FRACTION: 53 %
EOSINOPHIL # BLD AUTO: 0.03 X10*3/UL (ref 0–0.4)
EOSINOPHIL NFR BLD AUTO: 0.3 %
ERYTHROCYTE [DISTWIDTH] IN BLOOD BY AUTOMATED COUNT: 13.7 % (ref 11.5–14.5)
GLUCOSE BLD MANUAL STRIP-MCNC: 122 MG/DL (ref 74–99)
GLUCOSE BLD MANUAL STRIP-MCNC: 80 MG/DL (ref 74–99)
GLUCOSE BLD MANUAL STRIP-MCNC: 82 MG/DL (ref 74–99)
GLUCOSE SERPL-MCNC: 76 MG/DL (ref 74–99)
GLUCOSE SERPL-MCNC: 83 MG/DL (ref 74–99)
HCT VFR BLD AUTO: 34.9 % (ref 41–52)
HGB BLD-MCNC: 12 G/DL (ref 13.5–17.5)
IMM GRANULOCYTES # BLD AUTO: 0.04 X10*3/UL (ref 0–0.5)
IMM GRANULOCYTES NFR BLD AUTO: 0.5 % (ref 0–0.9)
INR PPP: 1.7 (ref 0.9–1.1)
INR PPP: 1.7 (ref 0.9–1.1)
LEFT ATRIUM VOLUME AREA LENGTH INDEX BSA: 55 ML/M2
LEFT VENTRICLE INTERNAL DIMENSION DIASTOLE: 4.8 CM (ref 3.5–6)
LEFT VENTRICULAR OUTFLOW TRACT DIAMETER: 2 CM
LYMPHOCYTES # BLD AUTO: 1.39 X10*3/UL (ref 0.8–3)
LYMPHOCYTES NFR BLD AUTO: 16.1 %
MAGNESIUM SERPL-MCNC: 1.49 MG/DL (ref 1.6–2.4)
MAGNESIUM SERPL-MCNC: 2.93 MG/DL (ref 1.6–2.4)
MCH RBC QN AUTO: 31.7 PG (ref 26–34)
MCHC RBC AUTO-ENTMCNC: 34.4 G/DL (ref 32–36)
MCV RBC AUTO: 92 FL (ref 80–100)
MONOCYTES # BLD AUTO: 0.77 X10*3/UL (ref 0.05–0.8)
MONOCYTES NFR BLD AUTO: 8.9 %
NEUTROPHILS # BLD AUTO: 6.39 X10*3/UL (ref 1.6–5.5)
NEUTROPHILS NFR BLD AUTO: 73.9 %
NRBC BLD-RTO: 0 /100 WBCS (ref 0–0)
PHOSPHATE SERPL-MCNC: 2.9 MG/DL (ref 2.5–4.9)
PHOSPHATE SERPL-MCNC: 3 MG/DL (ref 2.5–4.9)
PLATELET # BLD AUTO: 96 X10*3/UL (ref 150–450)
POTASSIUM SERPL-SCNC: 2.8 MMOL/L (ref 3.5–5.3)
POTASSIUM SERPL-SCNC: 4.6 MMOL/L (ref 3.5–5.3)
PROTHROMBIN TIME: 19.2 SECONDS (ref 9.8–12.8)
PROTHROMBIN TIME: 19.7 SECONDS (ref 9.8–12.8)
RBC # BLD AUTO: 3.78 X10*6/UL (ref 4.5–5.9)
RIGHT VENTRICLE FREE WALL PEAK S': 10 CM/S
RIGHT VENTRICLE PEAK SYSTOLIC PRESSURE: 40 MMHG
SODIUM SERPL-SCNC: 137 MMOL/L (ref 136–145)
SODIUM SERPL-SCNC: 143 MMOL/L (ref 136–145)
TRICUSPID ANNULAR PLANE SYSTOLIC EXCURSION: 1.7 CM
WBC # BLD AUTO: 8.7 X10*3/UL (ref 4.4–11.3)

## 2024-09-23 PROCEDURE — 2500000001 HC RX 250 WO HCPCS SELF ADMINISTERED DRUGS (ALT 637 FOR MEDICARE OP): Performed by: REGISTERED NURSE

## 2024-09-23 PROCEDURE — 2500000004 HC RX 250 GENERAL PHARMACY W/ HCPCS (ALT 636 FOR OP/ED)

## 2024-09-23 PROCEDURE — 82947 ASSAY GLUCOSE BLOOD QUANT: CPT

## 2024-09-23 PROCEDURE — 85025 COMPLETE CBC W/AUTO DIFF WBC: CPT

## 2024-09-23 PROCEDURE — 93306 TTE W/DOPPLER COMPLETE: CPT | Performed by: INTERNAL MEDICINE

## 2024-09-23 PROCEDURE — 95720 EEG PHY/QHP EA INCR W/VEEG: CPT | Performed by: STUDENT IN AN ORGANIZED HEALTH CARE EDUCATION/TRAINING PROGRAM

## 2024-09-23 PROCEDURE — 80069 RENAL FUNCTION PANEL: CPT

## 2024-09-23 PROCEDURE — 74018 RADEX ABDOMEN 1 VIEW: CPT

## 2024-09-23 PROCEDURE — 99232 SBSQ HOSP IP/OBS MODERATE 35: CPT

## 2024-09-23 PROCEDURE — 74018 RADEX ABDOMEN 1 VIEW: CPT | Performed by: RADIOLOGY

## 2024-09-23 PROCEDURE — 36415 COLL VENOUS BLD VENIPUNCTURE: CPT

## 2024-09-23 PROCEDURE — 93306 TTE W/DOPPLER COMPLETE: CPT

## 2024-09-23 PROCEDURE — 82330 ASSAY OF CALCIUM: CPT

## 2024-09-23 PROCEDURE — 85610 PROTHROMBIN TIME: CPT

## 2024-09-23 PROCEDURE — 2500000001 HC RX 250 WO HCPCS SELF ADMINISTERED DRUGS (ALT 637 FOR MEDICARE OP)

## 2024-09-23 PROCEDURE — 94003 VENT MGMT INPAT SUBQ DAY: CPT

## 2024-09-23 PROCEDURE — 83735 ASSAY OF MAGNESIUM: CPT

## 2024-09-23 PROCEDURE — 2500000005 HC RX 250 GENERAL PHARMACY W/O HCPCS: Performed by: PSYCHIATRY & NEUROLOGY

## 2024-09-23 PROCEDURE — 99291 CRITICAL CARE FIRST HOUR: CPT | Performed by: PSYCHIATRY & NEUROLOGY

## 2024-09-23 PROCEDURE — 95716 VEEG EA 12-26HR CONT MNTR: CPT

## 2024-09-23 PROCEDURE — 2500000004 HC RX 250 GENERAL PHARMACY W/ HCPCS (ALT 636 FOR OP/ED): Performed by: STUDENT IN AN ORGANIZED HEALTH CARE EDUCATION/TRAINING PROGRAM

## 2024-09-23 PROCEDURE — 2500000004 HC RX 250 GENERAL PHARMACY W/ HCPCS (ALT 636 FOR OP/ED): Performed by: REGISTERED NURSE

## 2024-09-23 PROCEDURE — 2020000001 HC ICU ROOM DAILY

## 2024-09-23 RX ORDER — MAGNESIUM SULFATE HEPTAHYDRATE 40 MG/ML
2 INJECTION, SOLUTION INTRAVENOUS ONCE
Status: COMPLETED | OUTPATIENT
Start: 2024-09-23 | End: 2024-09-23

## 2024-09-23 RX ORDER — POTASSIUM CHLORIDE 1.5 G/1.58G
40 POWDER, FOR SOLUTION ORAL ONCE
Status: COMPLETED | OUTPATIENT
Start: 2024-09-23 | End: 2024-09-23

## 2024-09-23 RX ORDER — FENTANYL CITRATE-0.9 % NACL/PF 10 MCG/ML
25-200 PLASTIC BAG, INJECTION (ML) INTRAVENOUS CONTINUOUS
Status: DISCONTINUED | OUTPATIENT
Start: 2024-09-23 | End: 2024-09-23

## 2024-09-23 RX ORDER — FENTANYL CITRATE-0.9 % NACL/PF 10 MCG/ML
PLASTIC BAG, INJECTION (ML) INTRAVENOUS
Status: COMPLETED
Start: 2024-09-23 | End: 2024-09-23

## 2024-09-23 RX ORDER — QUETIAPINE FUMARATE 25 MG/1
50 TABLET, FILM COATED ORAL NIGHTLY
Status: DISCONTINUED | OUTPATIENT
Start: 2024-09-23 | End: 2024-09-24

## 2024-09-23 RX ORDER — TALC
6 POWDER (GRAM) TOPICAL DAILY
Status: DISCONTINUED | OUTPATIENT
Start: 2024-09-23 | End: 2024-09-24

## 2024-09-23 RX ORDER — CALCIUM GLUCONATE 20 MG/ML
2 INJECTION, SOLUTION INTRAVENOUS ONCE
Status: COMPLETED | OUTPATIENT
Start: 2024-09-23 | End: 2024-09-23

## 2024-09-23 RX ORDER — LORAZEPAM 2 MG/ML
1 INJECTION INTRAMUSCULAR ONCE
Status: COMPLETED | OUTPATIENT
Start: 2024-09-23 | End: 2024-09-23

## 2024-09-23 RX ORDER — SODIUM CHLORIDE, SODIUM LACTATE, POTASSIUM CHLORIDE, CALCIUM CHLORIDE 600; 310; 30; 20 MG/100ML; MG/100ML; MG/100ML; MG/100ML
75 INJECTION, SOLUTION INTRAVENOUS CONTINUOUS
Status: DISCONTINUED | OUTPATIENT
Start: 2024-09-23 | End: 2024-09-26

## 2024-09-23 RX ORDER — POTASSIUM CHLORIDE 14.9 MG/ML
20 INJECTION INTRAVENOUS ONCE
Status: COMPLETED | OUTPATIENT
Start: 2024-09-23 | End: 2024-09-23

## 2024-09-23 RX ORDER — DEXMEDETOMIDINE HYDROCHLORIDE 4 UG/ML
.1-1.5 INJECTION, SOLUTION INTRAVENOUS CONTINUOUS
Status: DISCONTINUED | OUTPATIENT
Start: 2024-09-23 | End: 2024-09-25

## 2024-09-23 RX ADMIN — Medication 40 L/MIN: at 08:14

## 2024-09-23 RX ADMIN — PERFLUTREN 2 ML OF DILUTION: 6.52 INJECTION, SUSPENSION INTRAVENOUS at 11:48

## 2024-09-23 RX ADMIN — PROPOFOL 45 MCG/KG/MIN: 10 INJECTION, EMULSION INTRAVENOUS at 00:30

## 2024-09-23 RX ADMIN — FENTANYL CITRATE 200 MCG/HR: 0.05 INJECTION, SOLUTION INTRAMUSCULAR; INTRAVENOUS at 02:00

## 2024-09-23 RX ADMIN — FOLIC ACID 1 MG: 1 TABLET ORAL at 08:02

## 2024-09-23 RX ADMIN — THIAMINE HYDROCHLORIDE 500 MG: 100 INJECTION, SOLUTION INTRAMUSCULAR; INTRAVENOUS at 23:00

## 2024-09-23 RX ADMIN — MAGNESIUM SULFATE HEPTAHYDRATE 2 G: 40 INJECTION, SOLUTION INTRAVENOUS at 08:03

## 2024-09-23 RX ADMIN — LORAZEPAM 1 MG: 2 INJECTION, SOLUTION INTRAMUSCULAR; INTRAVENOUS at 12:49

## 2024-09-23 RX ADMIN — POTASSIUM CHLORIDE 40 MEQ: 1.5 POWDER, FOR SOLUTION ORAL at 08:02

## 2024-09-23 RX ADMIN — PROPOFOL 40 MCG/KG/MIN: 10 INJECTION, EMULSION INTRAVENOUS at 06:54

## 2024-09-23 RX ADMIN — POTASSIUM CHLORIDE 40 MEQ: 1.5 POWDER, FOR SOLUTION ORAL at 04:11

## 2024-09-23 RX ADMIN — DEXMEDETOMIDINE HYDROCHLORIDE 1.5 MCG/KG/HR: 400 INJECTION INTRAVENOUS at 16:33

## 2024-09-23 RX ADMIN — PYRIDOXINE HCL TAB 50 MG 50 MG: 50 TAB at 10:09

## 2024-09-23 RX ADMIN — POTASSIUM CHLORIDE 20 MEQ: 14.9 INJECTION, SOLUTION INTRAVENOUS at 04:12

## 2024-09-23 RX ADMIN — CALCIUM GLUCONATE 2 G: 20 INJECTION, SOLUTION INTRAVENOUS at 04:12

## 2024-09-23 RX ADMIN — POTASSIUM CHLORIDE 20 MEQ: 14.9 INJECTION, SOLUTION INTRAVENOUS at 08:02

## 2024-09-23 RX ADMIN — DEXMEDETOMIDINE HYDROCHLORIDE 1.3 MCG/KG/HR: 400 INJECTION INTRAVENOUS at 22:07

## 2024-09-23 RX ADMIN — Medication 1 TABLET: at 08:02

## 2024-09-23 RX ADMIN — THIAMINE HYDROCHLORIDE 500 MG: 100 INJECTION, SOLUTION INTRAMUSCULAR; INTRAVENOUS at 05:43

## 2024-09-23 RX ADMIN — PIPERACILLIN SODIUM AND TAZOBACTAM SODIUM 3.38 G: 3; .375 INJECTION, SOLUTION INTRAVENOUS at 10:09

## 2024-09-23 RX ADMIN — LORAZEPAM 1 MG: 2 INJECTION, SOLUTION INTRAMUSCULAR; INTRAVENOUS at 17:53

## 2024-09-23 RX ADMIN — FENTANYL CITRATE 200 MCG/HR: 0.05 INJECTION, SOLUTION INTRAMUSCULAR; INTRAVENOUS at 06:10

## 2024-09-23 RX ADMIN — LEVETIRACETAM 500 MG: 5 INJECTION INTRAVENOUS at 22:10

## 2024-09-23 RX ADMIN — LEVETIRACETAM 500 MG: 5 INJECTION INTRAVENOUS at 10:09

## 2024-09-23 RX ADMIN — POLYETHYLENE GLYCOL 3350 17 G: 17 POWDER, FOR SOLUTION ORAL at 08:02

## 2024-09-23 RX ADMIN — PIPERACILLIN SODIUM AND TAZOBACTAM SODIUM 3.38 G: 3; .375 INJECTION, SOLUTION INTRAVENOUS at 04:11

## 2024-09-23 RX ADMIN — APIXABAN 5 MG: 5 TABLET, FILM COATED ORAL at 08:02

## 2024-09-23 RX ADMIN — MAGNESIUM SULFATE HEPTAHYDRATE 4 G: 40 INJECTION, SOLUTION INTRAVENOUS at 03:13

## 2024-09-23 RX ADMIN — DEXMEDETOMIDINE HYDROCHLORIDE 0.4 MCG/KG/HR: 400 INJECTION INTRAVENOUS at 12:03

## 2024-09-23 RX ADMIN — THIAMINE HYDROCHLORIDE 500 MG: 100 INJECTION, SOLUTION INTRAMUSCULAR; INTRAVENOUS at 16:55

## 2024-09-23 RX ADMIN — ZONISAMIDE 400 MG: 100 SUSPENSION ORAL at 21:52

## 2024-09-23 RX ADMIN — SODIUM CHLORIDE, POTASSIUM CHLORIDE, SODIUM LACTATE AND CALCIUM CHLORIDE 75 ML/HR: 600; 310; 30; 20 INJECTION, SOLUTION INTRAVENOUS at 09:00

## 2024-09-23 ASSESSMENT — LIFESTYLE VARIABLES
TOTAL SCORE: 14
ANXIETY: NO ANXIETY, AT EASE
VISUAL DISTURBANCES: NOT PRESENT
PAROXYSMAL SWEATS: NO SWEAT VISIBLE
HEADACHE, FULLNESS IN HEAD: NOT PRESENT
AGITATION: SOMEWHAT MORE THAN NORMAL ACTIVITY
AGITATION: 6
NAUSEA AND VOMITING: NO NAUSEA AND NO VOMITING
AUDITORY DISTURBANCES: NOT PRESENT
AUDITORY DISTURBANCES: NOT PRESENT
HEADACHE, FULLNESS IN HEAD: NOT PRESENT
ORIENTATION AND CLOUDING OF SENSORIUM: ORIENTED AND CAN DO SERIAL ADDITIONS
ORIENTATION AND CLOUDING OF SENSORIUM: ORIENTED AND CAN DO SERIAL ADDITIONS
PAROXYSMAL SWEATS: NO SWEAT VISIBLE
HEADACHE, FULLNESS IN HEAD: NOT PRESENT
TREMOR: NO TREMOR
AGITATION: 3
ORIENTATION AND CLOUDING OF SENSORIUM: DISORIENTED FOR DATE BY MORE THAN 2 CALENDAR DAYS
ORIENTATION AND CLOUDING OF SENSORIUM: ORIENTED AND CAN DO SERIAL ADDITIONS
PAROXYSMAL SWEATS: NO SWEAT VISIBLE
NAUSEA AND VOMITING: NO NAUSEA AND NO VOMITING
PAROXYSMAL SWEATS: NO SWEAT VISIBLE
AUDITORY DISTURBANCES: NOT PRESENT
VISUAL DISTURBANCES: NOT PRESENT
PAROXYSMAL SWEATS: NO SWEAT VISIBLE
PAROXYSMAL SWEATS: NO SWEAT VISIBLE
TREMOR: NO TREMOR
TOTAL SCORE: 0
HEADACHE, FULLNESS IN HEAD: NOT PRESENT
AGITATION: NORMAL ACTIVITY
AGITATION: NORMAL ACTIVITY
VISUAL DISTURBANCES: NOT PRESENT
VISUAL DISTURBANCES: NOT PRESENT
AUDITORY DISTURBANCES: NOT PRESENT
VISUAL DISTURBANCES: NOT PRESENT
TOTAL SCORE: 1
ANXIETY: 5
ANXIETY: MILDLY ANXIOUS
VISUAL DISTURBANCES: NOT PRESENT
HEADACHE, FULLNESS IN HEAD: NOT PRESENT
ORIENTATION AND CLOUDING OF SENSORIUM: DISORIENTED FOR PLACE OR PERSON
AGITATION: NORMAL ACTIVITY
PAROXYSMAL SWEATS: NO SWEAT VISIBLE
HEADACHE, FULLNESS IN HEAD: NOT PRESENT
AGITATION: NORMAL ACTIVITY
TOTAL SCORE: 0
TREMOR: NO TREMOR
ANXIETY: NO ANXIETY, AT EASE
ANXIETY: 6
NAUSEA AND VOMITING: NO NAUSEA AND NO VOMITING
VISUAL DISTURBANCES: NOT PRESENT
AUDITORY DISTURBANCES: NOT PRESENT
ANXIETY: 3
AGITATION: 6
ORIENTATION AND CLOUDING OF SENSORIUM: ORIENTED AND CAN DO SERIAL ADDITIONS
TOTAL SCORE: 0
TOTAL SCORE: 2
TOTAL SCORE: 15
TOTAL SCORE: 6
PAROXYSMAL SWEATS: NO SWEAT VISIBLE
HEADACHE, FULLNESS IN HEAD: NOT PRESENT
NAUSEA AND VOMITING: NO NAUSEA AND NO VOMITING
TREMOR: NO TREMOR
TOTAL SCORE: 0
ANXIETY: NO ANXIETY, AT EASE
AGITATION: SOMEWHAT MORE THAN NORMAL ACTIVITY
TREMOR: NO TREMOR
VISUAL DISTURBANCES: NOT PRESENT
NAUSEA AND VOMITING: NO NAUSEA AND NO VOMITING
TREMOR: NO TREMOR
ORIENTATION AND CLOUDING OF SENSORIUM: ORIENTED AND CAN DO SERIAL ADDITIONS
HEADACHE, FULLNESS IN HEAD: NOT PRESENT
TREMOR: NO TREMOR
ANXIETY: NO ANXIETY, AT EASE
AUDITORY DISTURBANCES: NOT PRESENT
TREMOR: NO TREMOR
ORIENTATION AND CLOUDING OF SENSORIUM: DISORIENTED FOR PLACE OR PERSON
AUDITORY DISTURBANCES: NOT PRESENT
AUDITORY DISTURBANCES: NOT PRESENT
PAROXYSMAL SWEATS: NO SWEAT VISIBLE
TREMOR: NO TREMOR
ORIENTATION AND CLOUDING OF SENSORIUM: ORIENTED AND CAN DO SERIAL ADDITIONS
ORIENTATION AND CLOUDING OF SENSORIUM: ORIENTED AND CAN DO SERIAL ADDITIONS
PAROXYSMAL SWEATS: NO SWEAT VISIBLE
NAUSEA AND VOMITING: NO NAUSEA AND NO VOMITING
ANXIETY: NO ANXIETY, AT EASE
TREMOR: NO TREMOR
PULSE: 96
AUDITORY DISTURBANCES: NOT PRESENT
AGITATION: 6
AUDITORY DISTURBANCES: NOT PRESENT
NAUSEA AND VOMITING: NO NAUSEA AND NO VOMITING
HEADACHE, FULLNESS IN HEAD: NOT PRESENT
VISUAL DISTURBANCES: NOT PRESENT
NAUSEA AND VOMITING: NO NAUSEA AND NO VOMITING
VISUAL DISTURBANCES: NOT PRESENT
NAUSEA AND VOMITING: NO NAUSEA AND NO VOMITING
NAUSEA AND VOMITING: NO NAUSEA AND NO VOMITING
ANXIETY: 5
HEADACHE, FULLNESS IN HEAD: NOT PRESENT
TOTAL SCORE: 16

## 2024-09-23 ASSESSMENT — COGNITIVE AND FUNCTIONAL STATUS - GENERAL
PERSONAL GROOMING: A LOT
MOBILITY SCORE: 12
MOVING TO AND FROM BED TO CHAIR: A LOT
WALKING IN HOSPITAL ROOM: A LOT
EATING MEALS: A LOT
STANDING UP FROM CHAIR USING ARMS: A LOT
MOVING FROM LYING ON BACK TO SITTING ON SIDE OF FLAT BED WITH BEDRAILS: A LOT
TURNING FROM BACK TO SIDE WHILE IN FLAT BAD: A LOT
DAILY ACTIVITIY SCORE: 12
DRESSING REGULAR LOWER BODY CLOTHING: A LOT
TOILETING: A LOT
CLIMB 3 TO 5 STEPS WITH RAILING: A LOT
HELP NEEDED FOR BATHING: A LOT
DRESSING REGULAR UPPER BODY CLOTHING: A LOT

## 2024-09-23 ASSESSMENT — PAIN SCALES - GENERAL: PAINLEVEL_OUTOF10: 0 - NO PAIN

## 2024-09-23 ASSESSMENT — PAIN - FUNCTIONAL ASSESSMENT
PAIN_FUNCTIONAL_ASSESSMENT: 0-10
PAIN_FUNCTIONAL_ASSESSMENT: CPOT (CRITICAL CARE PAIN OBSERVATION TOOL)
PAIN_FUNCTIONAL_ASSESSMENT: CPOT (CRITICAL CARE PAIN OBSERVATION TOOL)

## 2024-09-23 NOTE — CARE PLAN
The patient's goals for the shift include    Problem: Skin  Goal: Prevent/manage excess moisture  Outcome: Progressing  Flowsheets (Taken 9/23/2024 1111)  Prevent/manage excess moisture: Cleanse incontinence/protect with barrier cream     Problem: Skin  Goal: Promote/optimize nutrition  Outcome: Progressing  Flowsheets (Taken 9/23/2024 1111)  Promote/optimize nutrition: Discuss with provider if NPO > 2 days     Problem: Skin  Goal: Promote skin healing  Outcome: Progressing  Flowsheets (Taken 9/23/2024 1111)  Promote skin healing:   Turn/reposition every 2 hours/use positioning/transfer devices   Protective dressings over bony prominences     Problem: Safety - Adult  Goal: Free from fall injury  Outcome: Progressing  Flowsheets (Taken 9/23/2024 1111)  Free from fall injury: Instruct family/caregiver on patient safety     Problem: Safety - Medical Restraint  Goal: Free from restraint(s) (Restraint for Interference with Medical Device)  Outcome: Progressing  Flowsheets (Taken 9/23/2024 1111)  Free from restraint(s) (restraint for interference with medical device): Identify and implement measures to help patient regain control       The clinical goals for the shift include patient will maintain a stable neurological status and remain safe with no falls throughout the shift. The patient will remain hemodynamically stable with a MAP greater than 65 and maintain respiratory status with support of the ventilator.     Over the shift, the patient did not make progress toward the following goals. Barriers to progression include the patient remains intubated and sedated on the ventilator. Patient continues to have extreme agitation with SAT . Recommendations to address these barriers include continue to optimize hemodynamic and respiratory status with eventual goal of extubation.     Problem: Skin  Goal: Participates in plan/prevention/treatment measures  Outcome: Not Progressing  Flowsheets (Taken 9/23/2024  1111)  Participates in plan/prevention/treatment measures: Elevate heels     Problem: Pain - Adult  Goal: Verbalizes/displays adequate comfort level or baseline comfort level  Outcome: Not Progressing

## 2024-09-23 NOTE — ASSESSMENT & PLAN NOTE
Paddy Solis is a 73 y.o. 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 Admitted to NSU on 9/21/2024 for close monitoring with EEG and optimize AED regimen as needed.

## 2024-09-23 NOTE — CARE PLAN
Problem: Skin  Goal: Decreased wound size/increased tissue granulation at next dressing change  Outcome: Progressing  Goal: Participates in plan/prevention/treatment measures  Outcome: Progressing  Goal: Prevent/manage excess moisture  Outcome: Progressing  Goal: Prevent/minimize sheer/friction injuries  Outcome: Progressing  Goal: Promote/optimize nutrition  Outcome: Progressing  Goal: Promote skin healing  Outcome: Progressing     Problem: Pain - Adult  Goal: Verbalizes/displays adequate comfort level or baseline comfort level  Outcome: Progressing     Problem: Safety - Adult  Goal: Free from fall injury  Outcome: Progressing     Problem: Discharge Planning  Goal: Discharge to home or other facility with appropriate resources  Outcome: Progressing     Problem: Chronic Conditions and Co-morbidities  Goal: Patient's chronic conditions and co-morbidity symptoms are monitored and maintained or improved  Outcome: Progressing     Problem: Safety - Medical Restraint  Goal: Remains free of injury from restraints (Restraint for Interference with Medical Device)  Outcome: Progressing  Goal: Free from restraint(s) (Restraint for Interference with Medical Device)  Outcome: Progressing

## 2024-09-23 NOTE — PROGRESS NOTES
Communication Note    Patient Name: Paddy Solis  MRN: [id number]  Today's Date: 9/23/2024   Room: 14/14A    Discipline: Occupational Therapy      Missed Visit: Yes  Missed Visit Reason: Patient placed on medical hold      09/23/24 at 8:48 AM   Stormy Feliz OT   Rehab Office: [phone]

## 2024-09-23 NOTE — PROGRESS NOTES
[facility]  NEUROSCIENCE INTENSIVE CARE UNIT  DAILY PROGRESS NOTE       Patient Name: Paddy Solis   MRN: [id number]     Admit Date: 2024     : 1950 AGE: 74 y.o. GENDER: male        Subjective    74 y.o. 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  Admitted 2024 with Status epilepticus (Multi) after presenting  to [facility] as a transfer from [place] with c/f status epilepticus. Admitted to NSU for close monitoring with EEG and optimize AED regimen as needed. Patient arrived intubated and sedated on propofol. Loaded with 2g of Keppra PTA. CTH obtained was unremarkable.    Significant Events:  - Admitted to NSU  - On arrival there was concern patient was having an active seizure with generlized shaking with eyes rolled back & eyes deviated to the right. Resolved on arrival to evaluate. Event lasted >1 minute. Was bolused with Prop and rate increased to 50mcg/min    Interval Events:   No acute events overnight. Intermittently agitated so sedation at fentanyl 200 and prop 40. Minimal urine output 15-30cc/h overnight.   Preliminary EEG with bitemporal ( R>L) lobe epileptogenicity but no seizures.        Objective   VITALS (24H):  Temp:  [36.1 °C (97 °F)-37.2 °C (99 °F)] 36.4 °C (97.5 °F)  Heart Rate:  [] 77  Resp:  [15-16] 16  BP: ()/(53-70) 90/58  FiO2 (%):  [40 %] 40 %  INTAKE/OUTPUT:  Intake/Output Summary (Last 24 hours) at 2024 0747  Last data filed at 2024 0700  Gross per 24 hour   Intake 2760.02 ml   Output 670 ml   Net 2090.02 ml     VENT SETTINGS:  Vent Mode: Volume control/assist control  FiO2 (%):  [40 %] 40 %  S RR:  [16] 16  S VT:  [450 mL] 450 mL  PEEP/CPAP (cm H2O):  [5 cm H20] 5 cm H20  MAP (cm H2O):  [8-10] 8.4     PHYSICAL EXAM:  NEURO:  - Intubated/Sedated on 40mcg Prop and 200 fent - examined on sedation  - EOS, PERRL, midline gaze, tracking across the room. Following  commands. KEARNS spontaneously, 4/5 hand  bilaterally, wiggles toes bilaterally    CV:  - RRR on telemetry, NSR  RESP:  - Intubated  - PC/AC. FiO2 40% PEEP 5 Vt 450 RR 16 satting 100%   :  - indwelling catheter in place  GI:  - Abdomen NT/ND, soft  SKIN:  - Intact    MEDICATIONS:  Scheduled: PRN: Continuous:   apixaban, 5 mg, oral, BID  [Held by provider] carvedilol, 25 mg, oral, BID  folic acid, 1 mg, oral, Daily  insulin lispro, 0-5 Units, subcutaneous, TID  levETIRAcetam, 500 mg, intravenous, q12h  multivitamin with minerals, 1 tablet, oral, Daily  oxygen, , inhalation, Continuous - Inhalation  piperacillin-tazobactam, 3.375 g, intravenous, q6h  polyethylene glycol, 17 g, oral, Daily  pyridoxine, 50 mg, oral, Daily  [START ON 9/24/2024] thiamine, 100 mg, oral, Daily  thiamine, 500 mg, intravenous, q8h PIPO  zonisamide, 400 mg, orogastric tube, Nightly     PRN medications: acetaminophen, dextrose, dextrose, glucagon, glucagon, LORazepam **OR** LORazepam **OR** LORazepam, magnesium sulfate, magnesium sulfate, potassium chloride CR **OR** potassium chloride, potassium chloride CR **OR** potassium chloride, potassium chloride fentaNYL,  mcg/hr, Last Rate: 200 mcg/hr (09/23/24 0610)  fentaNYL,  mcg/hr  propofol, 0-50 mcg/kg/min, Last Rate: 40 mcg/kg/min (09/23/24 0654)  sodium chloride 0.9%, 75 mL/hr, Last Rate: 75 mL/hr (09/23/24 0600)         LAB RESULTS:  Results from last 72 hours   Lab Units 09/23/24  0029 09/22/24  0109 09/21/24  0948   WBC AUTO x10*3/uL 8.7 8.3 11.8*   HEMOGLOBIN g/dL 12.0* 12.9* 14.9   HEMATOCRIT % 34.9* 37.0* 43.9   PLATELETS AUTO x10*3/uL 96* 109* 83*        Results from last 72 hours   Lab Units 09/23/24  0029 09/22/24  0109 09/21/24  1131   SODIUM mmol/L 143 140 136   POTASSIUM mmol/L 2.8* 3.4* 5.2   CHLORIDE mmol/L 116* 109* 103   CO2 mmol/L 19* 23 24   BUN mg/dL 11 14 15   CREATININE mg/dL 0.95 1.34* 1.18   MAGNESIUM mg/dL 1.49* 1.91 1.97   PHOSPHORUS mg/dL 2.9 3.1 3.7       IMAGING RESULTS:  XR chest 1 view   Final Result   1. Medical devices as described above.   2. Mild bibasilar atelectasis                  Signed by: Ines Lexusoneida Díaz 9/22/2024 10:49 AM   Dictation workstation:   [id number]      XR abdomen 1 view   Final Result   1. Paucity of bowel-gas within the central abdomen with otherwise   nonobstructive pattern.   2. No acute cardiopulmonary abnormality.   3. Medical devices as described above.        I personally reviewed the image(s)/study and resident interpretation   as stated by Dr. Terri Delacruz MD. I agree with the findings as   stated. This study was interpreted at [place], [place].        MACRO:   None        Signed by: Yovanny Hook 9/21/2024 1:08 PM   Dictation workstation:   [id number]      XR chest 1 view   Final Result   1. Paucity of bowel-gas within the central abdomen with otherwise   nonobstructive pattern.   2. No acute cardiopulmonary abnormality.   3. Medical devices as described above.        I personally reviewed the image(s)/study and resident interpretation   as stated by Dr. Terri Delacruz MD. I agree with the findings as   stated. This study was interpreted at [facility], [place], [place].        MACRO:   None        Signed by: Yovanny Hook 9/21/2024 1:08 PM   Dictation workstation:   [id number]      Transthoracic Echo (TTE) Complete    (Results Pending)         Assessment/Plan    74 y.o. 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 Admitted to NSU for close monitoring - currently intubated on prop/fent and on cVEEG. Currently attempting to wean sedation with close EEG monitoring.     Updates 9/23:   - EEG prelim w/ bitemporal epileptogenicity (R>L) lobe epileptogenicity but no seizures. Will f/up final read  today.   - On prop 40, fent 200 - will attempt switching prop to precedex given significant hypotension while on prop.   - Blood cx neg, urine cx neg. Consider dc'ing zosyn (9/21 - ) given neg infectious w/up and no clear infectious source.   - Decreased urine output overnight, CTM      NEURO:  #Status epilepticus   - CTH: no acute intracranial abnormality  Assessment:  - See above  Plan:  - NSU  - Neuro Checks: Q1H  - Sedation:  prop 40, fent 200 - will attempt switching prop to precedex given significant hypotension while on prop.   - Pain: acetaminophen PRN  - cVEEG   - C/w Keppra 500mg BID and ZNS 400mg at bedtime   - UDS positive for cannabinoids, fent & benzo  - CIWA protocol (last drink 9/20), IV thiamine 500 mg TID x3 days followed by  mg daily, folic acid daily  - PT/OT/SLP    CARDIOVASCULAR:  #Afib  #HFrEF  #Hypotension iso Propofol  Plan:  - Continue to monitor on telemetry  - BP goal: Normotension  - Continue home eliquis 5 mg BID   - Hold home coreg given soft BP, resume if BP normalizes   - Pending TTE     RESPIRATORY:  #Hypoxic respiratory failure s/p intubation  CXR with no c/f pneumonia, fluid overload  Plan:  - Continuous pulse oximetry   - O2 PRN to maintain SpO2 > 94%, wean as tolerated  - After status epilepticus has been ruled out or sufficiently treated, can wean to extubate     RENAL/:  #MAYTE  Assessment:  - Baseline BUN/Cr: 18/1.2  Results from last 72 hours   Lab Units 09/23/24  0029 09/22/24  0109   BUN mg/dL 11 14   CREATININE mg/dL 0.95 1.34*   Plan:  - Monitor with daily RFP    FEN/GI:  #MAYTE  Assessment:  - Last BM Date:  (PTA)Last BM PTA  Plan:  - Monitor and replace electrolytes per protocol  - IVF: NS @ 75 mL/hr  - Diet: NPO   - Bowel Regimen: Miralax QD  - Consult nutrition for enteral nutrition recs     ENDOCRINE:  #MAYTE  Assessment:  Results from last 7 days   Lab Units 09/23/24  0029 09/22/24 2011 09/22/24  1636 09/22/24  1139 09/22/24  0710 09/22/24  0433 09/22/24  0109  24  2332   POCT GLUCOSE mg/dL  --  75 91 88 81 82  --  105*   GLUCOSE mg/dL 83  --   --   --   --   --  115*  --       Plan:  - Accuchecks & ISS Q6H     HEMATOLOGY:  #MAYTE  Assessment:  - Baseline Hgb: 15  - Baseline Plts: 130  Results from last 7 days   Lab Units 24  0029 24  0109   HEMOGLOBIN g/dL 12.0* 12.9*   HEMATOCRIT % 34.9* 37.0*   PLATELETS AUTO x10*3/uL 96* 109*     Plan:  - Continue to monitor with daily CBC and Coag panel    INFECTIOUS DISEASE:  #c/f sepsis  Assessment:  Results from last 7 days   Lab Units 24  0029 24  0109   WBC AUTO x10*3/uL 8.7 8.3    - Temp (24hrs), Av.6 °C (97.9 °F), Min:36.1 °C (97 °F), Max:37.2 °C (99 °F)   - MRSA negative  - UA with 500 leuk esterase, Nitrite negative, >50wbc, Ucx neg. Vanc . MRSA neg.   Plan:  - Continue to monitor for s/sx of infection  - Pan culture for temperature > 38.4 C  - Blood cx neg, urine cx neg. Consider dc'ing zosyn ( - ) given neg infectious w/up and no clear infectious source.     MUSCULOSKELETAL:  - No acute issues    SKIN:  - No acute issues  - Turns and skin care per NSU protocol    ACCESS:  - PIV    PROPHYLAXIS:  - DVT Ppx: Eliquis  - GI Ppx: Pantoprazole    RESTRAINTS:  I agree with nursing assessment of the patient's need for restraints to protect the patient from injury and facilitate healing. The patient is unable to cooperate with the plan of care and at risk for disrupting critical therapy (i.e., removing medical devices, lines, tubes and/or dressings).  Please see order for specifics. Restraints can be removed when the patient is able to cooperate with plan of care and allow healing to occur, or the medical devices at risk are discontinued by the medical team.     Tameka Liao MD   Neurology, PGY2  Neuroscience Intensive Care       Attending Attestation:   I saw and evaluated the patient. I personally obtained the key and critical portions of the history and physical exam or was physically  present for key and critical portions performed by the resident/fellow. I reviewed the resident/fellow's documentation and discussed the patient with the resident/fellow. I agree with the resident/fellow's medical decision making as documented in the note with the exception/addition of the followin yo on epielpsy, zonisamide and keppra, Afib on Eiquis     Neuro - Loaded with Keppra. EEG bitemporal lobe spikes. No seizures. Epilepsy followong. Increased zonismade. CIWA protocol.     Cardiac - Soft Bps  mm Hg. Echo EF 35% (). Afib on Eliquis..      Pulm - Acute resp insufficiency, wean as tolerated. CPAP trial.     GI - OG, start TF if not weaning sedation (per epilepsy)     ID - Zosyn empirically for possible sepsis. MRSA negative. NGTD, stop abx.     Vasc- SCDs, on full AC    Statement of Acuity: I have reviewed and evaluated all relevant data of the last 24 hours, personally examined the patient and formulated the plan of care.  This patient was critically ill and required continued critical care treatment.  Total critical care time: 35min.     Nabil Oliveira M.D.

## 2024-09-23 NOTE — PROGRESS NOTES
Paddy Solis is a 74 y.o. male on day 2 of admission presenting with Status epilepticus (Multi).      Subjective   Patient intubated and sedated on propofol 40 and fentanyl 200 on exam. Is alert to voice, follows simple commands.        Objective     Last Recorded Vitals  Blood pressure 86/51, pulse 77, temperature 37 °C (98.6 °F), temperature source Temporal, resp. rate 16, weight 66.2 kg (145 lb 15.1 oz), SpO2 99%.    Physical Exam  Physical Exam  HENT:      Head: Normocephalic.   Eyes:      Conjunctiva/sclera: Conjunctivae normal.   Cardiovascular:      Rate and Rhythm: Normal rate and regular rhythm.   Pulmonary:      Breath sounds: Normal breath sounds.      Comments: Intubted  Skin:     General: Skin is warm and dry.      Neurological Exam    Mental Status     Patient intubated and sedated on exam. Arouses to voice.     Cranial Nerves  CNII: Pupils equal, minimally reactive  CNIII,VI,VI: preference upward gaze, minimal movement right and left, did not appreciate downward gaze  CN V:  Right: Normal corneal reflex.  Left: Normal corneal reflex.     Motor     Patient spontaneously moves all extremities on exam. Follows simple commands, hand squeeze, and move toes b/l equally.      Sensory  Spontaneous movement.    Relevant Results                    Devon Coma Scale  Best Eye Response: Spontaneous  Best Verbal Response: Confused  Best Motor Response: Follows commands  Devon Coma Scale Score: 14      Scheduled medications  apixaban, 5 mg, oral, BID  [Held by provider] carvedilol, 25 mg, oral, BID  folic acid, 1 mg, oral, Daily  insulin lispro, 0-5 Units, subcutaneous, TID  levETIRAcetam, 500 mg, intravenous, q12h  magnesium sulfate, 2 g, intravenous, Once  multivitamin with minerals, 1 tablet, oral, Daily  oxygen, , inhalation, Continuous - Inhalation  perflutren lipid microspheres, 0.5-10 mL of dilution, intravenous, Once in imaging  piperacillin-tazobactam, 3.375 g, intravenous, q6h  polyethylene glycol,  17 g, oral, Daily  pyridoxine, 50 mg, oral, Daily  [START ON 9/24/2024] thiamine, 100 mg, oral, Daily  thiamine, 500 mg, intravenous, q8h PIPO  zonisamide, 400 mg, orogastric tube, Nightly      Continuous medications  fentaNYL,  mcg/hr, Last Rate: 200 mcg/hr (09/23/24 0800)  fentaNYL,  mcg/hr  propofol, 0-50 mcg/kg/min, Last Rate: 40 mcg/kg/min (09/23/24 0800)  sodium chloride 0.9%, 75 mL/hr, Last Rate: 75 mL/hr (09/23/24 0800)      PRN medications  PRN medications: acetaminophen, dextrose, dextrose, glucagon, glucagon, LORazepam **OR** LORazepam **OR** LORazepam, magnesium sulfate, magnesium sulfate, potassium chloride CR **OR** potassium chloride, potassium chloride CR **OR** potassium chloride, potassium chloride                 Results for orders placed or performed during the hospital encounter of 09/21/24 (from the past 24 hour(s))   POCT GLUCOSE   Result Value Ref Range    POCT Glucose 91 74 - 99 mg/dL   POCT GLUCOSE   Result Value Ref Range    POCT Glucose 75 74 - 99 mg/dL   CBC and Auto Differential   Result Value Ref Range    WBC 8.7 4.4 - 11.3 x10*3/uL    nRBC 0.0 0.0 - 0.0 /100 WBCs    RBC 3.78 (L) 4.50 - 5.90 x10*6/uL    Hemoglobin 12.0 (L) 13.5 - 17.5 g/dL    Hematocrit 34.9 (L) 41.0 - 52.0 %    MCV 92 80 - 100 fL    MCH 31.7 26.0 - 34.0 pg    MCHC 34.4 32.0 - 36.0 g/dL    RDW 13.7 11.5 - 14.5 %    Platelets 96 (L) 150 - 450 x10*3/uL    Neutrophils % 73.9 40.0 - 80.0 %    Immature Granulocytes %, Automated 0.5 0.0 - 0.9 %    Lymphocytes % 16.1 13.0 - 44.0 %    Monocytes % 8.9 2.0 - 10.0 %    Eosinophils % 0.3 0.0 - 6.0 %    Basophils % 0.3 0.0 - 2.0 %    Neutrophils Absolute 6.39 (H) 1.60 - 5.50 x10*3/uL    Immature Granulocytes Absolute, Automated 0.04 0.00 - 0.50 x10*3/uL    Lymphocytes Absolute 1.39 0.80 - 3.00 x10*3/uL    Monocytes Absolute 0.77 0.05 - 0.80 x10*3/uL    Eosinophils Absolute 0.03 0.00 - 0.40 x10*3/uL    Basophils Absolute 0.03 0.00 - 0.10 x10*3/uL   Renal Function Panel    Result Value Ref Range    Glucose 83 74 - 99 mg/dL    Sodium 143 136 - 145 mmol/L    Potassium 2.8 (LL) 3.5 - 5.3 mmol/L    Chloride 116 (H) 98 - 107 mmol/L    Bicarbonate 19 (L) 21 - 32 mmol/L    Anion Gap 11 10 - 20 mmol/L    Urea Nitrogen 11 6 - 23 mg/dL    Creatinine 0.95 0.50 - 1.30 mg/dL    eGFR 85 >60 mL/min/1.73m*2    Calcium 6.6 (L) 8.6 - 10.6 mg/dL    Phosphorus 2.9 2.5 - 4.9 mg/dL    Albumin 2.4 (L) 3.4 - 5.0 g/dL   Magnesium   Result Value Ref Range    Magnesium 1.49 (L) 1.60 - 2.40 mg/dL   Calcium, ionized   Result Value Ref Range    POCT Calcium, Ionized 1.02 (L) 1.1 - 1.33 mmol/L   Coagulation Screen   Result Value Ref Range    Protime 19.7 (H) 9.8 - 12.8 seconds    INR 1.7 (H) 0.9 - 1.1    aPTT 38 27 - 38 seconds   POCT GLUCOSE   Result Value Ref Range    POCT Glucose 80 74 - 99 mg/dL        EEG    Result Date: 9/22/2024  IMPRESSION This vEEG is indicative of a focal bitemporal (right > left) epileptogenicity with mild to moderate diffuse encephalopathy. No seizures or events are seen. A full report will be scanned into the patient's chart at a later time. This report has been interpreted and electronically signed by           Assessment/Plan      Assessment & Plan  Status epilepticus (Multi)    Paddy POPE Irma is a 73 y.o. 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 Admitted to NSU on 9/21/2024 for close monitoring with EEG and optimize AED regimen as needed.     EPILEPTIC Paroxysmal Episodes   Semiology:   1. Automotor > R Version > GTC w/ pos ictal psychosis** (left temporal onset)  2. Automotor seizure (D) (right temporal onset)  - Onset: April of 2021, possibly 1 year earlier (episodes of confusion)  - Frequency: **None since 05/23 prior to recent episode on 09/21  EZ: Bitemporal  Etiology: unknown   Comorbidities: HFrEF, NICM, afib  on Eliquis   Prior AEDs: OXC (hyponatremia), Depakote (cognitive side effects)  Current AEDs:  mg at bedtime, keppra XR 1000mg at bedtime --> now ZNS 400mg and Keppra 500mg BID     #Breakthrough Seizures  #CF status epilepticus  #Bitemporal epilepsy   - CTH: no acute intracranial abnormality  - S/p 2g Keppra load (~0100, 9/21/24)  - EEG read 09/22: bitemporal epileptogenicity (frequent spikes) but no seizures   - Pending EEG read 09/23  - No reported clinical seizures per nursing staff     Plan:  - Admitted to NSU with Q1H neuro checks  - cvEEG  - Rec to wean Propofol due to bradycardia, may consider dex, sedation per primary team and continue to monitor cvEEG  - Continue Zonisamide 400mg qHS  - C/w Keppra 500mg BID  - Follow medication levels and UA/CXR  - CIWA protocol (last drink 9/19)  - PT/OT/SLP     #Afib  #HFrEF  - Continue tele  - Continue home eliquis  - Hold home coreg given persistent hypotension      #Concern for Sepsis  - Hypotensive on arrival to ED in [place]; continues to be hypotensive in NSU  - Blood cultures (9/20/2024) taken and started on vanc/zosyn   - MRSA nares negative  - UA positive for 500 LE, blood, WBCs, RBCs, and protein, urine culture shows no growth to date     Plan:   - Vanc D/C'd on 9/22; zosyn continued pending infectious workup     #Hypoxic respiratory failure s/p intubation  - CXR with no c/f pneumonia, fluid overload  - After status epilepticus has been ruled out or sufficiently treated, can wean to extubate     Jose Mancini DO  PGY-1                 Jose Mancini DO

## 2024-09-23 NOTE — PROGRESS NOTES
Communication Note    Patient Name: Paddy Solis  MRN: [id number]  Today's Date: 9/23/2024   Room: 14/14A    Discipline: Physical Therapy      Missed Visit: Yes  Missed Visit Reason:  (Per RN, pt remains agitated and not appropriate for PT services at this time.)      09/23/24 at 8:21 AM   Gwen Coronado, PT   Rehab Office: [phone]

## 2024-09-24 ENCOUNTER — APPOINTMENT (OUTPATIENT)
Dept: RADIOLOGY | Facility: HOSPITAL | Age: 74
DRG: 100 | End: 2024-09-24
Payer: MEDICARE

## 2024-09-24 LAB
ALBUMIN SERPL BCP-MCNC: 2.4 G/DL (ref 3.4–5)
ANION GAP SERPL CALC-SCNC: 17 MMOL/L (ref 10–20)
APTT PPP: 31 SECONDS (ref 27–38)
ATRIAL RATE: 357 BPM
ATRIAL RATE: 78 BPM
BASOPHILS # BLD AUTO: 0.03 X10*3/UL (ref 0–0.1)
BASOPHILS NFR BLD AUTO: 0.4 %
BUN SERPL-MCNC: 9 MG/DL (ref 6–23)
CA-I BLD-SCNC: 1.11 MMOL/L (ref 1.1–1.33)
CALCIUM SERPL-MCNC: 7.5 MG/DL (ref 8.6–10.6)
CHLORIDE SERPL-SCNC: 110 MMOL/L (ref 98–107)
CO2 SERPL-SCNC: 16 MMOL/L (ref 21–32)
CREAT SERPL-MCNC: 0.78 MG/DL (ref 0.5–1.3)
EGFRCR SERPLBLD CKD-EPI 2021: >90 ML/MIN/1.73M*2
EOSINOPHIL # BLD AUTO: 0.03 X10*3/UL (ref 0–0.4)
EOSINOPHIL NFR BLD AUTO: 0.4 %
ERYTHROCYTE [DISTWIDTH] IN BLOOD BY AUTOMATED COUNT: 13.5 % (ref 11.5–14.5)
GLUCOSE BLD MANUAL STRIP-MCNC: 101 MG/DL (ref 74–99)
GLUCOSE BLD MANUAL STRIP-MCNC: 113 MG/DL (ref 74–99)
GLUCOSE BLD MANUAL STRIP-MCNC: 129 MG/DL (ref 74–99)
GLUCOSE BLD MANUAL STRIP-MCNC: 90 MG/DL (ref 74–99)
GLUCOSE BLD MANUAL STRIP-MCNC: 93 MG/DL (ref 74–99)
GLUCOSE SERPL-MCNC: 105 MG/DL (ref 74–99)
HCT VFR BLD AUTO: 38.3 % (ref 41–52)
HGB BLD-MCNC: 12.6 G/DL (ref 13.5–17.5)
IMM GRANULOCYTES # BLD AUTO: 0.03 X10*3/UL (ref 0–0.5)
IMM GRANULOCYTES NFR BLD AUTO: 0.4 % (ref 0–0.9)
INR PPP: 1.3 (ref 0.9–1.1)
LACTATE SERPL-SCNC: 0.8 MMOL/L (ref 0.4–2)
LYMPHOCYTES # BLD AUTO: 1 X10*3/UL (ref 0.8–3)
LYMPHOCYTES NFR BLD AUTO: 13.8 %
MAGNESIUM SERPL-MCNC: 1.65 MG/DL (ref 1.6–2.4)
MCH RBC QN AUTO: 32.2 PG (ref 26–34)
MCHC RBC AUTO-ENTMCNC: 32.9 G/DL (ref 32–36)
MCV RBC AUTO: 98 FL (ref 80–100)
MONOCYTES # BLD AUTO: 0.67 X10*3/UL (ref 0.05–0.8)
MONOCYTES NFR BLD AUTO: 9.3 %
NEUTROPHILS # BLD AUTO: 5.47 X10*3/UL (ref 1.6–5.5)
NEUTROPHILS NFR BLD AUTO: 75.7 %
NRBC BLD-RTO: 0 /100 WBCS (ref 0–0)
PHOSPHATE SERPL-MCNC: 2.9 MG/DL (ref 2.5–4.9)
PLATELET # BLD AUTO: 96 X10*3/UL (ref 150–450)
POTASSIUM SERPL-SCNC: 4.1 MMOL/L (ref 3.5–5.3)
PROTHROMBIN TIME: 14.7 SECONDS (ref 9.8–12.8)
Q ONSET: 222 MS
Q ONSET: 223 MS
QRS COUNT: 12 BEATS
QRS COUNT: 23 BEATS
QRS DURATION: 70 MS
QRS DURATION: 90 MS
QT INTERVAL: 340 MS
QT INTERVAL: 390 MS
QTC CALCULATION(BAZETT): 429 MS
QTC CALCULATION(BAZETT): 510 MS
QTC FREDERICIA: 416 MS
QTC FREDERICIA: 445 MS
R AXIS: -63 DEGREES
R AXIS: -67 DEGREES
RBC # BLD AUTO: 3.91 X10*6/UL (ref 4.5–5.9)
SODIUM SERPL-SCNC: 139 MMOL/L (ref 136–145)
T AXIS: 60 DEGREES
T AXIS: 63 DEGREES
T OFFSET: 392 MS
T OFFSET: 418 MS
VENTRICULAR RATE: 135 BPM
VENTRICULAR RATE: 73 BPM
WBC # BLD AUTO: 7.2 X10*3/UL (ref 4.4–11.3)

## 2024-09-24 PROCEDURE — 2500000004 HC RX 250 GENERAL PHARMACY W/ HCPCS (ALT 636 FOR OP/ED)

## 2024-09-24 PROCEDURE — 2500000001 HC RX 250 WO HCPCS SELF ADMINISTERED DRUGS (ALT 637 FOR MEDICARE OP)

## 2024-09-24 PROCEDURE — 36415 COLL VENOUS BLD VENIPUNCTURE: CPT

## 2024-09-24 PROCEDURE — 95720 EEG PHY/QHP EA INCR W/VEEG: CPT | Performed by: STUDENT IN AN ORGANIZED HEALTH CARE EDUCATION/TRAINING PROGRAM

## 2024-09-24 PROCEDURE — 83735 ASSAY OF MAGNESIUM: CPT

## 2024-09-24 PROCEDURE — 2500000002 HC RX 250 W HCPCS SELF ADMINISTERED DRUGS (ALT 637 FOR MEDICARE OP, ALT 636 FOR OP/ED)

## 2024-09-24 PROCEDURE — 97116 GAIT TRAINING THERAPY: CPT | Mod: GP

## 2024-09-24 PROCEDURE — 99232 SBSQ HOSP IP/OBS MODERATE 35: CPT

## 2024-09-24 PROCEDURE — 97530 THERAPEUTIC ACTIVITIES: CPT | Mod: GO

## 2024-09-24 PROCEDURE — 83605 ASSAY OF LACTIC ACID: CPT

## 2024-09-24 PROCEDURE — 2500000004 HC RX 250 GENERAL PHARMACY W/ HCPCS (ALT 636 FOR OP/ED): Performed by: STUDENT IN AN ORGANIZED HEALTH CARE EDUCATION/TRAINING PROGRAM

## 2024-09-24 PROCEDURE — 99291 CRITICAL CARE FIRST HOUR: CPT | Performed by: PSYCHIATRY & NEUROLOGY

## 2024-09-24 PROCEDURE — 2020000001 HC ICU ROOM DAILY

## 2024-09-24 PROCEDURE — 85610 PROTHROMBIN TIME: CPT

## 2024-09-24 PROCEDURE — 82330 ASSAY OF CALCIUM: CPT

## 2024-09-24 PROCEDURE — 95716 VEEG EA 12-26HR CONT MNTR: CPT

## 2024-09-24 PROCEDURE — 74177 CT ABD & PELVIS W/CONTRAST: CPT

## 2024-09-24 PROCEDURE — 92610 EVALUATE SWALLOWING FUNCTION: CPT | Mod: GN | Performed by: SPEECH-LANGUAGE PATHOLOGIST

## 2024-09-24 PROCEDURE — 2500000002 HC RX 250 W HCPCS SELF ADMINISTERED DRUGS (ALT 637 FOR MEDICARE OP, ALT 636 FOR OP/ED): Performed by: STUDENT IN AN ORGANIZED HEALTH CARE EDUCATION/TRAINING PROGRAM

## 2024-09-24 PROCEDURE — 2550000001 HC RX 255 CONTRASTS: Performed by: PSYCHIATRY & NEUROLOGY

## 2024-09-24 PROCEDURE — 97165 OT EVAL LOW COMPLEX 30 MIN: CPT | Mod: GO

## 2024-09-24 PROCEDURE — 80069 RENAL FUNCTION PANEL: CPT

## 2024-09-24 PROCEDURE — 97162 PT EVAL MOD COMPLEX 30 MIN: CPT | Mod: GP

## 2024-09-24 PROCEDURE — 85025 COMPLETE CBC W/AUTO DIFF WBC: CPT

## 2024-09-24 PROCEDURE — 82947 ASSAY GLUCOSE BLOOD QUANT: CPT

## 2024-09-24 PROCEDURE — 2500000001 HC RX 250 WO HCPCS SELF ADMINISTERED DRUGS (ALT 637 FOR MEDICARE OP): Performed by: STUDENT IN AN ORGANIZED HEALTH CARE EDUCATION/TRAINING PROGRAM

## 2024-09-24 PROCEDURE — 74177 CT ABD & PELVIS W/CONTRAST: CPT | Performed by: RADIOLOGY

## 2024-09-24 RX ORDER — POTASSIUM CHLORIDE 20 MEQ/1
40 TABLET, EXTENDED RELEASE ORAL EVERY 6 HOURS PRN
Status: DISCONTINUED | OUTPATIENT
Start: 2024-09-24 | End: 2024-09-26

## 2024-09-24 RX ORDER — POTASSIUM CHLORIDE 1.5 G/1.58G
20 POWDER, FOR SOLUTION ORAL EVERY 6 HOURS PRN
Status: DISCONTINUED | OUTPATIENT
Start: 2024-09-24 | End: 2024-09-26

## 2024-09-24 RX ORDER — CLONIDINE HYDROCHLORIDE 0.1 MG/1
0.3 TABLET ORAL EVERY 8 HOURS SCHEDULED
Status: DISCONTINUED | OUTPATIENT
Start: 2024-09-24 | End: 2024-09-24

## 2024-09-24 RX ORDER — ACETAMINOPHEN 325 MG/1
650 TABLET ORAL EVERY 4 HOURS PRN
Status: DISCONTINUED | OUTPATIENT
Start: 2024-09-24 | End: 2024-09-25

## 2024-09-24 RX ORDER — MULTIVIT-MIN/IRON FUM/FOLIC AC 7.5 MG-4
1 TABLET ORAL DAILY
Status: DISCONTINUED | OUTPATIENT
Start: 2024-09-25 | End: 2024-09-25

## 2024-09-24 RX ORDER — CLONIDINE HYDROCHLORIDE 0.1 MG/1
0.3 TABLET ORAL EVERY 8 HOURS SCHEDULED
Status: DISCONTINUED | OUTPATIENT
Start: 2024-09-25 | End: 2024-09-25

## 2024-09-24 RX ORDER — LANOLIN ALCOHOL/MO/W.PET/CERES
50 CREAM (GRAM) TOPICAL DAILY
Status: DISCONTINUED | OUTPATIENT
Start: 2024-09-25 | End: 2024-09-25

## 2024-09-24 RX ORDER — POTASSIUM CHLORIDE 1.5 G/1.58G
40 POWDER, FOR SOLUTION ORAL EVERY 6 HOURS PRN
Status: DISCONTINUED | OUTPATIENT
Start: 2024-09-24 | End: 2024-09-26

## 2024-09-24 RX ORDER — POLYETHYLENE GLYCOL 3350 17 G/17G
17 POWDER, FOR SOLUTION ORAL DAILY
Status: DISCONTINUED | OUTPATIENT
Start: 2024-09-25 | End: 2024-09-25

## 2024-09-24 RX ORDER — QUETIAPINE FUMARATE 25 MG/1
100 TABLET, FILM COATED ORAL NIGHTLY
Status: DISCONTINUED | OUTPATIENT
Start: 2024-09-24 | End: 2024-09-24

## 2024-09-24 RX ORDER — QUETIAPINE FUMARATE 25 MG/1
100 TABLET, FILM COATED ORAL NIGHTLY
Status: DISCONTINUED | OUTPATIENT
Start: 2024-09-25 | End: 2024-09-25

## 2024-09-24 RX ORDER — LANOLIN ALCOHOL/MO/W.PET/CERES
100 CREAM (GRAM) TOPICAL DAILY
Status: DISCONTINUED | OUTPATIENT
Start: 2024-09-25 | End: 2024-09-25

## 2024-09-24 RX ORDER — POTASSIUM CHLORIDE 20 MEQ/1
20 TABLET, EXTENDED RELEASE ORAL EVERY 6 HOURS PRN
Status: DISCONTINUED | OUTPATIENT
Start: 2024-09-24 | End: 2024-09-26

## 2024-09-24 RX ORDER — FOLIC ACID 1 MG/1
1 TABLET ORAL DAILY
Status: DISCONTINUED | OUTPATIENT
Start: 2024-09-25 | End: 2024-09-25

## 2024-09-24 RX ORDER — CARVEDILOL 25 MG/1
25 TABLET ORAL 2 TIMES DAILY
Status: DISCONTINUED | OUTPATIENT
Start: 2024-09-25 | End: 2024-09-30 | Stop reason: HOSPADM

## 2024-09-24 RX ORDER — TALC
6 POWDER (GRAM) TOPICAL DAILY
Status: DISCONTINUED | OUTPATIENT
Start: 2024-09-25 | End: 2024-09-25

## 2024-09-24 RX ADMIN — SODIUM CHLORIDE, POTASSIUM CHLORIDE, SODIUM LACTATE AND CALCIUM CHLORIDE 75 ML/HR: 600; 310; 30; 20 INJECTION, SOLUTION INTRAVENOUS at 08:56

## 2024-09-24 RX ADMIN — APIXABAN 5 MG: 5 TABLET, FILM COATED ORAL at 02:02

## 2024-09-24 RX ADMIN — PYRIDOXINE HCL TAB 50 MG 50 MG: 50 TAB at 08:05

## 2024-09-24 RX ADMIN — IOHEXOL 80 ML: 350 INJECTION, SOLUTION INTRAVENOUS at 03:20

## 2024-09-24 RX ADMIN — CLONIDINE HYDROCHLORIDE 0.3 MG: 0.1 TABLET ORAL at 14:14

## 2024-09-24 RX ADMIN — QUETIAPINE FUMARATE 50 MG: 25 TABLET ORAL at 02:03

## 2024-09-24 RX ADMIN — APIXABAN 5 MG: 5 TABLET, FILM COATED ORAL at 21:07

## 2024-09-24 RX ADMIN — QUETIAPINE FUMARATE 100 MG: 25 TABLET ORAL at 21:07

## 2024-09-24 RX ADMIN — DEXMEDETOMIDINE HYDROCHLORIDE 1 MCG/KG/HR: 400 INJECTION INTRAVENOUS at 14:14

## 2024-09-24 RX ADMIN — THIAMINE HCL TAB 100 MG 100 MG: 100 TAB at 14:14

## 2024-09-24 RX ADMIN — Medication 1 TABLET: at 08:04

## 2024-09-24 RX ADMIN — ZONISAMIDE 400 MG: 100 SUSPENSION ORAL at 21:08

## 2024-09-24 RX ADMIN — LEVETIRACETAM 500 MG: 5 INJECTION INTRAVENOUS at 09:00

## 2024-09-24 RX ADMIN — DEXMEDETOMIDINE HYDROCHLORIDE 1.3 MCG/KG/HR: 400 INJECTION INTRAVENOUS at 08:56

## 2024-09-24 RX ADMIN — MAGNESIUM SULFATE HEPTAHYDRATE 4 G: 40 INJECTION, SOLUTION INTRAVENOUS at 05:00

## 2024-09-24 RX ADMIN — DEXMEDETOMIDINE HYDROCHLORIDE 1 MCG/KG/HR: 400 INJECTION INTRAVENOUS at 19:24

## 2024-09-24 RX ADMIN — DEXMEDETOMIDINE HYDROCHLORIDE 1.3 MCG/KG/HR: 400 INJECTION INTRAVENOUS at 03:47

## 2024-09-24 RX ADMIN — APIXABAN 5 MG: 5 TABLET, FILM COATED ORAL at 08:04

## 2024-09-24 RX ADMIN — FOLIC ACID 1 MG: 1 TABLET ORAL at 08:05

## 2024-09-24 RX ADMIN — LORAZEPAM 1 MG: 2 INJECTION, SOLUTION INTRAMUSCULAR; INTRAVENOUS at 08:05

## 2024-09-24 RX ADMIN — BRIVARACETAM 50 MG: 50 INJECTION, SUSPENSION INTRAVENOUS at 21:07

## 2024-09-24 RX ADMIN — THIAMINE HYDROCHLORIDE 500 MG: 100 INJECTION, SOLUTION INTRAMUSCULAR; INTRAVENOUS at 05:06

## 2024-09-24 RX ADMIN — POLYETHYLENE GLYCOL 3350 17 G: 17 POWDER, FOR SOLUTION ORAL at 08:05

## 2024-09-24 RX ADMIN — CLONIDINE HYDROCHLORIDE 0.3 MG: 0.1 TABLET ORAL at 21:07

## 2024-09-24 RX ADMIN — Medication 6 MG: at 21:09

## 2024-09-24 ASSESSMENT — LIFESTYLE VARIABLES
TOTAL SCORE: 10
NAUSEA AND VOMITING: NO NAUSEA AND NO VOMITING
PAROXYSMAL SWEATS: NO SWEAT VISIBLE
AUDITORY DISTURBANCES: NOT PRESENT
VISUAL DISTURBANCES: NOT PRESENT
TREMOR: NO TREMOR
NAUSEA AND VOMITING: NO NAUSEA AND NO VOMITING
HEADACHE, FULLNESS IN HEAD: NOT PRESENT
ORIENTATION AND CLOUDING OF SENSORIUM: ORIENTED AND CAN DO SERIAL ADDITIONS
ORIENTATION AND CLOUDING OF SENSORIUM: ORIENTED AND CAN DO SERIAL ADDITIONS
HEADACHE, FULLNESS IN HEAD: NOT PRESENT
VISUAL DISTURBANCES: NOT PRESENT
ANXIETY: 2
VISUAL DISTURBANCES: NOT PRESENT
HEADACHE, FULLNESS IN HEAD: NOT PRESENT
NAUSEA AND VOMITING: NO NAUSEA AND NO VOMITING
VISUAL DISTURBANCES: NOT PRESENT
TREMOR: NO TREMOR
ANXIETY: NO ANXIETY, AT EASE
AGITATION: NORMAL ACTIVITY
AUDITORY DISTURBANCES: NOT PRESENT
TOTAL SCORE: 7
TREMOR: NO TREMOR
ORIENTATION AND CLOUDING OF SENSORIUM: DISORIENTED FOR DATE BY NO MORE THAN 2 CALENDAR DAYS
HEADACHE, FULLNESS IN HEAD: NOT PRESENT
AUDITORY DISTURBANCES: NOT PRESENT
VISUAL DISTURBANCES: NOT PRESENT
PAROXYSMAL SWEATS: NO SWEAT VISIBLE
HEADACHE, FULLNESS IN HEAD: NOT PRESENT
HEADACHE, FULLNESS IN HEAD: NOT PRESENT
ORIENTATION AND CLOUDING OF SENSORIUM: DISORIENTED FOR DATE BY NO MORE THAN 2 CALENDAR DAYS
ORIENTATION AND CLOUDING OF SENSORIUM: ORIENTED AND CAN DO SERIAL ADDITIONS
ANXIETY: 2
TREMOR: NO TREMOR
AGITATION: NORMAL ACTIVITY
AUDITORY DISTURBANCES: NOT PRESENT
TOTAL SCORE: 0
AGITATION: SOMEWHAT MORE THAN NORMAL ACTIVITY
HEADACHE, FULLNESS IN HEAD: NOT PRESENT
VISUAL DISTURBANCES: NOT PRESENT
AGITATION: 3
ORIENTATION AND CLOUDING OF SENSORIUM: DISORIENTED FOR DATE BY MORE THAN 2 CALENDAR DAYS
TOTAL SCORE: 0
TREMOR: NO TREMOR
ORIENTATION AND CLOUDING OF SENSORIUM: ORIENTED AND CAN DO SERIAL ADDITIONS
NAUSEA AND VOMITING: NO NAUSEA AND NO VOMITING
ANXIETY: MODERATELY ANXIOUS, OR GUARDED, SO ANXIETY IS INFERRED
AGITATION: NORMAL ACTIVITY
AGITATION: NORMAL ACTIVITY
NAUSEA AND VOMITING: NO NAUSEA AND NO VOMITING
TREMOR: NO TREMOR
ANXIETY: MILDLY ANXIOUS
TREMOR: NO TREMOR
HEADACHE, FULLNESS IN HEAD: NOT PRESENT
PAROXYSMAL SWEATS: NO SWEAT VISIBLE
TREMOR: NO TREMOR
NAUSEA AND VOMITING: NO NAUSEA AND NO VOMITING
TOTAL SCORE: 1
PAROXYSMAL SWEATS: NO SWEAT VISIBLE
ANXIETY: NO ANXIETY, AT EASE
TOTAL SCORE: 8
AUDITORY DISTURBANCES: NOT PRESENT
VISUAL DISTURBANCES: NOT PRESENT
ORIENTATION AND CLOUDING OF SENSORIUM: ORIENTED AND CAN DO SERIAL ADDITIONS
AGITATION: SOMEWHAT MORE THAN NORMAL ACTIVITY
PAROXYSMAL SWEATS: NO SWEAT VISIBLE
PAROXYSMAL SWEATS: NO SWEAT VISIBLE
ANXIETY: NO ANXIETY, AT EASE
AGITATION: SOMEWHAT MORE THAN NORMAL ACTIVITY
TREMOR: NO TREMOR
NAUSEA AND VOMITING: NO NAUSEA AND NO VOMITING
ORIENTATION AND CLOUDING OF SENSORIUM: ORIENTED AND CAN DO SERIAL ADDITIONS
ORIENTATION AND CLOUDING OF SENSORIUM: DISORIENTED FOR DATE BY MORE THAN 2 CALENDAR DAYS
PAROXYSMAL SWEATS: NO SWEAT VISIBLE
VISUAL DISTURBANCES: NOT PRESENT
TOTAL SCORE: 0
PAROXYSMAL SWEATS: NO SWEAT VISIBLE
HEADACHE, FULLNESS IN HEAD: NOT PRESENT
ANXIETY: NO ANXIETY, AT EASE
TOTAL SCORE: 5
NAUSEA AND VOMITING: NO NAUSEA AND NO VOMITING
AGITATION: NORMAL ACTIVITY
VISUAL DISTURBANCES: NOT PRESENT
AUDITORY DISTURBANCES: NOT PRESENT
PAROXYSMAL SWEATS: NO SWEAT VISIBLE
NAUSEA AND VOMITING: NO NAUSEA AND NO VOMITING
ANXIETY: MODERATELY ANXIOUS, OR GUARDED, SO ANXIETY IS INFERRED
AUDITORY DISTURBANCES: NOT PRESENT
AUDITORY DISTURBANCES: NOT PRESENT
TOTAL SCORE: 5
NAUSEA AND VOMITING: NO NAUSEA AND NO VOMITING
ANXIETY: NO ANXIETY, AT EASE
VISUAL DISTURBANCES: NOT PRESENT
AGITATION: MODERATELY FIDGETY AND RESTLESS
TOTAL SCORE: 1
AUDITORY DISTURBANCES: NOT PRESENT
HEADACHE, FULLNESS IN HEAD: NOT PRESENT
NAUSEA AND VOMITING: NO NAUSEA AND NO VOMITING
PAROXYSMAL SWEATS: NO SWEAT VISIBLE
HEADACHE, FULLNESS IN HEAD: NOT PRESENT
ANXIETY: NO ANXIETY, AT EASE
NAUSEA AND VOMITING: NO NAUSEA AND NO VOMITING
VISUAL DISTURBANCES: NOT PRESENT
AUDITORY DISTURBANCES: NOT PRESENT
ANXIETY: NO ANXIETY, AT EASE
PAROXYSMAL SWEATS: NO SWEAT VISIBLE
TREMOR: NO TREMOR
ORIENTATION AND CLOUDING OF SENSORIUM: ORIENTED AND CAN DO SERIAL ADDITIONS
ORIENTATION AND CLOUDING OF SENSORIUM: ORIENTED AND CAN DO SERIAL ADDITIONS
TREMOR: NO TREMOR
VISUAL DISTURBANCES: NOT PRESENT
AUDITORY DISTURBANCES: NOT PRESENT
HEADACHE, FULLNESS IN HEAD: NOT PRESENT
TOTAL SCORE: 0
TREMOR: NO TREMOR
PAROXYSMAL SWEATS: NO SWEAT VISIBLE
AGITATION: 3
AGITATION: SOMEWHAT MORE THAN NORMAL ACTIVITY
AUDITORY DISTURBANCES: NOT PRESENT

## 2024-09-24 ASSESSMENT — PAIN - FUNCTIONAL ASSESSMENT
PAIN_FUNCTIONAL_ASSESSMENT: 0-10

## 2024-09-24 ASSESSMENT — COGNITIVE AND FUNCTIONAL STATUS - GENERAL
DRESSING REGULAR UPPER BODY CLOTHING: A LITTLE
MOVING FROM LYING ON BACK TO SITTING ON SIDE OF FLAT BED WITH BEDRAILS: A LITTLE
WALKING IN HOSPITAL ROOM: TOTAL
HELP NEEDED FOR BATHING: A LOT
TURNING FROM BACK TO SIDE WHILE IN FLAT BAD: TOTAL
TOILETING: A LOT
MOVING TO AND FROM BED TO CHAIR: TOTAL
DAILY ACTIVITIY SCORE: 16
MOBILITY SCORE: 8
PERSONAL GROOMING: A LITTLE
DRESSING REGULAR LOWER BODY CLOTHING: A LOT
STANDING UP FROM CHAIR USING ARMS: TOTAL
CLIMB 3 TO 5 STEPS WITH RAILING: TOTAL

## 2024-09-24 ASSESSMENT — ACTIVITIES OF DAILY LIVING (ADL)
ADLS_ADDRESSED: NO
BATHING_ASSISTANCE: MAXIMAL
ADL_ASSISTANCE: INDEPENDENT
ADL_ASSISTANCE: INDEPENDENT

## 2024-09-24 ASSESSMENT — PAIN SCALES - GENERAL
PAINLEVEL_OUTOF10: 0 - NO PAIN

## 2024-09-24 NOTE — CONSULTS
"Nutrition Initial Assessment:   Nutrition Assessment    Reason for Assessment: Tube feeding recommendations    Patient is a 74 y.o. male presenting with c/f status epilepticus.    Initially intubated, now extubated.    DHT in place (tip terminating in gastric antrum/pylorus)      Nutrition History:  Energy Intake: Fair 50-75 %  Food and Nutrient History: Pt's wife reported his appetite had been declining for several months - mentioned it was likely multifactorial: meds vs increasing ETOH intake vs getting used to eating less. She stated some of the items he used to enjoy he no longer likes because it takes different (such as casserole and salmon). Also reported he seems to enjoy foods that are hot now vs cold foods - expressed that she believes this could be med related. Stated he will have breakfast, then snack during the day. Has not been eating as much at meal times, but explained she feels this may be because his stomach has shrunk from not eating as much. Noted that she has noticed this change from the start of summer this year. Reported he started drinking more and typically when he drinks he loses his appetite and sleeps more (drinks whiskey with apple juice - said at least 3 glasses of whiskey while sitting in the sun).  Food Allergies/Intolerances:  None  GI Symptoms: None  Oral Problems: None       Anthropometrics:  Height: 177.8 cm (5' 10\")   Weight: 66 kg (145 lb 8.1 oz)   BMI (Calculated): 20.88  IBW/kg (Dietitian Calculated): 70 kg  Percent of IBW: 94 %       Weight History:   Wt Readings from Last 8 Encounters:   09/24/24 66 kg (145 lb 8.1 oz)   09/20/24 59 kg (130 lb)   12/18/23 61.4 kg (135 lb 4.8 oz)   11/27/23 67.1 kg (148 lb)   06/26/23 68.5 kg (151 lb)   06/19/23 69.2 kg (152 lb 9 oz)   06/07/23 64.9 kg (143 lb)   05/15/23 67.6 kg (149 lb)     Weight Change %:  Weight History / % Weight Change: Pt's wife stated he was usually weighing 145# (65.9kg) around summer time and now is weighing ~132# " (60kg) - this represents a significant 9% wt loss  Significant Weight Loss: Yes  Interpretation of Weight Loss: >7.5% in 3 months    Nutrition Focused Physical Exam Findings:  Pt's wife explained she has noticed he looks much thinner than he used too and that she can see a lot of his bones/ribs   Subcutaneous Fat Loss:   Orbital Fat Pads: Severe (dark circles, hollowing and loose skin)  Buccal Fat Pads: Severe (hollow, sunken and narrow face)  Triceps: Severe (negligible fat tissue)  Muscle Wasting:  Temporalis: Severe (hollowed scooping depression)  Pectoralis (Clavicular Region): Severe (protruding prominent clavicle)  Deltoid/Trapezius: Severe (squared shoulders, acromion process prominent)  Interosseous: Severe (depressed area between thumb and forefinger)  Quadriceps: Severe (depressions on inner and outer thigh)  Gastrocnemius: Defer  Physical Findings:  Hair: Negative  Eyes:  (ANDRESSA)  Nails: Negative  Skin: Negative    Nutrition Significant Labs:  CBC Trend:   Results from last 7 days   Lab Units 09/24/24  0157 09/23/24  0029 09/22/24  0109 09/21/24  0948   WBC AUTO x10*3/uL 7.2 8.7 8.3 11.8*   RBC AUTO x10*6/uL 3.91* 3.78* 4.02* 4.71   HEMOGLOBIN g/dL 12.6* 12.0* 12.9* 14.9   HEMATOCRIT % 38.3* 34.9* 37.0* 43.9   MCV fL 98 92 92 93   PLATELETS AUTO x10*3/uL 96* 96* 109* 83*    , BMP Trend:   Results from last 7 days   Lab Units 09/24/24  0157 09/23/24  1026 09/23/24  0029 09/22/24  0109   GLUCOSE mg/dL 105* 76 83 115*   CALCIUM mg/dL 7.5* 7.8* 6.6* 8.8   SODIUM mmol/L 139 137 143 140   POTASSIUM mmol/L 4.1 4.6 2.8* 3.4*   CO2 mmol/L 16* 20* 19* 23   CHLORIDE mmol/L 110* 110* 116* 109*   BUN mg/dL 9 10 11 14   CREATININE mg/dL 0.78 1.07 0.95 1.34*    ,BG POCT trend:   Results from last 7 days   Lab Units 09/24/24  1159 09/24/24  0824 09/24/24  0111 09/23/24  2000 09/23/24  1208   POCT GLUCOSE mg/dL 93 90 129* 122* 82    , Renal Lab Trend:   Results from last 7 days   Lab Units 09/24/24  0157 09/23/24  1026  09/23/24  0029 09/22/24  0109   POTASSIUM mmol/L 4.1 4.6 2.8* 3.4*   PHOSPHORUS mg/dL 2.9 3.0 2.9 3.1   SODIUM mmol/L 139 137 143 140   MAGNESIUM mg/dL 1.65 2.93* 1.49* 1.91   EGFR mL/min/1.73m*2 >90 74 85 56*   BUN mg/dL 9 10 11 14   CREATININE mg/dL 0.78 1.07 0.95 1.34*        Nutrition Specific Medications:  Scheduled medications  apixaban, 5 mg, oral, BID  brivaracetam, 50 mg, intravenous, q12h PIPO  [Held by provider] carvedilol, 25 mg, oral, BID  cloNIDine, 0.3 mg, oral, q8h PIPO  folic acid, 1 mg, oral, Daily  insulin lispro, 0-5 Units, subcutaneous, TID  melatonin, 6 mg, oral, Daily  multivitamin with minerals, 1 tablet, oral, Daily  polyethylene glycol, 17 g, oral, Daily  pyridoxine, 50 mg, oral, Daily  QUEtiapine, 50 mg, oral, Nightly  thiamine, 100 mg, oral, Daily  zonisamide, 400 mg, orogastric tube, Nightly      Continuous medications  dexmedeTOMIDine, 0.1-1.5 mcg/kg/hr, Last Rate: 1.3 mcg/kg/hr (09/24/24 0856)  lactated Ringer's, 75 mL/hr, Last Rate: 75 mL/hr (09/24/24 0856)      I/O:   Last BM Date:  (pta); Stool Appearance: Unable to assess (09/24/24 0800)    Dietary Orders (From admission, onward)       Start     Ordered    09/21/24 0932  NPO Diet; Effective now  Diet effective now         09/21/24 0931         Estimated Needs:   Total Energy Estimated Needs (kCal): 1800 kCal  Method for Estimating Needs: 30kcal/kg per reported weight of 60kg  Total Protein Estimated Needs (g): 75 g  Method for Estimating Needs: ~1.2g/kg per reported wt of 60kg  Total Fluid Estimated Needs (mL):  (per team)        Nutrition Diagnosis   Malnutrition Diagnosis  Patient has Malnutrition Diagnosis: Yes  Diagnosis Status: New  Malnutrition Diagnosis: Severe malnutrition related to chronic disease or condition  As Evidenced by: significant wt loss (>7.5% wt loss x 3 months), severe muscle wasting, severe subcutaneous fat loss, inadequate intake (</=75% of needs for >/=1 month)    Nutrition Diagnosis  Patient has Nutrition  Diagnosis: Yes  Diagnosis Status (1): New  Nutrition Diagnosis 1: Increased nutrient needs  Related to (1): increased metabolic demand  As Evidenced by (1): critical illness       Nutrition Interventions/Recommendations         Nutrition Prescription:  Individualized Nutrition Prescription Provided for : enteral nutrition to meet ~1800kcals, ~75g pro        Nutrition Interventions:   Enteral Nutrition  Start Isosource 1.5 @ 10ml/hr, increase by 10mls every 12hrs to reach goal of 50ml/hr    Additional Interventions:   Recommend 100mg thiamine daily x 7 days.   RFP BID to closely monitor phos, potassium, magnesium   Additional water flushes per team.     TF provides 1200mls, 1800kcals, 81g pro, 916mls free H2O       Nutrition Monitoring and Evaluation   Food/Nutrient Related History Monitoring  Monitoring and Evaluation Plan: Enteral and parenteral nutrition intake  Enteral and Parenteral Nutrition Intake: Enteral nutrition intake  Criteria: TF to meet >/=80% of needs by day 3-4 of TF initiation    Body Composition/Growth/Weight History  Monitoring and Evaluation Plan: Weight  Weight: Measured weight  Criteria: Twice weekly    Biochemical Data, Medical Tests and Procedures  Monitoring and Evaluation Plan: Electrolyte/renal panel, Glucose/endocrine profile  Electrolyte and Renal Panel: Phosphorus, Magnesium, Potassium  Criteria: WNL while titrating TF as pt is refeeding risk  Glucose/Endocrine Profile: Glucose, casual    Time Spent (min): 60 minutes

## 2024-09-24 NOTE — CARE PLAN
Problem: Skin  Goal: Decreased wound size/increased tissue granulation at next dressing change  Outcome: Progressing     Problem: Skin  Goal: Participates in plan/prevention/treatment measures  Outcome: Progressing     Problem: Skin  Goal: Prevent/manage excess moisture  Outcome: Progressing     Problem: Skin  Goal: Prevent/minimize sheer/friction injuries  Outcome: Progressing     Problem: Skin  Goal: Promote/optimize nutrition  Outcome: Progressing     Problem: Skin  Goal: Promote skin healing  Outcome: Progressing       Problem: Pain - Adult  Goal: Verbalizes/displays adequate comfort level or baseline comfort level  Outcome: Not Progressing     Problem: Safety - Adult  Goal: Free from fall injury  Outcome: Not Progressing

## 2024-09-24 NOTE — PROGRESS NOTES
Physical Therapy    Physical Therapy Evaluation & Treatment    Patient Name: Paddy Solis  MRN: [id number]  Today's Date: 9/24/2024   Room: 14/14A  Time Calculation  Start Time: 1328  Stop Time: 1404  Time Calculation (min): 36 min    Assessment/Plan   PT Assessment  PT Assessment Results: Decreased endurance, Impaired balance, Decreased mobility, Decreased cognition, Decreased coordination, Decreased safety awareness  Rehab Prognosis: Excellent  Barriers to Discharge: Medical acuity  Evaluation/Treatment Tolerance: Patient tolerated treatment well  Strengths: Support and attitude of living partners  End of Session Communication: Bedside nurse  Assessment Comment: Pt to benefit from ongoing PT services to address the above limitations and to prepare pt for timely return to prior level of function.  End of Session Patient Position: Bed, 3 rail up, Alarm off, not on at start of session  IP OR SWING BED PT PLAN  Inpatient or Swing Bed: Inpatient  PT Plan  Treatment/Interventions: Bed mobility, Transfer training, Gait training, Stair training, Balance training, Neuromuscular re-education, Strengthening, Endurance training, Therapeutic exercise, Therapeutic activity, Home exercise program, Postural re-education  PT Plan: Ongoing PT  PT Frequency: 5 times per week  PT Discharge Recommendations: High intensity level of continued care  Equipment Recommended upon Discharge: Wheeled walker  PT Recommended Transfer Status: Assist x2  PT - OK to Discharge: Yes (When medically ready)      Subjective   General Visit Information:  Reason for Referral: Transfer from OSH with c/f status epilepticus. Admitted to [facility] on 9/21/2024 for close monitoring with EEG and optimize AED regimen as needed.  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     Home Living:  Home Living  Type of Home: House  Lives With: Spouse  Home Adaptive Equipment: None  Home Layout: Able to live on main level with bedroom/bathroom, Laundry in basement  Alternate Level Stairs-Number of Steps: Full flight to basement  Home Access: Stairs to enter with rails  Entrance Stairs-Number of Steps: 2-3  Bathroom Shower/Tub: Tub/shower unit    Prior Level of Function:  Prior Function Per Pt/Caregiver Report  Level of Mode: Independent with ADLs and functional transfers, Independent with homemaking with ambulation  ADL Assistance: Independent  Homemaking Assistance: Independent  Ambulatory Assistance: Independent  Vocational: Retired ()  Leisure: Karate  Prior Function Comments: Drives    Precautions:  Precautions  Hearing/Visual Limitations: Glasses full time, hearing WFL  Medical Precautions: Fall precautions, Seizure precautions  Precautions Comment: SBP <180, CIWA protocol    Vital Signs:     09/24/24 1328 09/24/24 1340 09/24/24 1404   Vital Signs   Vitals Session Pre PT During PT Post PT   Heart Rate 80 108 95   SpO2 97 % 98 % 97 %   /85 138/86 (!) 126/96   MAP (mmHg) 100 106 106   BP Method Automatic  --   --    Patient Position Lying Sitting Lying           Objective   Lines/Tubes/Drains:  Urethral Catheter (Active)   Number of days: 3       NG/OG/Feeding Tube Left nostril (Active)   Number of days: 0       Continuous Medications/Drips:  dexmedeTOMIDine, 1.5 mcg/kg/hr   lactated Ringer's, 75 mL/hr, Last Rate: 75 mL/hr (09/24/24 0856)        Oxygen: Room air     Pain:  Pain Assessment  Pain Assessment: 0-10  0-10 (Numeric) Pain Score: 0 - No pain    Cognition:  Cognition  Overall Cognitive Status: Impaired  Arousal/Alertness: Appropriate responses to  stimuli  Orientation Level: Disoriented to time  Following Commands: Follows one step commands with repetition  Safety Judgment: Decreased awareness of need for assistance  Cognition Comments: Labile mood. Becomes agitated after requiring assist to don socks, but redirects with gentle cues and redirection.  Insight: Moderate  Impulsive: Mildly  Processing Speed: Delayed      Extremity/Trunk Assessments:  Strength:       RUE   RUE : Within Functional Limits    LUE   LUE: Within Functional Limits    RLE   RLE : Exceptions to WFL  Strength RLE  R Hip Flexion: 5/5  R Knee Flexion: 5/5  R Knee Extension: 5/5  R Ankle Dorsiflexion: 5/5  R Ankle Plantar Flexion: 5/5    LLE   LLE : Exceptions to WFL  Strength LLE  L Hip Flexion: 5/5  L Knee Flexion: 5/5  L Knee Extension: 5/5  L Ankle Dorsiflexion: 5/5  L Ankle Plantar Flexion: 5/5    General Assessments:         Activity Tolerance  Endurance: Endurance does not limit participation in activity  Early Mobility/Exercise Safety Screen: Proceed with mobilization - No exclusion criteria met  Sensation  Light Touch: No apparent deficits  Coordination  Movements are Fluid and Coordinated: No  Finger to Nose: Bradykinesia  Static Sitting Balance  Static Sitting-Balance Support: Bilateral upper extremity supported, Feet supported  Static Sitting-Level of Assistance: Minimum assistance  Static Sitting-Comment/Number of Minutes: 10 min  Dynamic Sitting Balance  Dynamic Sitting-Balance Support: Bilateral upper extremity supported, Feet supported  Dynamic Sitting-Level of Assistance: Moderate assistance  Dynamic Sitting-Comments: During MMT sitting at edge of bed  Static Standing Balance  Static Standing-Balance Support: Bilateral upper extremity supported  Static Standing-Level of Assistance: Minimum assistance (x2)  Dynamic Standing Balance  Dynamic Standing-Balance Support: Bilateral upper extremity supported  Dynamic Standing-Level of Assistance: Moderate assistance  (x2)    Functional Assessments:  Bed Mobility  Bed Mobility: Yes  Bed Mobility 1  Bed Mobility 1: Supine to sitting  Level of Assistance 1:  (MOD A x1 at trunk, MIN A x1 at BLE)  Bed Mobility Comments 1: HOB elevated, cues for proper task sequencing and hand placement.  Bed Mobility 2  Bed Mobility  2: Sitting to supine  Level of Assistance 2: Minimum assistance (x2)  Bed Mobility Comments 2: HOB flat  Bed Mobility 3  Bed Mobility 3: Rolling left  Level of Assistance 3: Contact guard  Bed Mobility Comments 3: Cues for increased safety awareness.    Transfers  Transfer: Yes  Transfer 1  Transfer From 1: Bed to  Transfer to 1: Stand  Technique 1: Sit to stand, Stand to sit  Transfer Device 1:  (BUE arm in arm assist)  Transfer Level of Assistance 1: Minimum assistance, +2  Trials/Comments 1: Verbal/tactile cues for proper hand placement and controlling speed.    Stairs  Stairs: No         Treatment:  Skilled PT treatment was provided beyond the scope of evaluation, as follows:    Ambulation/Gait Training  Ambulation/Gait Training Performed: Yes  Ambulation/Gait Training 1  Surface 1: Level tile  Device 1:  (BUE arm in arm assist)  Assistance 1: Moderate assistance (x2)  Quality of Gait 1: Narrow base of support, Diminished heel strike, Decreased step length, Shuffling gait, Ataxic, Soft knee(s) (Retropulsive, worsens stepping bwd. Cues for increased B step length.)  Comments/Distance (ft) 1: 3 ft laterally + 3 steps fwd/bwd      Outcome Measures:  [facility] Basic Mobility  Turning from your back to your side while in a flat bed without using bedrails: A little  Moving from lying on your back to sitting on the side of a flat bed without using bedrails: Total  Moving to and from bed to chair (including a wheelchair): Total  Standing up from a chair using your arms (e.g. wheelchair or bedside chair): Total  To walk in hospital room: Total  Climbing 3-5 steps with railing: Total  Basic Mobility - Total Score: 8                    FSS-ICU  Ambulation: Walks <50 feet with any assistance x1 or walks any distance with assistance x2 people  Rolling: Minimal assistance (performs 75% or more of task)  Sitting: Minimal assistance (performs 75% or more of task)  Transfer Sit-to-Stand: Total assistance (performs 25% or requires another person)  Transfer Supine-to-Sit: Total assistance (performs 25% or requires another person)  Total Score: 11    ICU Mobility Screen  Early Mobility/Exercise Safety Screen: Proceed with mobilization - No exclusion criteria met  ICU Mobility Scale: Walking with assistance of 2 or more people                        Encounter Problems       Encounter Problems (Active)       PT Problem       Patient will perform bed mobility IND to reduce risk of developing decubitus ulcers.        Start:  09/24/24    Expected End:  10/08/24            Patient will perform sit to stand and stand to sit transfers with </= close sup and LRD to increase functional strength.        Start:  09/24/24    Expected End:  10/08/24            Patient will ambulate at least 150  ft. with </= close sup and LRD to improve tolerance of community distances.          Start:  09/24/24    Expected End:  10/08/24            Patient will ascend and descend >/= 2 steps with railing and </= close sup to facilitate safe navigation of stairs in the home.          Start:  09/24/24    Expected End:  10/08/24            Patient will perform 5x Sit to Stand test in <15 sec to indicate decreased risk of falling.        Start:  09/24/24    Expected End:  10/08/24               Pain - Adult              Education Documentation  Precautions, taught by Gwen Coronado, PT at 9/24/2024  3:42 PM.  Learner: Significant Other, Patient  Readiness: Acceptance  Method: Explanation  Response: Verbalizes Understanding, Needs Reinforcement    Body Mechanics, taught by Gwen Coronado, PT at 9/24/2024  3:42 PM.  Learner: Significant Other, Patient  Readiness:  Acceptance  Method: Explanation  Response: Verbalizes Understanding, Needs Reinforcement    Mobility Training, taught by Gwen Coronado PT at 9/24/2024  3:42 PM.  Learner: Significant Other, Patient  Readiness: Acceptance  Method: Explanation  Response: Verbalizes Understanding, Needs Reinforcement    Education Comments  No comments found.            09/24/24 at 3:42 PM   Gwen Coronado PT   Rehab Office: [phone]

## 2024-09-24 NOTE — NURSING NOTE
Bedside swallow done at 2100 and patient passed. PO meds were given and patient coughed on liquid medication. PT now made NPO and feeding tube placed.

## 2024-09-24 NOTE — ASSESSMENT & PLAN NOTE
Paddy Solis is a 73 y.o. 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 Admitted to [facility] on 9/21/2024 for close monitoring with EEG and optimize AED regimen as needed.

## 2024-09-24 NOTE — CARE PLAN
The clinical goals for the shift include patinet will remain free from harm and remain in the bed      Pt working toward all goals

## 2024-09-24 NOTE — ASSESSMENT & PLAN NOTE
Paddy Solis is a 73 y.o. 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 Admitted to [facility] on 9/21/2024 for close monitoring with EEG and optimize AED regimen as needed.

## 2024-09-24 NOTE — PROGRESS NOTES
Speech-Language Pathology  Adult Inpatient Clinical Bedside Swallow Evaluation    Patient Name: Paddy Solis  MRN: [id number]  Today's Date: 9/24/2024   Start Time: 1414  Stop Time: 1433  Time Calculation (min): 19    History of Present Illness:   Paddy Solis is a 73 y.o. 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 Admitted to [facility] on 9/21/2024 for close monitoring with EEG and optimize AED regimen as needed.     Assessment:   Clinical bedside swallow evaluation completed. Pt A&O x3 with unremarkable oral musculature. Cortrak and vEEG in place. Continue to be on precedex for CIWA protocol. Wife at bedside during evaluation. Stringent oral care provided as there was dried blood tinged secretion on soft palate. Tolerate ice chips and sips of water without difficulty. Proceeded with 3 oz of water in continuous sequential swallows. Strong post prandial cough upon completion of 3 oz of water is an indicator for the presence of dysphagia. Deferred further PO trials as this would place pt at high risk for aspiration. Instrumental exam recommended prior to any PO recommendations. Discussed results and recommendations with RN and MD.      Recommendations:  NPO  Frequent, aggressive oral care is strongly recommended to improve infection control as well as reduce dental plaque and bacteria on oropharyngeal surfaces which may increase the risk nosocomial infections, including pneumonia.    OK for small amounts of ice chips (one at a time, 10x/hour) for pleasure/swallow stimulation, but only after aggressive oral care to avoid colonization of bacteria within oral cavity.    Goal:   Pt. will tolerate least restrictive diet without overt s/s of aspiration with 100% accuracy.       Plan:  SLP Services Indicated: Yes  Frequency: 2x week  Discussed POC with patient and  spouse  SLP - OK to Discharge    Pain:   0-10  0 = No pain.     Inpatient Education:  Extensive education provided to patient and spouse regarding current swallow function, recommendations/results, and POC.      Consultations/Referrals/Coordination of Services:   N/A

## 2024-09-24 NOTE — PROGRESS NOTES
Paddy Solis is a 74 y.o. male on day 3 of admission presenting with Status epilepticus (Multi).      Subjective   Patient was awake, extubated, and resting in bed with dobhoff in place. Was alert and oriented to self, location, and year. Was able to follow complex commands. Nursing staff reported increased agitation since extubation.       Objective      Last Recorded Vitals  Blood pressure (!) 143/103, pulse 81, temperature 36.3 °C (97.3 °F), temperature source Temporal, resp. rate 25, weight 66 kg (145 lb 8.1 oz), SpO2 99%.      Physical Exam  Vitals and nursing note reviewed.   Constitutional:       Appearance: Normal appearance. He is normal weight.   HENT:      Head: Normocephalic.      Nose: Nose normal.      Mouth/Throat:      Mouth: Mucous membranes are dry.   Eyes:      General: Vision grossly intact. Gaze aligned appropriately.      Extraocular Movements: EOM normal. No nystagmus.      Conjunctiva/sclera: Conjunctivae normal.   Neck:      Trachea: Trachea normal.   Cardiovascular:      Rate and Rhythm: Normal rate. Rhythm irregular.      Pulses: Normal pulses.   Pulmonary:      Effort: Pulmonary effort is normal.      Breath sounds: Normal breath sounds.   Musculoskeletal:         General: Normal range of motion.      Cervical back: Normal range of motion.   Skin:     General: Skin is warm and dry.   Neurological:      Mental Status: He is oriented to person, place, and time.      Motor: Motor strength is normal.  Psychiatric:         Speech: Speech is spontaneous, slurred.         Behavior: Behavior is agitated.         Cognition and Memory: Memory is impaired.      Comments: Was able to recall why he was in the hospital, name family members, etc. Denied recall of events leading up to hospitalization.     Neurological Exam  Mental Status   Oriented to person, place, time and situation. Oriented to person, place, and time. Follows complex commands.    Cranial Nerves  CN II: Right visual acuity: Normal.  Left visual acuity: Normal. Right normal visual field. Left normal visual field.  CN III, IV, VI: Extraocular movements intact bilaterally. No nystagmus. Normal smooth pursuit.   Right pupil: 3 mm. Round. Reactive to light. Reactive to accommodation.   Left pupil: 3 mm. Round. Reactive to light. Reactive to accommodation.  Relative afferent pupillary defect absent.    Motor  Normal muscle bulk throughout. No fasciculations present. Normal muscle tone. No abnormal involuntary movements. Strength is 5/5 throughout all four extremities.    Sensory  Light touch is normal in upper and lower extremities.       Scheduled medications  apixaban, 5 mg, oral, BID  brivaracetam, 50 mg, intravenous, q12h PIPO  [Held by provider] carvedilol, 25 mg, oral, BID  cloNIDine, 0.3 mg, oral, q8h PIPO  folic acid, 1 mg, oral, Daily  insulin lispro, 0-5 Units, subcutaneous, TID  melatonin, 6 mg, oral, Daily  multivitamin with minerals, 1 tablet, oral, Daily  polyethylene glycol, 17 g, oral, Daily  pyridoxine, 50 mg, oral, Daily  QUEtiapine, 50 mg, oral, Nightly  thiamine, 100 mg, oral, Daily  zonisamide, 400 mg, orogastric tube, Nightly      Continuous medications  dexmedeTOMIDine, 0.1-1.5 mcg/kg/hr, Last Rate: 1.3 mcg/kg/hr (09/24/24 0856)  lactated Ringer's, 75 mL/hr, Last Rate: 75 mL/hr (09/24/24 0856)      PRN medications  PRN medications: acetaminophen, dextrose, dextrose, glucagon, glucagon, LORazepam **OR** LORazepam **OR** LORazepam, magnesium sulfate, magnesium sulfate, potassium chloride CR **OR** potassium chloride, potassium chloride CR **OR** potassium chloride, potassium chloride    Relevant Results    Results for orders placed or performed during the hospital encounter of 09/21/24 (from the past 24 hour(s))   Transthoracic Echo (TTE) Complete   Result Value Ref Range    AV pk tristin 1.13 m/s    LVOT diam 2.00 cm    LA vol index A/L 55.0 ml/m2    Tricuspid annular plane systolic excursion 1.7 cm    LV EF 53 %    RV free wall pk S'  10.00 cm/s    LVIDd 4.80 cm    RVSP 40.0 mmHg    Aortic Valve Area by Continuity of Peak Velocity 2.32 cm2    AV pk grad 5.1 mmHg    LV A4C EF 47.0    POCT GLUCOSE   Result Value Ref Range    POCT Glucose 82 74 - 99 mg/dL   POCT GLUCOSE   Result Value Ref Range    POCT Glucose 122 (H) 74 - 99 mg/dL   Coagulation Screen   Result Value Ref Range    Protime 19.2 (H) 9.8 - 12.8 seconds    INR 1.7 (H) 0.9 - 1.1    aPTT 36 27 - 38 seconds   Lactate   Result Value Ref Range    Lactate 0.8 0.4 - 2.0 mmol/L   POCT GLUCOSE   Result Value Ref Range    POCT Glucose 129 (H) 74 - 99 mg/dL   CBC and Auto Differential   Result Value Ref Range    WBC 7.2 4.4 - 11.3 x10*3/uL    nRBC 0.0 0.0 - 0.0 /100 WBCs    RBC 3.91 (L) 4.50 - 5.90 x10*6/uL    Hemoglobin 12.6 (L) 13.5 - 17.5 g/dL    Hematocrit 38.3 (L) 41.0 - 52.0 %    MCV 98 80 - 100 fL    MCH 32.2 26.0 - 34.0 pg    MCHC 32.9 32.0 - 36.0 g/dL    RDW 13.5 11.5 - 14.5 %    Platelets 96 (L) 150 - 450 x10*3/uL    Neutrophils % 75.7 40.0 - 80.0 %    Immature Granulocytes %, Automated 0.4 0.0 - 0.9 %    Lymphocytes % 13.8 13.0 - 44.0 %    Monocytes % 9.3 2.0 - 10.0 %    Eosinophils % 0.4 0.0 - 6.0 %    Basophils % 0.4 0.0 - 2.0 %    Neutrophils Absolute 5.47 1.60 - 5.50 x10*3/uL    Immature Granulocytes Absolute, Automated 0.03 0.00 - 0.50 x10*3/uL    Lymphocytes Absolute 1.00 0.80 - 3.00 x10*3/uL    Monocytes Absolute 0.67 0.05 - 0.80 x10*3/uL    Eosinophils Absolute 0.03 0.00 - 0.40 x10*3/uL    Basophils Absolute 0.03 0.00 - 0.10 x10*3/uL   Renal Function Panel   Result Value Ref Range    Glucose 105 (H) 74 - 99 mg/dL    Sodium 139 136 - 145 mmol/L    Potassium 4.1 3.5 - 5.3 mmol/L    Chloride 110 (H) 98 - 107 mmol/L    Bicarbonate 16 (L) 21 - 32 mmol/L    Anion Gap 17 10 - 20 mmol/L    Urea Nitrogen 9 6 - 23 mg/dL    Creatinine 0.78 0.50 - 1.30 mg/dL    eGFR >90 >60 mL/min/1.73m*2    Calcium 7.5 (L) 8.6 - 10.6 mg/dL    Phosphorus 2.9 2.5 - 4.9 mg/dL    Albumin 2.4 (L) 3.4 - 5.0 g/dL    Magnesium   Result Value Ref Range    Magnesium 1.65 1.60 - 2.40 mg/dL   Calcium, ionized   Result Value Ref Range    POCT Calcium, Ionized 1.11 1.1 - 1.33 mmol/L   Coagulation Screen   Result Value Ref Range    Protime 14.7 (H) 9.8 - 12.8 seconds    INR 1.3 (H) 0.9 - 1.1    aPTT 31 27 - 38 seconds   POCT GLUCOSE   Result Value Ref Range    POCT Glucose 90 74 - 99 mg/dL        EEG 09/23/24    IMPRESSION Impression This cvEEG is indicative of a focal bitemporal (right > left) epileptogenicity and a mild diffuse encephalopathy. No seizures or events are seen.. A full report will be scanned into the patient's chart at a later time. This report has been interpreted and electronically signed by                     [place] Coma Scale  Best Eye Response: Spontaneous  Best Verbal Response: Oriented  Best Motor Response: Follows commands  [place] Coma Scale Score: 15                        Assessment/Plan      Assessment & Plan  Status epilepticus (Multi)    Paddy Solis is a 73 y.o. 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 Admitted to NSU on 9/21/2024 for close monitoring with EEG and optimize AED regimen as needed.     EPILEPTIC Paroxysmal Episodes   Semiology:   1. Automotor > R Version > GTC w/ pos ictal psychosis** (left temporal onset)  2. Automotor seizure (D) (right temporal onset)  - Onset: April of 2021, possibly 1 year earlier (episodes of confusion)  - Frequency: **None since 05/23 prior to recent episode on 09/21  EZ: Bitemporal  Etiology: unknown   Comorbidities: HFrEF, NICM, afib on Eliquis   Prior AEDs: OXC (hyponatremia), Depakote (cognitive side effects)  Current AEDs:  mg at bedtime, keppra XR 1000mg at bedtime --> now ZNS 400mg and Keppra 500mg BID     #Breakthrough Seizures  #CF status epilepticus  #Bitemporal epilepsy   - CTH: no  acute intracranial abnormality  - S/p 2g Keppra load (~0100, 9/21/24)  - EEG read 09/22: bitemporal epileptogenicity (frequent spikes) but no seizures.  - EEG read 09/23: bitemporal epileptogenicity - no seizures or events seen.  - No reported clinical seizures per nursing staff     Plan:  - Continue NSU admission  - Discontinue vEEG  - Recommend to wean precedex  - Continue Zonisamide 400mg qHS  - per primary team, concern for increase agitation with Keppra 500-500, rec to switch to Briviact 50-50 while inpatient.  - Follow medication levels and UA/CXR  - CIWA protocol (last drink 9/19), s/p two doses 1 mg ativan   - PT/OT/SLP     #Afib  #HFrEF  - Continue tele  - Continue home eliquis  - Consider reintroducing home coreg due to increased BP.      #Concern for Sepsis  - Hypotensive on arrival to ED in [place]; continues to be hypotensive in NSU  - Blood cultures (9/20/2024) taken and started on vanc/zosyn.   - MRSA nares negative  - UA positive for 500 LE, blood, WBCs, RBCs, and protein, urine culture shows no growth to date  - Blood culture (preliminary results) indicate no growth at 3 days (as of 9/24/2024).     Plan:   - Vanc D/C'd on 9/22; zosyn continued pending infectious workup         RIK DIAZ - MS3      Patient seen and examined under my supervision. Documentation reviewed and edited were necessary.     Jose Mancini, DO PGY-1

## 2024-09-24 NOTE — PROGRESS NOTES
[facility]  NEUROSCIENCE INTENSIVE CARE UNIT  DAILY PROGRESS NOTE       Patient Name: Paddy Solis   MRN: [id number]     Admit Date: 2024     : 1950 AGE: 74 y.o. GENDER: male        Subjective    74 y.o. 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  Admitted 2024 with Status epilepticus (Multi) after presenting  to [facility] as a transfer from [place] with c/f status epilepticus. Admitted to NSU for close monitoring with EEG and optimize AED regimen as needed. Patient arrived intubated and sedated on propofol. Loaded with 2g of Keppra PTA. CTH obtained was unremarkable. Extubated  - remains on precedex for agitation.    Interval Events:   DHT placed overnight. Remains on precedex 1.3 for agitation. Some concern for ?thickened bowel loops + pneumatosis on KUB - CT AP obtained which was unremarkable in this regard - some small pleural effusions noted. Lactate wnl. Abdomen non tender.            Objective   VITALS (24H):  Temp:  [36 °C (96.8 °F)-37 °C (98.6 °F)] 36.7 °C (98 °F)  Heart Rate:  [] 74  Resp:  [8-22] 21  BP: ()/() 139/90  FiO2 (%):  [40 %] 40 %  INTAKE/OUTPUT:  Intake/Output Summary (Last 24 hours) at 2024 0737  Last data filed at 2024 0700  Gross per 24 hour   Intake 2679.86 ml   Output 2475 ml   Net 204.86 ml     VENT SETTINGS:  Vent Mode: Volume control/assist control  FiO2 (%):  [40 %] 40 %  S RR:  [16] 16  S VT:  [450 mL] 450 mL  PEEP/CPAP (cm H2O):  [5 cm H20] 5 cm H20  MAP (cm H2O):  [8] 8     PHYSICAL EXAM:  NEURO:  - Awake, alert, PERRL, midline gaze, tracking across the room. KEARNS spontaneously with full strength.   CV:  - RRR on telemetry, NSR  RESP:  - NC   :  - indwelling catheter in place  GI:  - Abdomen NT/ND, soft  SKIN:  - Intact    MEDICATIONS:  Scheduled: PRN: Continuous:   apixaban, 5 mg, oral, BID  [Held by provider] carvedilol, 25 mg, oral,  BID  folic acid, 1 mg, oral, Daily  insulin lispro, 0-5 Units, subcutaneous, TID  levETIRAcetam, 500 mg, intravenous, q12h  melatonin, 6 mg, oral, Daily  multivitamin with minerals, 1 tablet, oral, Daily  polyethylene glycol, 17 g, oral, Daily  pyridoxine, 50 mg, oral, Daily  QUEtiapine, 50 mg, oral, Nightly  thiamine, 100 mg, oral, Daily  zonisamide, 400 mg, orogastric tube, Nightly     PRN medications: acetaminophen, dextrose, dextrose, glucagon, glucagon, LORazepam **OR** LORazepam **OR** LORazepam, magnesium sulfate, magnesium sulfate, potassium chloride CR **OR** potassium chloride, potassium chloride CR **OR** potassium chloride, potassium chloride dexmedeTOMIDine, 0.1-1.5 mcg/kg/hr, Last Rate: 1.3 mcg/kg/hr (09/24/24 0600)  lactated Ringer's, 75 mL/hr, Last Rate: 75 mL/hr (09/24/24 0700)         LAB RESULTS:  Results from last 72 hours   Lab Units 09/24/24  0157 09/23/24  0029 09/22/24  0109   WBC AUTO x10*3/uL 7.2 8.7 8.3   HEMOGLOBIN g/dL 12.6* 12.0* 12.9*   HEMATOCRIT % 38.3* 34.9* 37.0*   PLATELETS AUTO x10*3/uL 96* 96* 109*        Results from last 72 hours   Lab Units 09/24/24  0157 09/23/24  1026 09/23/24  0029   SODIUM mmol/L 139 137 143   POTASSIUM mmol/L 4.1 4.6 2.8*   CHLORIDE mmol/L 110* 110* 116*   CO2 mmol/L 16* 20* 19*   BUN mg/dL 9 10 11   CREATININE mg/dL 0.78 1.07 0.95   MAGNESIUM mg/dL 1.65 2.93* 1.49*   PHOSPHORUS mg/dL 2.9 3.0 2.9      IMAGING RESULTS:  CT abdomen pelvis w IV contrast         XR abdomen 1 view         Transthoracic Echo (TTE) Complete   Final Result      XR chest 1 view   Final Result   1. Medical devices as described above.   2. Mild bibasilar atelectasis                  Signed by: Ines Díaz 9/22/2024 10:49 AM   Dictation workstation:   [id number]      XR abdomen 1 view   Final Result   1. Paucity of bowel-gas within the central abdomen with otherwise   nonobstructive pattern.   2. No acute cardiopulmonary abnormality.   3. Medical devices as described above.         I personally reviewed the image(s)/study and resident interpretation   as stated by Dr. Terri Delacruz MD. I agree with the findings as   stated. This study was interpreted at [place], [place].        MACRO:   None        Signed by: Yovanny Hook 9/21/2024 1:08 PM   Dictation workstation:   [id number]      XR chest 1 view   Final Result   1. Paucity of bowel-gas within the central abdomen with otherwise   nonobstructive pattern.   2. No acute cardiopulmonary abnormality.   3. Medical devices as described above.        I personally reviewed the image(s)/study and resident interpretation   as stated by Dr. Terri Delacruz MD. I agree with the findings as   stated. This study was interpreted at [place], [place].        MACRO:   None        Signed by: Yovanny Hook 9/21/2024 1:08 PM   Dictation workstation:   [id number]            Assessment/Plan    74 y.o. 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 Admitted to NSU for close monitoring -briefly intubated and now extubated. Remains on precedex for agitation. On cVEEG which has been negative for seizures - bitemporal epileptogenicity    Updates 9/24:   - Wean precedex as tolerated.   - Continue cVEEG - will f/up final read.   - Initiate TF per nutrition recs  - TTE 9/23 with improved EF - otherwise similar LA dilatation.     NEURO:  #Status epilepticus   - CTH: no acute intracranial abnormality  Assessment:  - See above  Plan:  - NSU  - Neuro Checks: Q1H  - Sedation: none. On precedex 1.3 for agitation.   - Pain: acetaminophen PRN  - cVEEG   - C/w Keppra 500mg BID and ZNS 400mg at bedtime   - UDS positive for cannabinoids, fent & benzo  - CIWA protocol (last drink 9/20), IV thiamine 500 mg TID x3 days followed by  mg  daily, folic acid daily  - PT/OT/SLP    CARDIOVASCULAR:  #Afib  #HFrEF  #Hypotension iso Propofol - resolved   Assessment:   - Repeat TTE  with improved EF to 50-55% from 35-40%, severe LA dilatation.   Plan:  - Continue to monitor on telemetry  - BP goal: Normotension  - Continue home eliquis 5 mg BID   - Hold home coreg given soft BP, resume if BP normalizes     RESPIRATORY:  #Hypoxic respiratory failure s/p intubation, now extubated   CXR with no c/f pneumonia, fluid overload  Plan:  - Continuous pulse oximetry   - O2 PRN to maintain SpO2 > 94%, wean as tolerated    RENAL/:  #MAYTE  Assessment:  - Baseline BUN/Cr: 18/1.2  Results from last 72 hours   Lab Units 24  0157 24  1026   BUN mg/dL 9 10   CREATININE mg/dL 0.78 1.07   Plan:  - Monitor with daily RFP    FEN/GI:  #MAYTE  Assessment:  - Last BM Date:  (PTA)Last BM PTA  Plan:  - Monitor and replace electrolytes per protocol  - IVF: NS @ 75 mL/hr  - Diet: NPO   - Bowel Regimen: Miralax QD  - Consult nutrition for enteral nutrition recs     ENDOCRINE:  #MAYTE  Assessment:  Results from last 7 days   Lab Units 24  0157 24  0111 24  1208 24  1026 24  0826 24  0029 24  1636   POCT GLUCOSE mg/dL  --  129* 122* 82  --  80  --  75 91   GLUCOSE mg/dL 105*  --   --   --  76  --    < >  --   --     < > = values in this interval not displayed.      Plan:  - Accuchecks & ISS Q6H     HEMATOLOGY:  #MAYTE  Assessment:  - Baseline Hgb: 15  - Baseline Plts: 130  Results from last 7 days   Lab Units 24  0157 24  0029   HEMOGLOBIN g/dL 12.6* 12.0*   HEMATOCRIT % 38.3* 34.9*   PLATELETS AUTO x10*3/uL 96* 96*     Plan:  - Continue to monitor with daily CBC and Coag panel    INFECTIOUS DISEASE:  #c/f sepsis  Assessment:  Results from last 7 days   Lab Units 24  0157 24  0029   WBC AUTO x10*3/uL 7.2 8.7    - Temp (24hrs), Av.7 °C (98 °F), Min:36 °C (96.8 °F), Max:37 °C  (98.6 °F)   - MRSA negative  - UA with 500 leuk esterase, Nitrite negative, >50wbc, Ucx neg. Vanc . MRSA neg.  Zosyn  - infectious work up neg - Blood cx, urine cx neg.   Plan:  - Continue to monitor for s/sx of infection  - Pan culture for temperature > 38.4 C    MUSCULOSKELETAL:  - No acute issues    SKIN:  - No acute issues  - Turns and skin care per NSU protocol    ACCESS:  - PIV    PROPHYLAXIS:  - DVT Ppx: Eliquis  - GI Ppx: Pantoprazole    RESTRAINTS:  I agree with nursing assessment of the patient's need for restraints to protect the patient from injury and facilitate healing. The patient is unable to cooperate with the plan of care and at risk for disrupting critical therapy (i.e., removing medical devices, lines, tubes and/or dressings).  Please see order for specifics. Restraints can be removed when the patient is able to cooperate with plan of care and allow healing to occur, or the medical devices at risk are discontinued by the medical team.     Tameka Liao MD   Neurology, PGY2  Neuroscience Intensive Care     Attending Attestation:   I saw and evaluated the patient. I personally obtained the key and critical portions of the history and physical exam or was physically present for key and critical portions performed by the resident/fellow. I reviewed the resident/fellow's documentation and discussed the patient with the resident/fellow. I agree with the resident/fellow's medical decision making as documented in the note with the exception/addition of the followin yo on epielpsy, zonisamide and keppra, Afib on Eiquis     Neuro - Loaded with Keppra. EEG bitemporal lobe spikes. No seizures. Epilepsy followong. Increased zonismade. CIWA protocol.  On precedex for agitation, wean as tolerated, starting clonidine to assist.  On PRN ativan and seroquel.     Cardiac - Hypotension resolved. Echo EF 35% (). Afib on Eliquis. Holding Coreg currently.     Pulm - Extubated . On NC 2L.      GI - Dobhoff in place, SLP re-assess.    ID - Of abx.    Vasc- SCDs, on full AC     Statement of Acuity: I have reviewed and evaluated all relevant data of the last 24 hours, personally examined the patient and formulated the plan of care.  This patient was critically ill and required continued critical care treatment.  Total critical care time: 35min.      Nabil Oliveira M.D.

## 2024-09-24 NOTE — POST-PROCEDURE NOTE
Small bore feeding tube placement    12 FR small bore feeding tube placed in left nares times 1 attempt using IRIS video technology.  Tube secured with bridle @ 90 cm.  Pt tolerated placement well.  X ray ordered for confirmation of placement.  No epistaxis.    .

## 2024-09-24 NOTE — PROGRESS NOTES
Occupational Therapy    Evaluation/Treatment    Patient Name: Paddy Solis  MRN: [id number]  Department: [facility]  Room: 14/14-A  Today's Date: 09/24/24  Time Calculation  Start Time: 1328  Stop Time: 1408  Time Calculation (min): 40 min       Assessment:  OT Assessment: Pt demonstrated deficits in ADL/IADL participation, cognition, safety, fine/gross motor control, trunk control, endurance, UE strength, bed mobility, transfers, and functional mobility. Pt would benefit from skilled OT to address deficits and increase occupational performance for a safe return home.  Prognosis: Good  Barriers to Discharge: None  Evaluation/Treatment Tolerance: Patient tolerated treatment well  Medical Staff Made Aware: Yes  End of Session Communication: Bedside nurse  End of Session Patient Position: Bed, 3 rail up, Alarm off, not on at start of session  OT Assessment Results: Decreased ADL status, Decreased safe judgment during ADL, Decreased cognition, Decreased endurance, Decreased fine motor control, Decreased functional mobility, Decreased gross motor control, Decreased IADLs, Decreased trunk control for functional activities, Decreased upper extremity strength  Prognosis: Good  Barriers to Discharge: None  Evaluation/Treatment Tolerance: Patient tolerated treatment well  Medical Staff Made Aware: Yes  Strengths: Attitude of self, Support and attitude of living partners  Barriers to Participation:  (N/A)  Plan:  Treatment Interventions: ADL retraining, Functional transfer training, Endurance training, Cognitive reorientation, Patient/family training, Neuromuscular reeducation, Fine motor coordination activities, Compensatory technique education, UE strengthening/ROM  OT Frequency: 4 times per week  OT Discharge Recommendations: High intensity level of continued care  Equipment Recommended upon Discharge: Wheeled walker  OT Recommended Transfer Status: Moderate assist (x2 person)  OT - OK to Discharge: Yes  Treatment  Interventions: ADL retraining, Functional transfer training, Endurance training, Cognitive reorientation, Patient/family training, Neuromuscular reeducation, Fine motor coordination activities, Compensatory technique education, UE strengthening/ROM    Subjective   Current Problem:  1. Status epilepticus (Multi)  EEG      2. Mitral valve insufficiency, unspecified etiology  Transthoracic Echo (TTE) Complete    Transthoracic Echo (TTE) Complete        General:   OT Received On: 09/24/24  General  Reason for Referral: Pt presented from OSH with status epilepticus - briefly intubated and now extubated. Remains on precedex for agitation.  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 Pt was emotionally labile throughout session. Pt appeared frustrated when he required assistance donning/doffing socks as evident by raising his voice. Pt able to be redirected with verbal cues.  Precautions:  Hearing/Visual Limitations: Pt wears glasses. Hearing WFL.  Medical Precautions: Fall precautions, Seizure precautions, Cardiac precautions  Precautions Comment: SBP <180, CIWA protocol    Vital Sign (Past 2hrs)        Date/Time Vitals Session Patient Position Pulse Resp SpO2 BP MAP (mmHg)    09/24/24 1700 --  --  81  21  98 %  147/96  110                         Pain:  Pain Assessment  Pain Assessment: 0-10  0-10 (Numeric) Pain Score: 0 - No pain    Objective   Cognition:  Overall Cognitive Status: Impaired  Arousal/Alertness: Appropriate responses to stimuli  Orientation Level: Disoriented to  time  Following Commands:  (Follows ~75% one-step commands with repetition)  Safety Judgment: Decreased awareness of need for assistance  Cognition Comments: Pt was emotionally labile. Pt demonstrated decreased safety awareness and frustration when he required assistance to don socks. Pt required redirection.  Insight: Moderate  Impulsive: Mildly  Processing Speed: Delayed  Cognition Test Scores  Cognition Tests: Cognition Test Performed  SBT Test Score: Pt scored 15/28 which indicates  Impairment Consistent with Dementia (normal = 0-4; impairment consistent with dementia = 10+). Pt was not able to state the correct time and stated that he cheated by looking at the clock when instructed not to. Pt was not able to state the months in reverse order. Pt able to state the last name of the address.     Sykes Agitation Sedation Scale  Sykes Agitation Sedation Scale (RASS):  (Pt fluctuated between 0 to +2 during session)  Home Living:  Type of Home: House  Lives With: Spouse  Home Adaptive Equipment: None  Home Layout: One level, Able to live on main level with bedroom/bathroom, Laundry in basement  Home Access: Stairs to enter with rails  Entrance Stairs-Rails: Right  Entrance Stairs-Number of Steps: 2  Bathroom Shower/Tub: Tub/shower unit  Bathroom Toilet: Standard  Bathroom Equipment: None  Prior Function:  Level of [place]: Independent with ADLs and functional transfers, Independent with homemaking with ambulation  ADL Assistance: Independent  Homemaking Assistance: Independent  Ambulatory Assistance: Independent  Vocational: Retired (Pt was an )  Leisure: Pt enjoys karate  Hand Dominance: Right  Prior Function Comments: (+) drives  IADL History:  Homemaking Responsibilities: Yes  ADL:  Eating Assistance: Independent  Grooming Assistance: Minimal  Grooming Deficit: Supervision/safety, Steadying, Verbal cueing  Bathing Assistance: Maximal  Bathing Deficit: Supervision/safety, Steadying, Verbal  cueing  UE Dressing Assistance: Minimal  UE Dressing Deficit: Supervision/safety, Steadying, Verbal cueing  LE Dressing Assistance: Maximal  LE Dressing Deficit: Supervision/safety, Steadying, Verbal cueing  Toileting Assistance with Device: Maximal  Toileting Deficit: Supervison/safety, Steadying, Verbal cueing  Functional Assistance: Moderate  Functional Deficit: Supervision/safety, Steadying, Verbal cueing  Activities of Daily Living: LE Dressing  LE Dressing: Yes  Sock Level of Assistance: Maximum assistance  LE Dressing Where Assessed: Bed level  LE Dressing Comments: Pt required maximum assistance donning/doffing socks with HOB elevated. Pt required sock to be threaded over toes. Pt able to don sock over heel on the R foot when RLE was supported in the figure 4 position. Pt required verbal cues for the cross over technique. Pt demonstrated fine motor coordination deficits when attempting to manipulate socks. Pt required increased time to complete task. Pt showed increased frustration during the task so the one-handed technique was not provided.  Activity Tolerance:  Endurance: Endurance does not limit participation in activity  Early Mobility/Exercise Safety Screen: Proceed with mobilization - No exclusion criteria met  Functional Standing Tolerance:     Bed Mobility/Transfers: Bed Mobility  Bed Mobility: Yes  Bed Mobility 1  Bed Mobility 1: Supine to sitting  Level of Assistance 1: Moderate assistance, Minimum assistance  Bed Mobility Comments 1: Pt required moderate assistance for trunk and minimal assistance for BLE during supine>sit. HOB elevated. Pt required verbal cues for sequencing and hand placement.  Bed Mobility 2  Bed Mobility  2: Sitting to supine  Level of Assistance 2: Minimum assistance (x2 person assist)  Bed Mobility Comments 2: HOB flat  Bed Mobility 3  Bed Mobility 3: Rolling left  Level of Assistance 3: Contact guard  Bed Mobility Comments 3: Pt required cues for safety  awareness.    Transfers  Transfer: Yes  Transfer 1  Transfer From 1: Sit to, Stand to  Transfer to 1: Stand, Sit  Technique 1: Sit to stand, Stand to sit  Transfer Device 1:  (Arm-in-arm with BUE)  Transfer Level of Assistance 1: Minimum assistance, +2  Trials/Comments 1: Pt required verbal cues for hand placement, speed, and safety.      Functional Mobility:  Functional Mobility  Functional Mobility Performed: Yes  Functional Mobility 1  Surface 1: Level tile  Device 1:  (Arm-in-arm with BUE)  Assistance 1: Moderate assistance, Arm in arm assistance (x2 person assist)  Comments 1: Pt able to complete ~3 steps forward, ~3 steps backwards, and ~4 side steps to left with moderate assistance (x2 person) and arm-in-arm. Pt reqiured verbal cues for sequencing and hand placement.  Sitting Balance:  Static Sitting Balance  Static Sitting-Level of Assistance: Minimum assistance  Dynamic Sitting Balance  Dynamic Sitting-Level of Assistance: Moderate assistance  Dynamic Sitting-Balance: Forward lean  Standing Balance:  Static Standing Balance  Static Standing-Level of Assistance: Minimum assistance (x2 person assist)  Static Standing-Comment/Number of Minutes: Arm-in-arm with BUE  Dynamic Standing Balance  Dynamic Standing-Level of Assistance: Moderate assistance (x2 person assist)  Dynamic Standing-Balance: Forward lean, Lateral lean  Dynamic Standing-Comments: Arm-in-arm with BUE         Therapy/Activity: Therapeutic Activity  Therapeutic Activity Performed: Yes  Therapeutic Activity 1: Pt sat EOB ~10-15 minutes with minimal to moderate assistance. Pt required verbal and tactile cues for body realignment to midline. Pt able to complete functional mobility (~3 steps forward, ~3 steps backwards, and ~4 side steps to left) with moderate assistance (x2 person) and arm-in-arm. Pt reqiured verbal cues for sequencing and hand placement.       Vision:Vision - Basic Assessment  Current Vision: Wears glasses all the time   and Vision  - Complex Assessment  Tracking: WFL  Sensation:  Light Touch: No apparent deficits       Perception:  Inattention/Neglect: Appears intact  Initiation: Appears intact  Motor Planning: Appears intact  Perseveration: Not present  Coordination:  Movements are Fluid and Coordinated: No  Finger to Nose: Bradykinesia  Coordination Comment: Pt unable to perform opposition with LUE. LUE appeared to have greater deficits than RUE.   Hand Function:  Hand Function  Gross Grasp: Functional (BUE  strength 4/5)  Coordination: Impaired      Outcome Measures: [facility] Daily Activity  Putting on and taking off regular lower body clothing: A lot  Bathing (including washing, rinsing, drying): A lot  Putting on and taking off regular upper body clothing: A little  Toileting, which includes using toilet, bedpan or urinal: A lot  Taking care of personal grooming such as brushing teeth: A little  Eating Meals: None  Daily Activity - Total Score: 16         and Early Mobility/Exercise Safety Screen: Proceed with mobilization - No exclusion criteria met  ICU Mobility Scale: Walking with assistance of 2 or more people [7]    Education Documentation  Body Mechanics, taught by HORACE Patel at 9/24/2024  4:36 PM.  Learner: Patient  Readiness: Acceptance  Method: Explanation, Demonstration  Response: Needs Reinforcement, Verbalizes Understanding  Comment: Pt educated on role of OT, OT POC, and safety/sequencing/hand placement during transfers and functional mobility.    Precautions, taught by HORACE Patel at 9/24/2024  4:36 PM.  Learner: Patient  Readiness: Acceptance  Method: Explanation, Demonstration  Response: Needs Reinforcement, Verbalizes Understanding  Comment: Pt educated on role of OT, OT POC, and safety/sequencing/hand placement during transfers and functional mobility.    ADL Training, taught by HORACE Patel at 9/24/2024  4:36 PM.  Learner: Patient  Readiness: Acceptance  Method: Explanation,  Demonstration  Response: Needs Reinforcement, Verbalizes Understanding  Comment: Pt educated on role of OT, OT POC, and safety/sequencing/hand placement during transfers and functional mobility.    Education Comments  No comments found.        OP EDUCATION:       Goals:  Encounter Problems       Encounter Problems (Active)       ADLs       Patient will independently perform UB and LB bathing.   (Progressing)       Start:  09/24/24    Expected End:  10/08/24            Patient will independently complete upper body dressing donning and doffing all UE clothes  (Progressing)       Start:  09/24/24    Expected End:  10/08/24            Patient will independently complete lower body dressing donning and doffing all LE clothes.  (Progressing)       Start:  09/24/24    Expected End:  10/08/24            Patient will independently complete daily grooming tasks.  (Progressing)       Start:  09/24/24    Expected End:  10/08/24            Patient will independently complete toileting including hygiene and clothing management.  (Progressing)       Start:  09/24/24    Expected End:  10/08/24               BALANCE       Pt will tolerate static/dynamic sitting tasks >15 min independently without LOB during ADL and IADL tasks in order to return to PLOF.  (Progressing)       Start:  09/24/24    Expected End:  10/08/24            Pt will tolerate static/dynamic standing tasks >10 min with modified independence and LRAD without LOB during ADL and IADL tasks in order to return to PLOF.  (Progressing)       Start:  09/24/24    Expected End:  10/08/24               COGNITION/SAFETY       Patient will follow 100% one-step commands with no repetition to allow improved ADL performance.  (Progressing)       Start:  09/24/24    Expected End:  10/08/24            Patient will demonstrate orientation x4 with no verbal cues (Progressing)       Start:  09/24/24    Expected End:  10/08/24       ORIENTATION            MOBILITY       Patient will  perform functional mobility household distances with modified independence and LRAD in order to improve safety and functional mobility.  (Progressing)       Start:  09/24/24    Expected End:  10/08/24               TRANSFERS       Patient will independently perform bed mobility in order to improve safety and independence with mobility.  (Progressing)       Start:  09/24/24    Expected End:  10/08/24               TRANSFERS       Patient will independently complete functional transfers.  (Progressing)       Start:  09/24/24    Expected End:  10/08/24

## 2024-09-25 ENCOUNTER — APPOINTMENT (OUTPATIENT)
Dept: RADIOLOGY | Facility: HOSPITAL | Age: 74
DRG: 100 | End: 2024-09-25
Payer: MEDICARE

## 2024-09-25 LAB
ALBUMIN SERPL BCP-MCNC: 3 G/DL (ref 3.4–5)
ANION GAP SERPL CALC-SCNC: 15 MMOL/L (ref 10–20)
APTT PPP: 38 SECONDS (ref 27–38)
BACTERIA BLD CULT: NORMAL
BACTERIA BLD CULT: NORMAL
BASOPHILS # BLD AUTO: 0.04 X10*3/UL (ref 0–0.1)
BASOPHILS NFR BLD AUTO: 0.5 %
BUN SERPL-MCNC: 13 MG/DL (ref 6–23)
CA-I BLD-SCNC: 1.13 MMOL/L (ref 1.1–1.33)
CALCIUM SERPL-MCNC: 8.4 MG/DL (ref 8.6–10.6)
CHLORIDE SERPL-SCNC: 110 MMOL/L (ref 98–107)
CO2 SERPL-SCNC: 20 MMOL/L (ref 21–32)
CREAT SERPL-MCNC: 0.98 MG/DL (ref 0.5–1.3)
EGFRCR SERPLBLD CKD-EPI 2021: 82 ML/MIN/1.73M*2
EOSINOPHIL # BLD AUTO: 0.08 X10*3/UL (ref 0–0.4)
EOSINOPHIL NFR BLD AUTO: 1 %
ERYTHROCYTE [DISTWIDTH] IN BLOOD BY AUTOMATED COUNT: 13.2 % (ref 11.5–14.5)
GLUCOSE BLD MANUAL STRIP-MCNC: 108 MG/DL (ref 74–99)
GLUCOSE BLD MANUAL STRIP-MCNC: 75 MG/DL (ref 74–99)
GLUCOSE BLD MANUAL STRIP-MCNC: 77 MG/DL (ref 74–99)
GLUCOSE BLD MANUAL STRIP-MCNC: 90 MG/DL (ref 74–99)
GLUCOSE BLD MANUAL STRIP-MCNC: 95 MG/DL (ref 74–99)
GLUCOSE SERPL-MCNC: 97 MG/DL (ref 74–99)
HCT VFR BLD AUTO: 40.3 % (ref 41–52)
HGB BLD-MCNC: 14.2 G/DL (ref 13.5–17.5)
IMM GRANULOCYTES # BLD AUTO: 0.01 X10*3/UL (ref 0–0.5)
IMM GRANULOCYTES NFR BLD AUTO: 0.1 % (ref 0–0.9)
INR PPP: 1.4 (ref 0.9–1.1)
LYMPHOCYTES # BLD AUTO: 1.34 X10*3/UL (ref 0.8–3)
LYMPHOCYTES NFR BLD AUTO: 17.6 %
MAGNESIUM SERPL-MCNC: 1.98 MG/DL (ref 1.6–2.4)
MCH RBC QN AUTO: 31.6 PG (ref 26–34)
MCHC RBC AUTO-ENTMCNC: 35.2 G/DL (ref 32–36)
MCV RBC AUTO: 90 FL (ref 80–100)
MONOCYTES # BLD AUTO: 0.78 X10*3/UL (ref 0.05–0.8)
MONOCYTES NFR BLD AUTO: 10.2 %
NEUTROPHILS # BLD AUTO: 5.38 X10*3/UL (ref 1.6–5.5)
NEUTROPHILS NFR BLD AUTO: 70.6 %
NRBC BLD-RTO: 0 /100 WBCS (ref 0–0)
PHOSPHATE SERPL-MCNC: 3.8 MG/DL (ref 2.5–4.9)
PLATELET # BLD AUTO: 126 X10*3/UL (ref 150–450)
POTASSIUM SERPL-SCNC: 3.9 MMOL/L (ref 3.5–5.3)
PROTHROMBIN TIME: 15.9 SECONDS (ref 9.8–12.8)
RBC # BLD AUTO: 4.49 X10*6/UL (ref 4.5–5.9)
SODIUM SERPL-SCNC: 141 MMOL/L (ref 136–145)
WBC # BLD AUTO: 7.6 X10*3/UL (ref 4.4–11.3)

## 2024-09-25 PROCEDURE — 97116 GAIT TRAINING THERAPY: CPT | Mod: GP

## 2024-09-25 PROCEDURE — 36415 COLL VENOUS BLD VENIPUNCTURE: CPT | Performed by: NURSE PRACTITIONER

## 2024-09-25 PROCEDURE — 2500000001 HC RX 250 WO HCPCS SELF ADMINISTERED DRUGS (ALT 637 FOR MEDICARE OP): Performed by: NURSE PRACTITIONER

## 2024-09-25 PROCEDURE — 2500000004 HC RX 250 GENERAL PHARMACY W/ HCPCS (ALT 636 FOR OP/ED): Performed by: STUDENT IN AN ORGANIZED HEALTH CARE EDUCATION/TRAINING PROGRAM

## 2024-09-25 PROCEDURE — 74230 X-RAY XM SWLNG FUNCJ C+: CPT | Performed by: RADIOLOGY

## 2024-09-25 PROCEDURE — 97530 THERAPEUTIC ACTIVITIES: CPT | Mod: GP

## 2024-09-25 PROCEDURE — 92611 MOTION FLUOROSCOPY/SWALLOW: CPT | Mod: GN | Performed by: SPEECH-LANGUAGE PATHOLOGIST

## 2024-09-25 PROCEDURE — 85610 PROTHROMBIN TIME: CPT

## 2024-09-25 PROCEDURE — 85025 COMPLETE CBC W/AUTO DIFF WBC: CPT | Performed by: NURSE PRACTITIONER

## 2024-09-25 PROCEDURE — 2500000005 HC RX 250 GENERAL PHARMACY W/O HCPCS: Performed by: STUDENT IN AN ORGANIZED HEALTH CARE EDUCATION/TRAINING PROGRAM

## 2024-09-25 PROCEDURE — 74230 X-RAY XM SWLNG FUNCJ C+: CPT

## 2024-09-25 PROCEDURE — 99232 SBSQ HOSP IP/OBS MODERATE 35: CPT

## 2024-09-25 PROCEDURE — 82947 ASSAY GLUCOSE BLOOD QUANT: CPT

## 2024-09-25 PROCEDURE — 92610 EVALUATE SWALLOWING FUNCTION: CPT | Mod: GN | Performed by: SPEECH-LANGUAGE PATHOLOGIST

## 2024-09-25 PROCEDURE — 80069 RENAL FUNCTION PANEL: CPT | Performed by: NURSE PRACTITIONER

## 2024-09-25 PROCEDURE — 83735 ASSAY OF MAGNESIUM: CPT | Performed by: NURSE PRACTITIONER

## 2024-09-25 PROCEDURE — 2500000002 HC RX 250 W HCPCS SELF ADMINISTERED DRUGS (ALT 637 FOR MEDICARE OP, ALT 636 FOR OP/ED)

## 2024-09-25 PROCEDURE — 2500000004 HC RX 250 GENERAL PHARMACY W/ HCPCS (ALT 636 FOR OP/ED)

## 2024-09-25 PROCEDURE — 2500000004 HC RX 250 GENERAL PHARMACY W/ HCPCS (ALT 636 FOR OP/ED): Mod: JZ | Performed by: NURSE PRACTITIONER

## 2024-09-25 PROCEDURE — 2500000001 HC RX 250 WO HCPCS SELF ADMINISTERED DRUGS (ALT 637 FOR MEDICARE OP)

## 2024-09-25 PROCEDURE — 82330 ASSAY OF CALCIUM: CPT | Performed by: NURSE PRACTITIONER

## 2024-09-25 PROCEDURE — 92526 ORAL FUNCTION THERAPY: CPT | Mod: GN | Performed by: SPEECH-LANGUAGE PATHOLOGIST

## 2024-09-25 PROCEDURE — 1100000001 HC PRIVATE ROOM DAILY

## 2024-09-25 RX ORDER — CALCIUM GLUCONATE 20 MG/ML
2 INJECTION, SOLUTION INTRAVENOUS EVERY 6 HOURS PRN
Status: DISCONTINUED | OUTPATIENT
Start: 2024-09-25 | End: 2024-09-26

## 2024-09-25 RX ORDER — QUETIAPINE FUMARATE 25 MG/1
50 TABLET, FILM COATED ORAL NIGHTLY
Status: COMPLETED | OUTPATIENT
Start: 2024-09-25 | End: 2024-09-25

## 2024-09-25 RX ORDER — QUETIAPINE FUMARATE 25 MG/1
25 TABLET, FILM COATED ORAL NIGHTLY
Status: DISCONTINUED | OUTPATIENT
Start: 2024-09-26 | End: 2024-09-26

## 2024-09-25 RX ORDER — CLONIDINE HYDROCHLORIDE 0.1 MG/1
0.2 TABLET ORAL ONCE
Status: COMPLETED | OUTPATIENT
Start: 2024-09-26 | End: 2024-09-26

## 2024-09-25 RX ORDER — CALCIUM GLUCONATE 20 MG/ML
1 INJECTION, SOLUTION INTRAVENOUS EVERY 6 HOURS PRN
Status: DISCONTINUED | OUTPATIENT
Start: 2024-09-25 | End: 2024-09-26

## 2024-09-25 RX ORDER — LEVETIRACETAM 500 MG/1
500 TABLET ORAL 2 TIMES DAILY
Status: DISCONTINUED | OUTPATIENT
Start: 2024-09-25 | End: 2024-09-25

## 2024-09-25 RX ORDER — POLYETHYLENE GLYCOL 3350 17 G/17G
17 POWDER, FOR SOLUTION ORAL DAILY
Status: DISCONTINUED | OUTPATIENT
Start: 2024-09-26 | End: 2024-09-30 | Stop reason: HOSPADM

## 2024-09-25 RX ORDER — MULTIVIT-MIN/IRON FUM/FOLIC AC 7.5 MG-4
1 TABLET ORAL DAILY
Status: DISCONTINUED | OUTPATIENT
Start: 2024-09-26 | End: 2024-09-30 | Stop reason: HOSPADM

## 2024-09-25 RX ORDER — ACETAMINOPHEN 325 MG/1
650 TABLET ORAL EVERY 4 HOURS PRN
Status: DISCONTINUED | OUTPATIENT
Start: 2024-09-25 | End: 2024-09-30 | Stop reason: HOSPADM

## 2024-09-25 RX ORDER — TALC
6 POWDER (GRAM) TOPICAL DAILY
Status: DISCONTINUED | OUTPATIENT
Start: 2024-09-25 | End: 2024-09-30 | Stop reason: HOSPADM

## 2024-09-25 RX ORDER — CLONIDINE HYDROCHLORIDE 0.1 MG/1
0.3 TABLET ORAL EVERY 8 HOURS SCHEDULED
Status: COMPLETED | OUTPATIENT
Start: 2024-09-25 | End: 2024-09-25

## 2024-09-25 RX ORDER — LANOLIN ALCOHOL/MO/W.PET/CERES
50 CREAM (GRAM) TOPICAL DAILY
Status: DISCONTINUED | OUTPATIENT
Start: 2024-09-26 | End: 2024-09-30 | Stop reason: HOSPADM

## 2024-09-25 RX ORDER — CLONIDINE HYDROCHLORIDE 0.1 MG/1
0.1 TABLET ORAL EVERY 8 HOURS SCHEDULED
Status: DISCONTINUED | OUTPATIENT
Start: 2024-09-26 | End: 2024-09-26

## 2024-09-25 RX ORDER — QUETIAPINE FUMARATE 25 MG/1
100 TABLET, FILM COATED ORAL NIGHTLY
Status: DISCONTINUED | OUTPATIENT
Start: 2024-09-25 | End: 2024-09-25

## 2024-09-25 RX ORDER — LEVETIRACETAM 250 MG/1
500 TABLET ORAL 2 TIMES DAILY
Status: DISCONTINUED | OUTPATIENT
Start: 2024-09-25 | End: 2024-09-30 | Stop reason: HOSPADM

## 2024-09-25 RX ORDER — INSULIN LISPRO 100 [IU]/ML
0-5 INJECTION, SOLUTION INTRAVENOUS; SUBCUTANEOUS
Status: DISCONTINUED | OUTPATIENT
Start: 2024-09-25 | End: 2024-09-29

## 2024-09-25 RX ORDER — FOLIC ACID 1 MG/1
1 TABLET ORAL DAILY
Status: DISCONTINUED | OUTPATIENT
Start: 2024-09-26 | End: 2024-09-30 | Stop reason: HOSPADM

## 2024-09-25 RX ORDER — LANOLIN ALCOHOL/MO/W.PET/CERES
100 CREAM (GRAM) TOPICAL DAILY
Status: DISCONTINUED | OUTPATIENT
Start: 2024-09-26 | End: 2024-09-30 | Stop reason: HOSPADM

## 2024-09-25 RX ADMIN — BARIUM SULFATE 25 ML: 400 PASTE ORAL at 09:40

## 2024-09-25 RX ADMIN — QUETIAPINE FUMARATE 50 MG: 25 TABLET ORAL at 21:38

## 2024-09-25 RX ADMIN — PYRIDOXINE HCL TAB 50 MG 50 MG: 50 TAB at 10:43

## 2024-09-25 RX ADMIN — CLONIDINE HYDROCHLORIDE 0.3 MG: 0.1 TABLET ORAL at 21:38

## 2024-09-25 RX ADMIN — BRIVARACETAM 50 MG: 50 INJECTION, SUSPENSION INTRAVENOUS at 10:42

## 2024-09-25 RX ADMIN — CLONIDINE HYDROCHLORIDE 0.3 MG: 0.1 TABLET ORAL at 14:37

## 2024-09-25 RX ADMIN — BARIUM SULFATE 10 ML: 400 SUSPENSION ORAL at 09:39

## 2024-09-25 RX ADMIN — CALCIUM GLUCONATE 1 G: 20 INJECTION, SOLUTION INTRAVENOUS at 04:50

## 2024-09-25 RX ADMIN — Medication 1 TABLET: at 10:42

## 2024-09-25 RX ADMIN — APIXABAN 5 MG: 5 TABLET, FILM COATED ORAL at 21:39

## 2024-09-25 RX ADMIN — SODIUM CHLORIDE, POTASSIUM CHLORIDE, SODIUM LACTATE AND CALCIUM CHLORIDE 75 ML/HR: 600; 310; 30; 20 INJECTION, SOLUTION INTRAVENOUS at 04:12

## 2024-09-25 RX ADMIN — APIXABAN 5 MG: 5 TABLET, FILM COATED ORAL at 10:42

## 2024-09-25 RX ADMIN — ZONISAMIDE 400 MG: 100 SUSPENSION ORAL at 21:38

## 2024-09-25 RX ADMIN — THIAMINE HCL TAB 100 MG 100 MG: 100 TAB at 10:42

## 2024-09-25 RX ADMIN — CLONIDINE HYDROCHLORIDE 0.3 MG: 0.1 TABLET ORAL at 05:01

## 2024-09-25 RX ADMIN — Medication 6 MG: at 21:39

## 2024-09-25 RX ADMIN — FOLIC ACID 1 MG: 1 TABLET ORAL at 10:42

## 2024-09-25 RX ADMIN — BARIUM SULFATE 60 ML: 0.81 POWDER, FOR SUSPENSION ORAL at 09:39

## 2024-09-25 RX ADMIN — LEVETIRACETAM 500 MG: 500 TABLET, FILM COATED ORAL at 21:43

## 2024-09-25 SDOH — ECONOMIC STABILITY: HOUSING INSECURITY: AT ANY TIME IN THE PAST 12 MONTHS, WERE YOU HOMELESS OR LIVING IN A SHELTER (INCLUDING NOW)?: NO

## 2024-09-25 SDOH — ECONOMIC STABILITY: FOOD INSECURITY: WITHIN THE PAST 12 MONTHS, THE FOOD YOU BOUGHT JUST DIDN'T LAST AND YOU DIDN'T HAVE MONEY TO GET MORE.: NEVER TRUE

## 2024-09-25 SDOH — SOCIAL STABILITY: SOCIAL INSECURITY
WITHIN THE LAST YEAR, HAVE TO BEEN RAPED OR FORCED TO HAVE ANY KIND OF SEXUAL ACTIVITY BY YOUR PARTNER OR EX-PARTNER?: NO

## 2024-09-25 SDOH — SOCIAL STABILITY: SOCIAL INSECURITY
WITHIN THE LAST YEAR, HAVE YOU BEEN KICKED, HIT, SLAPPED, OR OTHERWISE PHYSICALLY HURT BY YOUR PARTNER OR EX-PARTNER?: NO

## 2024-09-25 SDOH — ECONOMIC STABILITY: INCOME INSECURITY: IN THE PAST 12 MONTHS, HAS THE ELECTRIC, GAS, OIL, OR WATER COMPANY THREATENED TO SHUT OFF SERVICE IN YOUR HOME?: NO

## 2024-09-25 SDOH — ECONOMIC STABILITY: HOUSING INSECURITY: IN THE PAST 12 MONTHS, HOW MANY TIMES HAVE YOU MOVED WHERE YOU WERE LIVING?: 1

## 2024-09-25 SDOH — ECONOMIC STABILITY: INCOME INSECURITY: HOW HARD IS IT FOR YOU TO PAY FOR THE VERY BASICS LIKE FOOD, HOUSING, MEDICAL CARE, AND HEATING?: NOT VERY HARD

## 2024-09-25 SDOH — ECONOMIC STABILITY: TRANSPORTATION INSECURITY
IN THE PAST 12 MONTHS, HAS THE LACK OF TRANSPORTATION KEPT YOU FROM MEDICAL APPOINTMENTS OR FROM GETTING MEDICATIONS?: NO

## 2024-09-25 SDOH — ECONOMIC STABILITY: INCOME INSECURITY: IN THE LAST 12 MONTHS, WAS THERE A TIME WHEN YOU WERE NOT ABLE TO PAY THE MORTGAGE OR RENT ON TIME?: NO

## 2024-09-25 SDOH — SOCIAL STABILITY: SOCIAL NETWORK: ARE YOU MARRIED, WIDOWED, DIVORCED, SEPARATED, NEVER MARRIED, OR LIVING WITH A PARTNER?: MARRIED

## 2024-09-25 SDOH — SOCIAL STABILITY: SOCIAL INSECURITY: WITHIN THE LAST YEAR, HAVE YOU BEEN HUMILIATED OR EMOTIONALLY ABUSED IN OTHER WAYS BY YOUR PARTNER OR EX-PARTNER?: NO

## 2024-09-25 SDOH — ECONOMIC STABILITY: TRANSPORTATION INSECURITY
IN THE PAST 12 MONTHS, HAS LACK OF TRANSPORTATION KEPT YOU FROM MEETINGS, WORK, OR FROM GETTING THINGS NEEDED FOR DAILY LIVING?: NO

## 2024-09-25 SDOH — SOCIAL STABILITY: SOCIAL INSECURITY: WITHIN THE LAST YEAR, HAVE YOU BEEN AFRAID OF YOUR PARTNER OR EX-PARTNER?: NO

## 2024-09-25 SDOH — ECONOMIC STABILITY: FOOD INSECURITY: WITHIN THE PAST 12 MONTHS, YOU WORRIED THAT YOUR FOOD WOULD RUN OUT BEFORE YOU GOT MONEY TO BUY MORE.: NEVER TRUE

## 2024-09-25 ASSESSMENT — PAIN SCALES - GENERAL
PAINLEVEL_OUTOF10: 0 - NO PAIN

## 2024-09-25 ASSESSMENT — LIFESTYLE VARIABLES
AUDITORY DISTURBANCES: NOT PRESENT
VISUAL DISTURBANCES: NOT PRESENT
NAUSEA AND VOMITING: NO NAUSEA AND NO VOMITING
HEADACHE, FULLNESS IN HEAD: NOT PRESENT
AUDITORY DISTURBANCES: NOT PRESENT
NAUSEA AND VOMITING: NO NAUSEA AND NO VOMITING
HEADACHE, FULLNESS IN HEAD: NOT PRESENT
TREMOR: NO TREMOR
AGITATION: NORMAL ACTIVITY
ORIENTATION AND CLOUDING OF SENSORIUM: ORIENTED AND CAN DO SERIAL ADDITIONS
ANXIETY: NO ANXIETY, AT EASE
ANXIETY: NO ANXIETY, AT EASE
PAROXYSMAL SWEATS: NO SWEAT VISIBLE
TOTAL SCORE: 0
AGITATION: NORMAL ACTIVITY
PAROXYSMAL SWEATS: NO SWEAT VISIBLE
AGITATION: NORMAL ACTIVITY
AUDITORY DISTURBANCES: NOT PRESENT
HEADACHE, FULLNESS IN HEAD: NOT PRESENT
TREMOR: NO TREMOR
ORIENTATION AND CLOUDING OF SENSORIUM: ORIENTED AND CAN DO SERIAL ADDITIONS
ANXIETY: NO ANXIETY, AT EASE
ORIENTATION AND CLOUDING OF SENSORIUM: ORIENTED AND CAN DO SERIAL ADDITIONS
NAUSEA AND VOMITING: NO NAUSEA AND NO VOMITING
TREMOR: NO TREMOR
VISUAL DISTURBANCES: NOT PRESENT
PAROXYSMAL SWEATS: NO SWEAT VISIBLE
VISUAL DISTURBANCES: NOT PRESENT

## 2024-09-25 ASSESSMENT — COGNITIVE AND FUNCTIONAL STATUS - GENERAL
WALKING IN HOSPITAL ROOM: A LITTLE
MOVING FROM LYING ON BACK TO SITTING ON SIDE OF FLAT BED WITH BEDRAILS: A LITTLE
MOBILITY SCORE: 17
CLIMB 3 TO 5 STEPS WITH RAILING: A LOT
TURNING FROM BACK TO SIDE WHILE IN FLAT BAD: A LITTLE
MOVING TO AND FROM BED TO CHAIR: A LITTLE
STANDING UP FROM CHAIR USING ARMS: A LITTLE

## 2024-09-25 ASSESSMENT — PAIN - FUNCTIONAL ASSESSMENT
PAIN_FUNCTIONAL_ASSESSMENT: 0-10

## 2024-09-25 NOTE — PROGRESS NOTES
Physical Therapy    Physical Therapy Treatment    Patient Name: Paddy Solis  MRN: [id number]  Today's Date: 9/25/2024  Time Calculation  Start Time: 1340  Stop Time: 1406  Time Calculation (min): 26 min       Assessment/Plan   PT Assessment  PT Assessment Results: Decreased endurance, Impaired balance, Decreased mobility, Decreased cognition, Decreased coordination, Decreased safety awareness  Rehab Prognosis: Excellent  Barriers to Discharge: Medical acuity  Evaluation/Treatment Tolerance: Patient tolerated treatment well  End of Session Communication: Bedside nurse  End of Session Patient Position: Bed, 3 rail up, Alarm on (Pt observer present in room at start and end of session.)  PT Plan  Inpatient/Swing Bed or Outpatient: Inpatient  PT Plan  Treatment/Interventions: Bed mobility, Transfer training, Gait training, Stair training, Balance training, Neuromuscular re-education, Strengthening, Endurance training, Therapeutic exercise, Therapeutic activity, Home exercise program, Postural re-education  PT Plan: Ongoing PT  PT Frequency: 5 times per week  PT Discharge Recommendations: Other (comment) (Recommend low intensity PT services at discharge with 24/7 supervision for safety. If this is unavailable, recommend high.)  Equipment Recommended upon Discharge: Wheeled walker  PT Recommended Transfer Status: Assist x1, Assistive device  PT - OK to Discharge: Yes (When medically ready)      General Visit Information:   PT  Visit  PT Received On: 09/25/24  Response to Previous Treatment: Patient with no complaints from previous session.  Reason for Referral: Pt presented from OSH with status epilepticus - briefly intubated and now extubated. Remains on precedex for agitation.  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     Subjective   Precautions:  Precautions  Hearing/Visual Limitations: Glasses full time, hearing WFL  Medical Precautions: Fall precautions, Seizure precautions  Precautions Comment: SBP <180, CIWA protocol    Vital Signs:  Vital Signs (Past 2hrs)        Date/Time Vitals Session Patient Position Pulse Resp SpO2 BP MAP (mmHg)    09/25/24 1300 --  --  89  26  99 %  135/89  105     09/25/24 1340 Pre PT  Lying  101  --  97 %  139/99  111     09/25/24 1350 During PT  Sitting  145  --  97 %  --  --     09/25/24 1405 Post PT  Lying  111  --  98 %  148/94  111                     Objective   Pain:  Pain Assessment  Pain Assessment: 0-10  0-10 (Numeric) Pain Score: 0 - No pain  Cognition:  Cognition  Overall Cognitive Status: Impaired  Arousal/Alertness: Appropriate responses to stimuli  Orientation Level: Oriented X4  Following Commands: Follows multistep commands with repetition  Safety Judgment: Decreased awareness of need for assistance  Insight: Mild  Impulsive: Mildly  Processing Speed: Within funtional limits    Lines/Tubes/Drains:  External Urinary Catheter Male (Active)   Number of days: 0       Continuous Medications/Drips:  lactated Ringer's, 75 mL/hr, Last Rate: 75 mL/hr (09/25/24 0700)        Oxygen: room air     Postural Control:   Postural Control  Postural Control: Within Functional Limits  Head Control: WFL  Trunk Control: WFL  Righting Reactions: WFL  Protective Responses: WFL  Posture Comment: WFL    Balance:   Static Sitting Balance  Static Sitting-Balance Support: Bilateral upper extremity supported, No upper extremity supported  Static Sitting-Level of Assistance: Close supervision  Static Sitting-Comment/Number of Minutes: 15 min  Dynamic Sitting Balance  Dynamic Sitting-Balance Support: Bilateral upper extremity supported, No upper  extremity supported  Dynamic Sitting-Level of Assistance: Close supervision  Dynamic Sitting-Balance: Forward lean  Dynamic Sitting-Comments: During MMT sitting at edge of bed    Static Standing Balance  Static Standing-Balance Support: Bilateral upper extremity supported  Static Standing-Level of Assistance: Contact guard  Dynamic Standing Balance  Dynamic Standing-Balance Support: Bilateral upper extremity supported  Dynamic Standing-Level of Assistance: Contact guard  Dynamic Standing-Balance: Turning, Forward lean    PT Treatments:       Therapeutic Activity  Therapeutic Activity Performed: Yes  Therapeutic Activity 1: Discussed the importance of avoiding valsalva maneuver when using toilet to prevent fluctuations in blood pressure.    Balance/Neuromuscular Re-Education  Balance/Neuromuscular Re-Education Activity Performed: Yes  Balance/Neuromuscular Re-Education Activity 1: Forward leaning to adjust sheets on bed. Verbal/tactile cues for maintianing appropriate proximity to bed and for proper posture/self monitoring activity tolerance. CGA with gait belt.    Bed Mobility  Bed Mobility: Yes  Bed Mobility 1  Bed Mobility 1: Supine to sitting, Sitting to supine  Level of Assistance 1: Close supervision  Bed Mobility Comments 1: HOB 30 deg    Ambulation/Gait Training  Ambulation/Gait Training Performed: Yes  Ambulation/Gait Training 1  Surface 1: Level tile  Device 1: Rolling walker  Gait Support Devices: Gait belt  Assistance 1: Contact guard  Quality of Gait 1: Narrow base of support, Diminished heel strike, Decreased step length, Shuffling gait (Cues for proper walker mgmt and self-monitoring activity tolerance.)  Comments/Distance (ft) 1: 15 ft + 15 ft + 15 ft in room    Transfers  Transfer: Yes  Transfer 1  Transfer From 1: Bed to  Transfer to 1: Stand  Technique 1: Sit to stand, Stand to sit  Transfer Device 1: Walker, Gait belt  Transfer Level of Assistance 1: Contact guard  Trials/Comments 1:  Verbal/tactile cues for proper hand placement and controlling speed.  Transfers 2  Transfer From 2: Toilet to  Transfer to 2: Stand  Technique 2: Sit to stand, Stand to sit  Transfer Device 2: Walker, Gait belt  Transfer Level of Assistance 2: Contact guard  Trials/Comments 2: 1 unsuccessful attempt, then pt stands fully. Verbal/tactile cues for proper hand placement and controlling speed.    Stairs  Stairs: No              Outcome Measures:  [facility] Basic Mobility  Turning from your back to your side while in a flat bed without using bedrails: A little  Moving from lying on your back to sitting on the side of a flat bed without using bedrails: A little  Moving to and from bed to chair (including a wheelchair): A little  Standing up from a chair using your arms (e.g. wheelchair or bedside chair): A little  To walk in hospital room: A little  Climbing 3-5 steps with railing: A lot  Basic Mobility - Total Score: 17                   FSS-ICU  Ambulation: Walks <50 feet with any assistance x1 or walks any distance with assistance x2 people  Rolling: Complete independence  Sitting: Supervision or set-up only  Transfer Sit-to-Stand: Supervision or set-up only  Transfer Supine-to-Sit: Supervision or set-up only  Total Score: 23    ICU Mobility Screen  Early Mobility/Exercise Safety Screen: Proceed with mobilization - No exclusion criteria met  ICU Mobility Scale: Walking with assistance of 1 person                   Education:  Education Documentation  Precautions, taught by Gwen Coronado PT at 9/25/2024  2:35 PM.  Learner: Patient  Readiness: Acceptance  Method: Explanation  Response: Verbalizes Understanding, Needs Reinforcement    Body Mechanics, taught by Gwen Coronado PT at 9/25/2024  2:35 PM.  Learner: Patient  Readiness: Acceptance  Method: Explanation  Response: Verbalizes Understanding, Needs Reinforcement    Mobility Training, taught by Gwen Coronado PT at 9/25/2024  2:35 PM.  Learner:  Patient  Readiness: Acceptance  Method: Explanation  Response: Verbalizes Understanding, Needs Reinforcement    Education Comments  No comments found.             Encounter Problems       Encounter Problems (Active)       PT Problem       Patient will perform bed mobility IND to reduce risk of developing decubitus ulcers.  (Progressing)       Start:  09/24/24    Expected End:  10/08/24            Patient will perform sit to stand and stand to sit transfers with </= close sup and LRD to increase functional strength.  (Progressing)       Start:  09/24/24    Expected End:  10/08/24            Patient will ambulate at least 150  ft. with </= close sup and LRD to improve tolerance of community distances.    (Progressing)       Start:  09/24/24    Expected End:  10/08/24            Patient will ascend and descend >/= 2 steps with railing and </= close sup to facilitate safe navigation of stairs in the home.    (Progressing)       Start:  09/24/24    Expected End:  10/08/24            Patient will perform 5x Sit to Stand test in <15 sec to indicate decreased risk of falling.  (Progressing)       Start:  09/24/24    Expected End:  10/08/24               Pain - Adult                09/25/24 at 2:35 PM   Gwen Coronado, PT   Rehab Office: [phone]

## 2024-09-25 NOTE — ASSESSMENT & PLAN NOTE
Paddy Solis is a 73 y.o. 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 His family reported recent binge drinking and he was placed on CIWA protocol and admitted to NSU on 9/21/2024 for close monitoring with EEG and optimization of AED regimen.    On admission, ZNS dosage was increased from 300 mg at bedtime (home regimen) to 400 mg at bedtime along with keppra 500 mg BID. A sitter and restraints were briefly required due agitation from presumed alcohol withdrawal. Since admission, he has had no seizure activity (clinical or vEEG). He was switched from keppra 500mg BID to briviact 50-50 on 9/24/2024 due to concern for increased agitation.    As of this morning (9/25/2024) he was no longer agitated. Most recent seizure most likely a complication of recent binge drinking and altered sleep schedule.    Epileptic Paroxysmal Episodes - Semiology:   1. Automotor > R Version > GTC w/ pos ictal psychosis** (left temporal onset)  2. Automotor seizure (D) (right temporal onset)  - Onset: April of 2021, possibly 1 year earlier (episodes of confusion)  - Frequency: **None since 05/23 prior to recent episode on 09/21  EZ: Bitemporal  Etiology: unknown   Comorbidities: HFrEF, NICM, afib on Eliquis   Prior AEDs: OXC (hyponatremia), Depakote (cognitive side effects)  Current AEDs:  mg at bedtime, keppra XR 1000mg at bedtime --> now ZNS 400mg and Briviact 50-50 while inpatient.

## 2024-09-25 NOTE — PROGRESS NOTES
"Paddy Solis is a 74 y.o. male on day 4 of admission presenting with Status epilepticus (Multi).      Subjective   On exam, Mr. Solis was pleasant, cooperative, and resting comfortably in bed with sitter at bedside. He was alert and oriented to self, location, and year. He states that he recalls being at home and then waking up in the hospital, but nothing in between. Mr. Solis expressed remorse for his drinking and stated he would like to quit. He was considerably more engaged and conversational this morning than he has been in previous interviews.    Nursing staff reported one episode of agitation last night that resolved without need for medication.        Objective     Last Recorded Vitals  Blood pressure 127/77, pulse 97, temperature 36.1 °C (97 °F), resp. rate 23, height 1.778 m (5' 10\"), weight 64.1 kg (141 lb 5 oz), SpO2 98%.    Physical Exam  Constitutional:       Appearance: He is normal weight.   HENT:      Head: Normocephalic and atraumatic.   Eyes:      Conjunctiva/sclera: Conjunctivae normal.   Cardiovascular:      Rate and Rhythm: Normal rate. Rhythm irregular.      Pulses: Normal pulses.      Comments: Per EKG on EEG 9/24/2024  Pulmonary:      Effort: Pulmonary effort is normal.   Musculoskeletal:         General: Normal range of motion.      Cervical back: Normal range of motion and neck supple.   Skin:     General: Skin is warm and dry.      Capillary Refill: Capillary refill takes less than 2 seconds.   Neurological:      General: No focal deficit present.      Mental Status: He is oriented to person, place, and time.   Psychiatric:         Mood and Affect: Mood normal.         Speech: Speech normal.         Behavior: Behavior normal.         Thought Content: Thought content normal.         Judgment: Judgment normal.         Neurological Exam  Mental Status  Awake, alert and oriented to person, place and time. Speech is normal. Language is fluent with no aphasia. Attention and concentration are " normal.    Cranial Nerves  CN II, III, IV, VI: Extraocular movements intact bilaterally. No nystagmus. Normal smooth pursuit.   Right pupil: 3 mm. Round. Reactive to light. Reactive to accommodation.   Left pupil: 3 mm. Round. Reactive to light. Reactive to accommodation.  Relative afferent pupillary defect absent.  CN V:  Right: Facial sensation is normal. Jaw strength is normal.  Left: Facial sensation is normal on the left. Jaw strength is normal.  CN VII: Full and symmetric facial movement.    Motor  Normal muscle bulk throughout. No fasciculations present. Normal muscle tone. No abnormal involuntary movements. Strength is 5/5 throughout all four extremities.    Sensory  Light touch is normal in upper and lower extremities.     Relevant Results  Scheduled medications  apixaban, 5 mg, nasogastric tube, BID  brivaracetam, 50 mg, intravenous, q12h PIPO  [Held by provider] carvedilol, 25 mg, nasogastric tube, BID  cloNIDine, 0.3 mg, nasogastric tube, q8h PIPO  folic acid, 1 mg, nasogastric tube, Daily  insulin lispro, 0-5 Units, subcutaneous, TID  melatonin, 6 mg, nasogastric tube, Daily  multivitamin with minerals, 1 tablet, nasogastric tube, Daily  polyethylene glycol, 17 g, nasogastric tube, Daily  pyridoxine, 50 mg, nasogastric tube, Daily  QUEtiapine, 100 mg, nasogastric tube, Nightly  thiamine, 100 mg, nasogastric tube, Daily  zonisamide, 400 mg, orogastric tube, Nightly      Continuous medications  lactated Ringer's, 75 mL/hr, Last Rate: 75 mL/hr (09/25/24 0600)      PRN medications  PRN medications: acetaminophen, calcium gluconate, calcium gluconate, dextrose, dextrose, glucagon, glucagon, LORazepam **OR** LORazepam **OR** LORazepam, magnesium sulfate, magnesium sulfate, potassium chloride CR **OR** potassium chloride, potassium chloride CR **OR** potassium chloride, potassium chloride    Results for orders placed or performed during the hospital encounter of 09/21/24 (from the past 24 hour(s))   POCT GLUCOSE    Result Value Ref Range    POCT Glucose 90 74 - 99 mg/dL   POCT GLUCOSE   Result Value Ref Range    POCT Glucose 93 74 - 99 mg/dL   POCT GLUCOSE   Result Value Ref Range    POCT Glucose 101 (H) 74 - 99 mg/dL   POCT GLUCOSE   Result Value Ref Range    POCT Glucose 113 (H) 74 - 99 mg/dL   POCT GLUCOSE   Result Value Ref Range    POCT Glucose 95 74 - 99 mg/dL   Coagulation Screen   Result Value Ref Range    Protime 15.9 (H) 9.8 - 12.8 seconds    INR 1.4 (H) 0.9 - 1.1    aPTT 38 27 - 38 seconds   Calcium, Ionized   Result Value Ref Range    POCT Calcium, Ionized 1.13 1.1 - 1.33 mmol/L   CBC and Auto Differential   Result Value Ref Range    WBC 7.6 4.4 - 11.3 x10*3/uL    nRBC 0.0 0.0 - 0.0 /100 WBCs    RBC 4.49 (L) 4.50 - 5.90 x10*6/uL    Hemoglobin 14.2 13.5 - 17.5 g/dL    Hematocrit 40.3 (L) 41.0 - 52.0 %    MCV 90 80 - 100 fL    MCH 31.6 26.0 - 34.0 pg    MCHC 35.2 32.0 - 36.0 g/dL    RDW 13.2 11.5 - 14.5 %    Platelets 126 (L) 150 - 450 x10*3/uL    Neutrophils % 70.6 40.0 - 80.0 %    Immature Granulocytes %, Automated 0.1 0.0 - 0.9 %    Lymphocytes % 17.6 13.0 - 44.0 %    Monocytes % 10.2 2.0 - 10.0 %    Eosinophils % 1.0 0.0 - 6.0 %    Basophils % 0.5 0.0 - 2.0 %    Neutrophils Absolute 5.38 1.60 - 5.50 x10*3/uL    Immature Granulocytes Absolute, Automated 0.01 0.00 - 0.50 x10*3/uL    Lymphocytes Absolute 1.34 0.80 - 3.00 x10*3/uL    Monocytes Absolute 0.78 0.05 - 0.80 x10*3/uL    Eosinophils Absolute 0.08 0.00 - 0.40 x10*3/uL    Basophils Absolute 0.04 0.00 - 0.10 x10*3/uL   Renal Function Panel   Result Value Ref Range    Glucose 97 74 - 99 mg/dL    Sodium 141 136 - 145 mmol/L    Potassium 3.9 3.5 - 5.3 mmol/L    Chloride 110 (H) 98 - 107 mmol/L    Bicarbonate 20 (L) 21 - 32 mmol/L    Anion Gap 15 10 - 20 mmol/L    Urea Nitrogen 13 6 - 23 mg/dL    Creatinine 0.98 0.50 - 1.30 mg/dL    eGFR 82 >60 mL/min/1.73m*2    Calcium 8.4 (L) 8.6 - 10.6 mg/dL    Phosphorus 3.8 2.5 - 4.9 mg/dL    Albumin 3.0 (L) 3.4 - 5.0 g/dL    Magnesium   Result Value Ref Range    Magnesium 1.98 1.60 - 2.40 mg/dL   POCT GLUCOSE   Result Value Ref Range    POCT Glucose 75 74 - 99 mg/dL     EEG 9/24/2024  Impression: This cvEEG is indicative of a focal bitemporal (right > left) epileptogenicity. No seizures or events are seen.              Elmo Coma Scale  Best Eye Response: Spontaneous  Best Verbal Response: Oriented  Best Motor Response: Follows commands  Elmo Coma Scale Score: 15                               Assessment/Plan      Assessment & Plan  Status epilepticus (Multi)    Paddy Solis is a 73 y.o. 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 His family reported recent binge drinking and he was placed on CIWA protocol and admitted to NSU on 9/21/2024 for close monitoring with EEG and optimization of AED regimen.    On admission, ZNS dosage was increased from 300 mg at bedtime (home regimen) to 400 mg at bedtime along with keppra 500 mg BID. A sitter and restraints were briefly required due agitation from presumed alcohol withdrawal. Since admission, he has had no seizure activity (clinical or vEEG). He was switched from keppra 500mg BID to briviact 50-50 on 9/24/2024 due to concern for increased agitation.    As of this morning (9/25/2024) he was no longer agitated. Most recent seizure most likely a complication of recent binge drinking and altered sleep schedule.    Epileptic Paroxysmal Episodes - Semiology:   1. Automotor > R Version > GTC w/ pos ictal psychosis** (left temporal onset)  2. Automotor seizure (D) (right temporal onset)  - Onset: April of 2021, possibly 1 year earlier (episodes of confusion)  - Frequency: **None since 05/23 prior to recent episode on 09/21  EZ: Bitemporal  Etiology: unknown   Comorbidities: HFrEF, NICM, afib on Eliquis   Prior AEDs: OXC  (hyponatremia), Depakote (cognitive side effects)  Current AEDs:  mg at bedtime, keppra XR 1000mg at bedtime --> now ZNS 400mg and Briviact 50-50 while inpatient.    NEURO:   #Breakthrough Seizures  #CF status epilepticus  #Bitemporal epilepsy   Assessment:  - CTH: no acute intracranial abnormality  - S/p 2g Keppra load (~0100, 9/21/24)  - EEG read 09/22: bitemporal epileptogenicity (frequent spikes) but no seizures.  - EEG read 09/23: bitemporal epileptogenicity - no seizures or events seen.  - EEG read 09/24: bitemporal epileptogenicity - no seizures or events seen.  - No reported clinical seizures per nursing staff     Plan:  - Transfer to regular unit; ICU no longer medically necessary  - Wean off of clonidine  - Continue Zonisamide 400mg at bedtime  - Pending swallow study results, discontinue Briviact 50-50 and transition back to home dose of Keppra XR (1000 mg at bedtime)  - Follow medication levels and UA/CXR  - Discontinue CIWA protocol (last drink 9/19);   - Discontinue sitter; nursing staff reports decreased agitation  - PT/OT/SLP     CARDIOVASCULAR:   #Afib  #HFrEF  Assessment:  - Repeat TTE 9/24 with improved EF to 50-55% from 35-40%, severe LA dilatation.     Plan:  - Continue tele  - BP goal: Normotension  - Continue home eliquis 5mg BID  - Consider resuming coreg (25 mg tablet) given recent high BPs      RESPIRATORY:   #Hypoxic Respiratory Failure s/p intubation, now extubated 9/23.  Assessment:  - CXR with no c/f pneumonia, fluid overload    Plan:  -Continuous pulse oximetry  - O2 PRN to maintain SPO2 > 94%, wean as tolerated.      RENAL/:  #MAYTE   Assessment:  - Baseline BUN/Cr: 18/1.2  Urea Nitrogen   Date/Time Value Ref Range Status   09/25/2024 02:56 AM 13 6 - 23 mg/dL Final   09/24/2024 01:57 AM 9 6 - 23 mg/dL Final     Creatinine   Date/Time Value Ref Range Status   09/25/2024 02:56 AM 0.98 0.50 - 1.30 mg/dL Final   09/24/2024 01:57 AM 0.78 0.50 - 1.30 mg/dL Final      Plan:  -  Continue to monitor with daily RFP    FEN/GI:  #MAYTE  Assessment:  - Currently has dobhoff in place for tube feeds  - Last BM Date: PTA    Plan:  - Modified barium swallow study  - Pending swallow study, remove dobhoff and progress diet as tolerated  - Monitor and replace electrolytes per protocol  - IVF: NS @ 75 mL/hr  - Bowel Regimen: Miralax QD      ENDOCRINE:  #MAYTE  Assessment:    POCT Glucose   Date/Time Value Ref Range Status   09/25/2024 07:58 AM 77 74 - 99 mg/dL Final   09/25/2024 04:27 AM 75 74 - 99 mg/dL Final   09/25/2024 12:14 AM 95 74 - 99 mg/dL Final   09/24/2024 07:20  (H) 74 - 99 mg/dL Final   09/24/2024 05:09  (H) 74 - 99 mg/dL Final     Glucose   Date/Time Value Ref Range Status   09/25/2024 02:56 AM 97 74 - 99 mg/dL Final   09/24/2024 01:57  (H) 74 - 99 mg/dL Final   09/23/2024 10:26 AM 76 74 - 99 mg/dL Final   09/23/2024 12:29 AM 83 74 - 99 mg/dL Final   09/22/2024 01:09  (H) 74 - 99 mg/dL Final     Plan:  - Accuchecks & ISS Q6H       HEMATOLOGY:  #MAYTE  Assessment:  - Baseline Hgb: 15  - Baseline Plts: 130  Hemoglobin   Date/Time Value Ref Range Status   09/25/2024 02:56 AM 14.2 13.5 - 17.5 g/dL Final   09/24/2024 01:57 AM 12.6 (L) 13.5 - 17.5 g/dL Final   09/23/2024 12:29 AM 12.0 (L) 13.5 - 17.5 g/dL Final   09/22/2024 01:09 AM 12.9 (L) 13.5 - 17.5 g/dL Final   09/21/2024 09:48 AM 14.9 13.5 - 17.5 g/dL Final     Hematocrit   Date/Time Value Ref Range Status   09/25/2024 02:56 AM 40.3 (L) 41.0 - 52.0 % Final   09/24/2024 01:57 AM 38.3 (L) 41.0 - 52.0 % Final   09/23/2024 12:29 AM 34.9 (L) 41.0 - 52.0 % Final   09/22/2024 01:09 AM 37.0 (L) 41.0 - 52.0 % Final   09/21/2024 09:48 AM 43.9 41.0 - 52.0 % Final     Platelets   Date/Time Value Ref Range Status   09/25/2024 02:56  (L) 150 - 450 x10*3/uL Final   09/24/2024 01:57 AM 96 (L) 150 - 450 x10*3/uL Final   09/23/2024 12:29 AM 96 (L) 150 - 450 x10*3/uL Final   09/22/2024 01:09  (L) 150 - 450 x10*3/uL Final    09/21/2024 09:48 AM 83 (L) 150 - 450 x10*3/uL Final     Plan:  - Continue to monitor with daily CBC and coag panel.      INFECTIOUS DISEASE:  #Concern for Sepsis  Assessment:   - Hypotensive on arrival to ED in [place]; continues to be hypotensive in NSU  - Blood cultures (9/20/2024) taken and started on vanc/zosyn.   - MRSA nares negative  - UA positive for 500 LE, blood, WBCs, RBCs, and protein, urine culture shows no growth to date  - Blood culture (preliminary results) indicate no growth at 3 days (as of 9/24/2024).  - Vanc D/C'd on 9/22; zosyn discontinued 9/23.    Plan:  - Continue to monitor for signs and symptoms of infection      MUSCULOSKELETAL:  #MAYTE  Plan:  - Encourage activity as tolerated  - PT/OT reevaluation to potentially discharge home      SKIN:  #MAYTE  Plan:  - Turns and skin care per unit protocol.      ACCESS:  -PIV      PROPHYLAXIS:  - Patient wearing sequential compression stockings.   - Continue Eliquis 5 mg tablet PO.          RIK DIAZ - MS3        Patient seen and examined under my supervision. Documentation reviewed and edited were necessary.      Jose Mancini, DO PGY-1

## 2024-09-25 NOTE — PROCEDURES
Speech-Language Pathology  Adult Inpatient Modified Barium Swallow Study (MBSS)    Patient Name: Paddy Solis  MRN: [id number]  Today's Date: 9/25/2024   Start Time: 855  Stop Time: 920  Time Calculation (min): 26    Initial MBSS: Yes    Respiratory Status: R/A    History of Present Illness:   Paddy Solis is a 73 y.o. 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 as a transfer from [place] with c/f status epilepticus. Admitted to NSU on 9/21/2024 for close monitoring with EEG and optimize AED regimen as needed.     Impression:   Modified Barium Swallow Study completed. Verbal consent obtained. Pt given thin, mildly thick liquids, puree and solid textures.  Pharyngeal dysphagia evident with trace penetration of thin/mildly thick liquids.     Pt exhibited functional mastication of solids and manipulation of all textures posteriorly. Adequate oral containment of each bolus. Initiation of pharyngeal swallow as each bolus reached the ramus of the mandible. Decrease base of tongue retraction, anterior shift of the larynx and posterior pharyngeal wall stripping wave. This resulted in post swallow residue along the posterior pharyngeal wall and within the valleculae which increased with heavier viscosities. Spontaneous repeat swallow aided in clearing majority of valleculae and posterior pharyngeal wall residue. Lordosis and presence of cortrak further impacted the swallow as it narrowed the pharyngeal lumen. Penetration during the swallow to the level of the TVF with thin and mildly thick liquids with single sips due to mistiming of laryngeal vestibular closure. Trace/min aspiration along the anterior tracheal wall occurred with multiple swallows of thin or when use as a liquid wash. A chin tuck and use of single sips essentially limited penetration to a transient event.  Large osteophyte at level C6-7 did not  impinge bolus flow.     Patient ready to resume diet using safe swallow strategies as stated below. Discussed results and recommendations with RN and MD.      [place]:  Thin: 2 - Material enters airway, remains above the folds, ejected from airway.  [place]: 2 - Material enters airway, remains above the folds, ejected from airway.  [place]: N/A  Puree: 1 - Material does not enter airway.   Solids: 1 - Material does not enter airway.     Recommendations:  Regular/Thin  Upright for all PO intake  Remain upright for 60 min after eating  Small bites/sips  Straws ok  Slow rate of consumption  Medication crushed in purees  Chin tuck while swallowing    Goal:   Pt will tolerate least restrictive diet without s/s aspiration, respiratory compromise, or overt difficulty throughout admission.               Start: 09/24/24, End: 10/5/24               Status: progressing  Patient/Family will verbalize/demonstrate comprehension of dysphagia education, strategies, recommendations and POC with 80% accuracy independently.                Start: 09/25/24, End: 10/5/24               Status: progressing     Dysphagia Therapy:   Reviewed results of MBSS with visualization of study. Pt instructed to tuck chin for all swallows taking single sips of liquids via straw. Through talk back method pt re-verbalized and demonstrate safe swallow strategies with min verbal cueing. Answered all questions, pt anxious to get dobhoff removed.     Plan:  SLP Services Indicated: Yes  Frequency: 2x week  Discussed POC with patient  SLP - OK to Discharge    Pain:   0-10  0 = No pain.     Inpatient Education:  Extensive education provided to patient regarding current swallow function, recommendations/results, and POC.      Consultations/Referrals/Coordination of Services:   N/A

## 2024-09-25 NOTE — CARE PLAN
"The patient's goals for the shift include  \" I just want to sleep\"    The clinical goals for the shift include improved mentation with decreased agitation with ability to wean precedex      Problem: Skin  Goal: Decreased wound size/increased tissue granulation at next dressing change  Outcome: Progressing  Flowsheets (Taken 9/24/2024 2225)  Decreased wound size/increased tissue granulation at next dressing change:   Protective dressings over bony prominences   Promote sleep for wound healing  Goal: Participates in plan/prevention/treatment measures  Outcome: Progressing  Flowsheets (Taken 9/24/2024 2225)  Participates in plan/prevention/treatment measures:   Elevate heels   Increase activity/out of bed for meals   Discuss with provider PT/OT consult  Goal: Prevent/manage excess moisture  Outcome: Progressing  Flowsheets (Taken 9/24/2024 2225)  Prevent/manage excess moisture:   Monitor for/manage infection if present   Moisturize dry skin   Cleanse incontinence/protect with barrier cream  Goal: Prevent/minimize sheer/friction injuries  Outcome: Progressing  Flowsheets (Taken 9/24/2024 2225)  Prevent/minimize sheer/friction injuries:   HOB 30 degrees or less   Turn/reposition every 2 hours/use positioning/transfer devices   Increase activity/out of bed for meals   Use pull sheet  Goal: Promote/optimize nutrition  Outcome: Progressing  Flowsheets (Taken 9/24/2024 2225)  Promote/optimize nutrition: Discuss with provider if NPO > 2 days  Goal: Promote skin healing  Outcome: Progressing  Flowsheets (Taken 9/24/2024 2225)  Promote skin healing:   Assess skin/pad under line(s)/device(s)   Protective dressings over bony prominences   Turn/reposition every 2 hours/use positioning/transfer devices     Problem: Pain - Adult  Goal: Verbalizes/displays adequate comfort level or baseline comfort level  Outcome: Progressing     Problem: Safety - Adult  Goal: Free from fall injury  Outcome: Progressing     Problem: Discharge " Planning  Goal: Discharge to home or other facility with appropriate resources  Outcome: Progressing     Problem: Chronic Conditions and Co-morbidities  Goal: Patient's chronic conditions and co-morbidity symptoms are monitored and maintained or improved  Outcome: Progressing     Problem: Fall/Injury  Goal: Not fall by end of shift  Outcome: Progressing  Goal: Be free from injury by end of the shift  Outcome: Progressing  Goal: Verbalize understanding of personal risk factors for fall in the hospital  Outcome: Progressing  Goal: Verbalize understanding of risk factor reduction measures to prevent injury from fall in the home  Outcome: Progressing  Goal: Use assistive devices by end of the shift  Outcome: Progressing  Goal: Pace activities to prevent fatigue by end of the shift  Outcome: Progressing     Problem: Indwelling Catheter Maintenance  Goal: I will have no complications from indwelling catheter  Outcome: Progressing

## 2024-09-25 NOTE — PROGRESS NOTES
09/25/24 1632   Discharge Planning   Living Arrangements Spouse/significant other   Support Systems Children;Spouse/significant other   Assistance Needed Per the patient he was indep prior to admission   Type of Residence Private residence   Do you have animals or pets at home? No   Who is requesting discharge planning? Provider   Home or Post Acute Services Post acute facilities (Rehab/SNF/etc)  (Acute rehab is being recommended)     SW met with the patient this afternoon to complete the SDOH and discharge assessment. Per the assessment with the patient there are no barriers to dc that were identified. Per the patient he was independent prior to admission. He has no DME at home. He retired in 2017 as an .   He has no issues with affording his medications. His wife takes him to his appointments. His PCP is Dr Darion Leon in [facility].   I informed the patient that rehab is recommending Acute Rehab. The patient declined. He stated, he has a gym in his basement that he can use. He prefers to be at home.  He was agreeable to home care. I explained that we feel that he needs 24/7 supervision until he is stronger. He does not agree.   I later received a message from the team informing me that the patient is medically ready to be dc. I called his wife Nadeen. She will be here on tomorrow 9/26 to speak with her . I will be met her in his room around 10am. Gal will follow up with the primary SW and Medical team after I have spoken with the patient and his wife.

## 2024-09-25 NOTE — NURSING NOTE
Abundio has left at this time and was advised that there will not be another staff member available until morning. This nurse will monitor patient closely.

## 2024-09-26 PROBLEM — G40.901 STATUS EPILEPTICUS (MULTI): Chronic | Status: ACTIVE | Noted: 2024-09-21

## 2024-09-26 LAB
ALBUMIN SERPL BCP-MCNC: 3.5 G/DL (ref 3.4–5)
ANION GAP SERPL CALC-SCNC: 15 MMOL/L (ref 10–20)
BASOPHILS # BLD AUTO: 0.03 X10*3/UL (ref 0–0.1)
BASOPHILS NFR BLD AUTO: 0.4 %
BUN SERPL-MCNC: 13 MG/DL (ref 6–23)
CALCIUM SERPL-MCNC: 8.7 MG/DL (ref 8.6–10.6)
CHLORIDE SERPL-SCNC: 106 MMOL/L (ref 98–107)
CO2 SERPL-SCNC: 22 MMOL/L (ref 21–32)
CREAT SERPL-MCNC: 0.89 MG/DL (ref 0.5–1.3)
EGFRCR SERPLBLD CKD-EPI 2021: >90 ML/MIN/1.73M*2
EOSINOPHIL # BLD AUTO: 0.04 X10*3/UL (ref 0–0.4)
EOSINOPHIL NFR BLD AUTO: 0.5 %
ERYTHROCYTE [DISTWIDTH] IN BLOOD BY AUTOMATED COUNT: 13 % (ref 11.5–14.5)
GLUCOSE BLD MANUAL STRIP-MCNC: 110 MG/DL (ref 74–99)
GLUCOSE BLD MANUAL STRIP-MCNC: 117 MG/DL (ref 74–99)
GLUCOSE SERPL-MCNC: 101 MG/DL (ref 74–99)
HCT VFR BLD AUTO: 45.2 % (ref 41–52)
HGB BLD-MCNC: 15.4 G/DL (ref 13.5–17.5)
IMM GRANULOCYTES # BLD AUTO: 0.03 X10*3/UL (ref 0–0.5)
IMM GRANULOCYTES NFR BLD AUTO: 0.4 % (ref 0–0.9)
LYMPHOCYTES # BLD AUTO: 1.42 X10*3/UL (ref 0.8–3)
LYMPHOCYTES NFR BLD AUTO: 18 %
MCH RBC QN AUTO: 32 PG (ref 26–34)
MCHC RBC AUTO-ENTMCNC: 34.1 G/DL (ref 32–36)
MCV RBC AUTO: 94 FL (ref 80–100)
MONOCYTES # BLD AUTO: 0.79 X10*3/UL (ref 0.05–0.8)
MONOCYTES NFR BLD AUTO: 10 %
NEUTROPHILS # BLD AUTO: 5.58 X10*3/UL (ref 1.6–5.5)
NEUTROPHILS NFR BLD AUTO: 70.7 %
NRBC BLD-RTO: 0 /100 WBCS (ref 0–0)
PHOSPHATE SERPL-MCNC: 3.5 MG/DL (ref 2.5–4.9)
PLATELET # BLD AUTO: 166 X10*3/UL (ref 150–450)
POTASSIUM SERPL-SCNC: 3.4 MMOL/L (ref 3.5–5.3)
RBC # BLD AUTO: 4.81 X10*6/UL (ref 4.5–5.9)
SODIUM SERPL-SCNC: 140 MMOL/L (ref 136–145)
WBC # BLD AUTO: 7.9 X10*3/UL (ref 4.4–11.3)

## 2024-09-26 PROCEDURE — 36415 COLL VENOUS BLD VENIPUNCTURE: CPT

## 2024-09-26 PROCEDURE — 82947 ASSAY GLUCOSE BLOOD QUANT: CPT

## 2024-09-26 PROCEDURE — 99232 SBSQ HOSP IP/OBS MODERATE 35: CPT

## 2024-09-26 PROCEDURE — 2500000001 HC RX 250 WO HCPCS SELF ADMINISTERED DRUGS (ALT 637 FOR MEDICARE OP)

## 2024-09-26 PROCEDURE — 80069 RENAL FUNCTION PANEL: CPT

## 2024-09-26 PROCEDURE — 2500000004 HC RX 250 GENERAL PHARMACY W/ HCPCS (ALT 636 FOR OP/ED)

## 2024-09-26 PROCEDURE — 92526 ORAL FUNCTION THERAPY: CPT | Mod: GN | Performed by: SPEECH-LANGUAGE PATHOLOGIST

## 2024-09-26 PROCEDURE — 1100000001 HC PRIVATE ROOM DAILY

## 2024-09-26 PROCEDURE — 85025 COMPLETE CBC W/AUTO DIFF WBC: CPT

## 2024-09-26 PROCEDURE — 2500000002 HC RX 250 W HCPCS SELF ADMINISTERED DRUGS (ALT 637 FOR MEDICARE OP, ALT 636 FOR OP/ED)

## 2024-09-26 RX ORDER — QUETIAPINE FUMARATE 25 MG/1
25 TABLET, FILM COATED ORAL NIGHTLY
Qty: 1 TABLET | Refills: 0 | OUTPATIENT
Start: 2024-09-26 | End: 2024-09-27

## 2024-09-26 RX ORDER — QUETIAPINE FUMARATE 25 MG/1
25 TABLET, FILM COATED ORAL NIGHTLY
Status: COMPLETED | OUTPATIENT
Start: 2024-09-26 | End: 2024-09-26

## 2024-09-26 RX ORDER — LEVETIRACETAM 500 MG/1
500 TABLET ORAL 2 TIMES DAILY
Qty: 60 TABLET | Refills: 1 | OUTPATIENT
Start: 2024-09-26 | End: 2024-11-25

## 2024-09-26 RX ORDER — CLONIDINE HYDROCHLORIDE 0.1 MG/1
0.1 TABLET ORAL EVERY 8 HOURS SCHEDULED
Status: COMPLETED | OUTPATIENT
Start: 2024-09-26 | End: 2024-09-26

## 2024-09-26 RX ORDER — ZONISAMIDE 100 MG/1
400 CAPSULE ORAL DAILY
OUTPATIENT
Start: 2024-09-26

## 2024-09-26 RX ORDER — ZONISAMIDE 100 MG/1
400 CAPSULE ORAL NIGHTLY
Status: DISCONTINUED | OUTPATIENT
Start: 2024-09-26 | End: 2024-09-30 | Stop reason: HOSPADM

## 2024-09-26 RX ADMIN — QUETIAPINE FUMARATE 25 MG: 25 TABLET ORAL at 21:17

## 2024-09-26 RX ADMIN — ZONISAMIDE 400 MG: 100 CAPSULE ORAL at 21:17

## 2024-09-26 RX ADMIN — CLONIDINE HYDROCHLORIDE 0.1 MG: 0.1 TABLET ORAL at 13:24

## 2024-09-26 RX ADMIN — LEVETIRACETAM 500 MG: 500 TABLET, FILM COATED ORAL at 10:59

## 2024-09-26 RX ADMIN — APIXABAN 5 MG: 5 TABLET, FILM COATED ORAL at 21:17

## 2024-09-26 RX ADMIN — PYRIDOXINE HCL TAB 50 MG 50 MG: 50 TAB at 08:50

## 2024-09-26 RX ADMIN — CLONIDINE HYDROCHLORIDE 0.2 MG: 0.1 TABLET ORAL at 05:28

## 2024-09-26 RX ADMIN — FOLIC ACID 1 MG: 1 TABLET ORAL at 08:50

## 2024-09-26 RX ADMIN — CLONIDINE HYDROCHLORIDE 0.1 MG: 0.1 TABLET ORAL at 21:17

## 2024-09-26 RX ADMIN — APIXABAN 5 MG: 5 TABLET, FILM COATED ORAL at 08:51

## 2024-09-26 RX ADMIN — CARVEDILOL 25 MG: 25 TABLET, FILM COATED ORAL at 21:17

## 2024-09-26 RX ADMIN — Medication 6 MG: at 21:19

## 2024-09-26 RX ADMIN — SODIUM CHLORIDE, POTASSIUM CHLORIDE, SODIUM LACTATE AND CALCIUM CHLORIDE 75 ML/HR: 600; 310; 30; 20 INJECTION, SOLUTION INTRAVENOUS at 05:29

## 2024-09-26 RX ADMIN — Medication 1 TABLET: at 08:50

## 2024-09-26 RX ADMIN — THIAMINE HCL TAB 100 MG 100 MG: 100 TAB at 08:50

## 2024-09-26 ASSESSMENT — COGNITIVE AND FUNCTIONAL STATUS - GENERAL
CLIMB 3 TO 5 STEPS WITH RAILING: A LITTLE
DRESSING REGULAR LOWER BODY CLOTHING: A LITTLE
MOBILITY SCORE: 21
HELP NEEDED FOR BATHING: A LITTLE
DRESSING REGULAR LOWER BODY CLOTHING: A LITTLE
HELP NEEDED FOR BATHING: A LITTLE
DAILY ACTIVITIY SCORE: 22
STANDING UP FROM CHAIR USING ARMS: A LITTLE
DAILY ACTIVITIY SCORE: 22
MOBILITY SCORE: 23
WALKING IN HOSPITAL ROOM: A LITTLE
CLIMB 3 TO 5 STEPS WITH RAILING: A LITTLE

## 2024-09-26 ASSESSMENT — PAIN - FUNCTIONAL ASSESSMENT
PAIN_FUNCTIONAL_ASSESSMENT: 0-10

## 2024-09-26 ASSESSMENT — PAIN SCALES - GENERAL
PAINLEVEL_OUTOF10: 0 - NO PAIN
PAINLEVEL_OUTOF10: 0 - NO PAIN

## 2024-09-26 NOTE — PROGRESS NOTES
9/26/24 @1032 Transitional Care Coordination Note  Called into pt's room to introduce myself, role and to discuss discharge planning. I spoke with the pt and offered him a list of choices for AR. Pt states he is agreeable to AR and he would like a list sent to his wife. Pt states his wife stepped out of the room for a second but I could call her. Called pt's wife Nadeen [phone] introduced myself, role and discussed discharge planning with her. I offered her a choice list for AR and she stated their FOC is  [facility] and she would also like an AR list sent to her cellphone. I sent a AR list to her cellphone [phone]. Referral sent to  [facility]. SW aware, team aware. Will continue to follow.     9/26/24 @1155 addendum    [facility] can accept pending precert. Requested [facility] [place] team (Consuelo Stokes, Ewa Hoover and Tiffanie Quintero) to initiate precert. Will continue to follow.     9/26/24 @1222 addendum   Tiffanie Quintero working on precert. Placed a 7000 in chart and made team aware that attending signature is needed. Will continue to follow.     9/26/24 @1235 addedum  Auth initiated REF #: [id number], facility made aware. Will continue to follow.

## 2024-09-26 NOTE — CARE PLAN
Problem: Skin  Goal: Decreased wound size/increased tissue granulation at next dressing change  Flowsheets (Taken 9/26/2024 0107)  Decreased wound size/increased tissue granulation at next dressing change:   Promote sleep for wound healing   Protective dressings over bony prominences   Utilize specialty bed per algorithm  Goal: Participates in plan/prevention/treatment measures  Flowsheets (Taken 9/26/2024 0107)  Participates in plan/prevention/treatment measures:   Discuss with provider PT/OT consult   Elevate heels   Increase activity/out of bed for meals  Goal: Prevent/manage excess moisture  Flowsheets (Taken 9/26/2024 0107)  Prevent/manage excess moisture:   Cleanse incontinence/protect with barrier cream   Monitor for/manage infection if present   Use wicking fabric (obtain order)   Follow provider orders for dressing changes   Moisturize dry skin  Goal: Prevent/minimize sheer/friction injuries  Flowsheets (Taken 9/26/2024 0107)  Prevent/minimize sheer/friction injuries:   Increase activity/out of bed for meals   Use pull sheet   Turn/reposition every 2 hours/use positioning/transfer devices   Utilize specialty bed per algorithm   Complete micro-shifts as needed if patient unable. Adjust patient position to relieve pressure points, not a full turn   HOB 30 degrees or less  Goal: Promote/optimize nutrition  Flowsheets (Taken 9/26/2024 0107)  Promote/optimize nutrition:   Assist with feeding   Monitor/record intake including meals   Consume > 50% meals/supplements   Offer water/supplements/favorite foods   Discuss with provider if NPO > 2 days   Reassess MST if dietician not consulted  Goal: Promote skin healing  Flowsheets (Taken 9/26/2024 0107)  Promote skin healing:   Assess skin/pad under line(s)/device(s)   Protective dressings over bony prominences   Turn/reposition every 2 hours/use positioning/transfer devices   Ensure correct size (line/device) and apply per  instructions   Rotate device  position/do not position patient on device   The patient's goals for the shift include      The clinical goals for the shift include improved mentation with decreased agitation with ability to wean precedex    Over the shift, the patient did not make progress toward the following goals. Barriers to progression include . Recommendations to address these barriers include .

## 2024-09-26 NOTE — PROGRESS NOTES
"Paddy Solis is a 74 y.o. male on day 5 of admission presenting with Status epilepticus (Multi).      Subjective   On interview, Mr. Solis was pleasant, cooperative, and resting comfortably in bed. His wife was present for the duration of the interview. His wife noted that over the past year, he has had increased anxiety and a disordered sleep schedule; both the patient and his wife were interested in continuing quetiapine at home. He also requested to remain on the split dose of keppra (500 mg in the morning and 500 mg at night) as he notes the single dose gave him urinary symptoms. At this time, Mr. Solis is agreeable to discharge to an acute rehabilitation facility.    Nursing staff reported no agitation or acute events overnight.        Objective     Last Recorded Vitals  Blood pressure 145/89, pulse 103, temperature 37.5 °C (99.5 °F), temperature source Temporal, resp. rate 20, height 1.778 m (5' 10\"), weight 64.1 kg (141 lb 5 oz), SpO2 99%.    Physical Exam  Constitutional:       Appearance: Normal appearance. He is normal weight.   HENT:      Head: Normocephalic and atraumatic.   Eyes:      Extraocular Movements: Extraocular movements intact.      Conjunctiva/sclera: Conjunctivae normal.      Pupils: Pupils are equal, round, and reactive to light.   Cardiovascular:      Rate and Rhythm: Normal rate.   Pulmonary:      Effort: Pulmonary effort is normal.   Musculoskeletal:         General: Normal range of motion.      Cervical back: Normal range of motion and neck supple.   Skin:     General: Skin is warm and dry.   Neurological:      Motor: Motor strength is normal.  Psychiatric:         Mood and Affect: Mood normal.         Speech: Speech normal.         Behavior: Behavior normal. Behavior is cooperative.         Thought Content: Thought content normal.         Judgment: Judgment normal.       Neurological Exam  Mental Status   Oriented to person, place, time and situation. Speech is normal. Language is " fluent with no aphasia. Attention and concentration are normal. Fund of knowledge is appropriate for level of education.    Cranial Nerves  CN II: Right normal visual field. Left normal visual field.  CN III, IV, VI: Extraocular movements intact bilaterally. Pupils equal round and reactive to light bilaterally.  CN V: Facial sensation is normal.  CN VII: Full and symmetric facial movement.    Motor  Normal muscle bulk throughout. Normal muscle tone. Strength is 5/5 throughout all four extremities.    Sensory  Light touch is normal in upper and lower extremities.     Relevant Results  Scheduled medications  apixaban, 5 mg, oral, BID  [Held by provider] carvedilol, 25 mg, nasogastric tube, BID  cloNIDine, 0.1 mg, oral, q8h PIPO  folic acid, 1 mg, oral, Daily  insulin lispro, 0-5 Units, subcutaneous, TID  levETIRAcetam, 500 mg, oral, BID  melatonin, 6 mg, oral, Daily  multivitamin with minerals, 1 tablet, oral, Daily  polyethylene glycol, 17 g, oral, Daily  pyridoxine, 50 mg, oral, Daily  QUEtiapine, 25 mg, oral, Nightly  thiamine, 100 mg, oral, Daily  zonisamide, 400 mg, oral, Nightly      Continuous medications     PRN medications  PRN medications: acetaminophen, dextrose, dextrose, glucagon, glucagon      Results for orders placed or performed during the hospital encounter of 09/21/24 (from the past 24 hour(s))   POCT GLUCOSE   Result Value Ref Range    POCT Glucose 90 74 - 99 mg/dL   POCT GLUCOSE   Result Value Ref Range    POCT Glucose 108 (H) 74 - 99 mg/dL                 [place] Coma Scale  Best Eye Response: Spontaneous  Best Verbal Response: Oriented  Best Motor Response: Follows commands  [place] Coma Scale Score: 15                         Assessment/Plan      Assessment & Plan  Status epilepticus (Multi)    Paddy Solis is a 73 y.o. 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 His family reported recent binge drinking and he was placed on CIWA protocol and admitted to [facility] on 9/21/2024 for close monitoring with EEG and optimization of AED regimen.    On admission, ZNS dosage was increased from 300 mg at bedtime (home regimen) to 400 mg at bedtime along with keppra 500 mg BID. A sitter and restraints were briefly required due agitation from presumed alcohol withdrawal. Since admission, he has had no seizure activity (clinical or vEEG). He was switched from keppra 500mg BID to briviact 50-50 on 9/24/2024 due to concern for increased agitation.    As of this morning (9/25/2024) he was no longer agitated. Most recent seizure most likely a complication of recent binge drinking and altered sleep schedule.    Epileptic Paroxysmal Episodes - Semiology:   1. Automotor > R Version > GTC w/ pos ictal psychosis** (left temporal onset)  2. Automotor seizure (D) (right temporal onset)  - Onset: April of 2021, possibly 1 year earlier (episodes of confusion)  - Frequency: **None since 05/23 prior to recent episode on 09/21  EZ: Bitemporal  Etiology: unknown   Comorbidities: HFrEF, NICM, afib on Eliquis   Prior AEDs: OXC (hyponatremia), Depakote (cognitive side effects)  Current AEDs:  mg at bedtime, keppra XR 1000mg at bedtime --> now ZNS 400mg and keppra instant release (crushed) 500 mg BID.    Assessment and Plan:    #Breakthrough Seizures  #CF status epilepticus  #Bitemporal epilepsy   Assessment:  - CTH: no acute intracranial abnormality  - S/p 2g Keppra load (~0100, 9/21/24)  - EEG read 09/22: bitemporal epileptogenicity (frequent spikes) but no seizures.  - EEG read 09/23: bitemporal epileptogenicity - no seizures or events seen.  - EEG read 09/24: bitemporal epileptogenicity - no seizures or events seen.  - No reported clinical seizures per nursing staff 9/26/2024     Plan:  - Continue Zonisamide 400mg at bedtime  - Continue Keppra 500 mg PO instant  release BID (crushed)  - Decrease seroquel dose to 25 mg PO qHS  - Discharge to acute rehabilitation facility pending insurance approval      #Afib  #HFrEF  Assessment:  - Repeat TTE 9/24 with improved EF to 50-55% from 35-40%, severe LA dilatation.      Plan:  - Continue tele while inpatient  - Continue home eliquis 5 mg BID  - Resume coreg (25 mg tablet) given recent high BPs        RIK BRAUNIDER - MS3        Patient seen and examined under my supervision. Documentation reviewed and edited were necessary.      Jose Mancini, DO PGY-1

## 2024-09-26 NOTE — PROGRESS NOTES
Speech-Language Pathology  Adult Inpatient Swallow Treatment    Patient Name: Paddy Solis  MRN: [id number]  Today's Date: 9/26/2024   Start Time: 1332  Stop Time: 1358  Time Calculation (min): 26    Impression:   Dysphagia therapy completed. Pt endorsed tolerating present diet without difficulty. Pt verbally recalled safe swallow strategies without further verbal cueing. Pt demonstrated use of these strategies with trials of solids/thin liquids. No further SLP services needed at this time. Pt d/c'd from SLP, however if there is a change in medical status or increase difficulty swallowing please make NPO and re-consult SLP.      Recommendations:  Regular/Thin  Upright for all PO intake  Remain upright for 60 min after eating  Small bites/sips  Straws ok  Slow rate of consumption  Medication crushed in purees  Chin tuck while swallowing     Goal:   Pt will tolerate least restrictive diet without s/s aspiration, respiratory compromise, or overt difficulty throughout admission.               Start: 09/24/24, End: 10/5/24               Status: Goal met  Patient/Family will verbalize/demonstrate comprehension of dysphagia education, strategies, recommendations and POC with 80% accuracy independently.                Start: 09/25/24, End: 10/5/24               Status: Goal met       Plan:  SLP Services Indicated: No  Frequency: N/A  Discussed POC with patient  SLP - OK to Discharge    Pain:   0-10  0 = No pain.     Inpatient Education:  Extensive education provided to patient regarding current swallow function, recommendations/results, and POC.      Consultations/Referrals/Coordination of Services:   N/A

## 2024-09-26 NOTE — ASSESSMENT & PLAN NOTE
Paddy Solis is a 73 y.o. 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 as a transfer from [place] with c/f status epilepticus. His family reported recent binge drinking and he was placed on CIWA protocol and admitted to [facility] on 9/21/2024 for close monitoring with EEG and optimization of AED regimen.    On admission, ZNS dosage was increased from 300 mg at bedtime (home regimen) to 400 mg at bedtime along with keppra 500 mg BID. A sitter and restraints were briefly required due agitation from presumed alcohol withdrawal. Since admission, he has had no seizure activity (clinical or vEEG). He was switched from keppra 500mg BID to briviact 50-50 on 9/24/2024 due to concern for increased agitation.    As of this morning (9/25/2024) he was no longer agitated. Most recent seizure most likely a complication of recent binge drinking and altered sleep schedule.    Epileptic Paroxysmal Episodes - Semiology:   1. Automotor > R Version > GTC w/ pos ictal psychosis** (left temporal onset)  2. Automotor seizure (D) (right temporal onset)  - Onset: April of 2021, possibly 1 year earlier (episodes of confusion)  - Frequency: **None since 05/23 prior to recent episode on 09/21  EZ: Bitemporal  Etiology: unknown   Comorbidities: HFrEF, NICM, afib on Eliquis   Prior AEDs: OXC (hyponatremia), Depakote (cognitive side effects)  Current AEDs:  mg at bedtime, keppra XR 1000mg at bedtime --> now ZNS 400mg and keppra instant release (crushed) 500 mg BID.

## 2024-09-27 LAB
GLUCOSE BLD MANUAL STRIP-MCNC: 114 MG/DL (ref 74–99)
GLUCOSE BLD MANUAL STRIP-MCNC: 98 MG/DL (ref 74–99)
GLUCOSE BLD MANUAL STRIP-MCNC: 99 MG/DL (ref 74–99)

## 2024-09-27 PROCEDURE — 1100000001 HC PRIVATE ROOM DAILY

## 2024-09-27 PROCEDURE — 99231 SBSQ HOSP IP/OBS SF/LOW 25: CPT

## 2024-09-27 PROCEDURE — 2500000001 HC RX 250 WO HCPCS SELF ADMINISTERED DRUGS (ALT 637 FOR MEDICARE OP)

## 2024-09-27 PROCEDURE — 2500000002 HC RX 250 W HCPCS SELF ADMINISTERED DRUGS (ALT 637 FOR MEDICARE OP, ALT 636 FOR OP/ED)

## 2024-09-27 PROCEDURE — 97112 NEUROMUSCULAR REEDUCATION: CPT | Mod: GP | Performed by: PHYSICAL THERAPIST

## 2024-09-27 PROCEDURE — 97116 GAIT TRAINING THERAPY: CPT | Mod: GP | Performed by: PHYSICAL THERAPIST

## 2024-09-27 PROCEDURE — 97530 THERAPEUTIC ACTIVITIES: CPT | Mod: GO | Performed by: OCCUPATIONAL THERAPIST

## 2024-09-27 PROCEDURE — 97535 SELF CARE MNGMENT TRAINING: CPT | Mod: GO,59 | Performed by: OCCUPATIONAL THERAPIST

## 2024-09-27 PROCEDURE — 82947 ASSAY GLUCOSE BLOOD QUANT: CPT

## 2024-09-27 RX ORDER — QUETIAPINE FUMARATE 25 MG/1
25 TABLET, FILM COATED ORAL NIGHTLY
Status: DISCONTINUED | OUTPATIENT
Start: 2024-09-27 | End: 2024-09-30 | Stop reason: HOSPADM

## 2024-09-27 RX ADMIN — QUETIAPINE FUMARATE 25 MG: 25 TABLET ORAL at 20:49

## 2024-09-27 RX ADMIN — FOLIC ACID 1 MG: 1 TABLET ORAL at 08:08

## 2024-09-27 RX ADMIN — APIXABAN 5 MG: 5 TABLET, FILM COATED ORAL at 20:49

## 2024-09-27 RX ADMIN — LEVETIRACETAM 500 MG: 500 TABLET, FILM COATED ORAL at 22:51

## 2024-09-27 RX ADMIN — Medication 6 MG: at 20:48

## 2024-09-27 RX ADMIN — CARVEDILOL 25 MG: 25 TABLET, FILM COATED ORAL at 08:08

## 2024-09-27 RX ADMIN — LEVETIRACETAM 500 MG: 500 TABLET, FILM COATED ORAL at 00:11

## 2024-09-27 RX ADMIN — LEVETIRACETAM 500 MG: 500 TABLET, FILM COATED ORAL at 11:36

## 2024-09-27 RX ADMIN — THIAMINE HCL TAB 100 MG 100 MG: 100 TAB at 08:08

## 2024-09-27 RX ADMIN — PYRIDOXINE HCL TAB 50 MG 50 MG: 50 TAB at 08:08

## 2024-09-27 RX ADMIN — ZONISAMIDE 400 MG: 100 CAPSULE ORAL at 20:48

## 2024-09-27 RX ADMIN — CARVEDILOL 25 MG: 25 TABLET, FILM COATED ORAL at 20:49

## 2024-09-27 RX ADMIN — APIXABAN 5 MG: 5 TABLET, FILM COATED ORAL at 08:08

## 2024-09-27 RX ADMIN — Medication 1 TABLET: at 08:08

## 2024-09-27 ASSESSMENT — PAIN - FUNCTIONAL ASSESSMENT
PAIN_FUNCTIONAL_ASSESSMENT: 0-10

## 2024-09-27 ASSESSMENT — BALANCE ASSESSMENTS
STANDING TO SITTING: ABLE TO STAND WITHOUT USING HANDS AND STABILIZE INDEPENDENTLY
TRANSFERS: ABLE TO TRANSFER SAFELY WITH MINOR USE OF HANDS
STANDING UNSUPPORTED: ABLE TO STAND 2 MINUTES WITH SUPERVISION
TURN 360 DEGREES: ABLE TO TURN 360 DEGREES SAFELY IN 4 SECONDS OR LESS
STANDING ON ONE LEG: UNABLE TO TRY NEEDS ASSIST TO PREVENT FALL
LONG VERSION TOTAL SCORE (MAX 56): 45
PLACE ALTERNATE FOOT ON STEP OR STOOL WHILE STANDING UNSUPPORTED: LOOKS BEHIND FROM BOTH SIDES AND WEIGHT SHIFTS WELL
STANDING UNSUPPORTED WITH EYES CLOSED: ABLE TO STAND 10 SECONDS SAFELY
STANDING UNSUPPORTED WITH FEET TOGETHER: ABLE TO PLACE FEET TOGETHER INDEPENDENTLY AND STAND 1 MINUTE SAFELY
PLACE ALTERNATE FOOT ON STEP OR STOOL WHILE STANDING UNSUPPORTED: ABLE TO COMPLETE 4 STEPS WITHOUT AID WITH SUPERVISION
REACHING FORWARD WITH OUTSTRETCHED ARM WHILE STANDING: CAN REACH FORWARD CONFIDENTLY 25 CM (10 INCHES)
STANDING UNSUPPORTED ONE FOOT IN FRONT: LOSES BALANCE WHILE STEPPING OR STANDING
PICK UP OBJECT FROM THE FLOOR FROM A STANDING POSITION: ABLE TO PICK UP SLIPPER SAFELY AND EASILY
SITTING WITH BACK UNSUPPORTED BUT FEET SUPPORTED ON FLOOR OR ON A STOOL: ABLE TO SIT SAFELY AND SECURELY FOR 2 MINUTES
STANDING TO SITTING: SITS SAFELY WITH MINIMAL USE OF HANDS

## 2024-09-27 ASSESSMENT — PAIN SCALES - GENERAL
PAINLEVEL_OUTOF10: 0 - NO PAIN

## 2024-09-27 ASSESSMENT — COGNITIVE AND FUNCTIONAL STATUS - GENERAL
WALKING IN HOSPITAL ROOM: A LITTLE
CLIMB 3 TO 5 STEPS WITH RAILING: A LITTLE
MOVING TO AND FROM BED TO CHAIR: A LITTLE
MOBILITY SCORE: 24
DAILY ACTIVITIY SCORE: 24
MOBILITY SCORE: 19
STANDING UP FROM CHAIR USING ARMS: A LITTLE
TURNING FROM BACK TO SIDE WHILE IN FLAT BAD: A LITTLE

## 2024-09-27 ASSESSMENT — ACTIVITIES OF DAILY LIVING (ADL): HOME_MANAGEMENT_TIME_ENTRY: 32

## 2024-09-27 NOTE — PROGRESS NOTES
9/27/24 @1039 Transitional Care Coordinator Note  [facility] requesting PT/OT notes for precert, PT/OT requested via EPIC. Will continue to follow.     9/27/24 @9545 addendum   Per team pt is medically ready. Per Tiffanie Quintero she is escalating precert, due to they have not assigned the case yet to a review RN. Team aware. Will continue to follow.     9/27/24 @2598 addedum  UPDATE received from Tiffanie Quintero from direct precert team stating that she called and was told they would prioritize as no one has been assigned to this case yet and the pt is ready for DC. Team aware. Will continue to follow.

## 2024-09-27 NOTE — PROGRESS NOTES
Occupational Therapy    Occupational Therapy    Occupational Therapy Treatment    Name: Paddy Solis  MRN: [id number]  : 1950  Date: 24  Room: [address] [place] [address]      Time Calculation  Start Time: 1724  Stop Time: 1809  Time Calculation (min): 45 min    Assessment:  OT Assessment: Patient demonstrating improvement in cognition, functional transfer, functional bed mobiltiy, balance, ADL, and functional mobility this date. Continued OT indicated. Patient is highly motivated to return to prior level.  End of Session Communication: Bedside nurse  End of Session Patient Position: Bed, 3 rail up, Alarm off, not on at start of session  Plan:  Treatment Interventions: ADL retraining, Functional transfer training, Endurance training, Cognitive reorientation, Patient/family training, Neuromuscular reeducation, Fine motor coordination activities, Compensatory technique education, UE strengthening/ROM  OT Frequency: 4 times per week  OT Discharge Recommendations: High intensity level of continued care  Equipment Recommended upon Discharge: Other (comment) (possibly shower chair; TBD at AR)  OT Recommended Transfer Status: Moderate assist (x2 person)  OT - OK to Discharge: Yes    Subjective   General:     Prior to Session Communication: Bedside nurse  Patient Position Received: Bed, 3 rail up, Alarm off, not on at start of session   Precautions:  Hearing/Visual Limitations:  ([place])  Medical Precautions: Fall precautions, Seizure precautions (previous agitation (not today) and CIWA)  Precautions Comment: SBP <180, CIWA protocol  Vitals:  Vital Signs (Past 2hrs)        Date/Time Vitals Session Patient Position Pulse Resp SpO2 BP MAP (mmHg)    24 1725 --  --  97  18  96 %  148/95  113                          Cognition:  Orientation Level: Oriented X4  Attention: Exceptions to WFL  Sustained Attention: Impaired  Memory: Exceptions to WFL  Working Memory: Impaired  4AT  Alertness    xNormal (fully alert, but not  agitated, throughout assessment) 0          Mild sleepiness for <10 seconds after waking, then normal 0  Clearly abnormal 4  Score: 0    AMT4  Age, date of birth, place (name of the hospital or building), current year  xNo mistakes 0  1 mistake 1  2 or more mistakes/untestable 2  Score: 1    Attention  Months of the year backwards Achieves   7 months or more correctly 0  xStarts but scores <7 months / refuses to start 1  Untestable (cannot start because unwell, drowsy, inattentive) 2  Score: 0    Acute change or fluctuating course  Evidence of significant change or fluctuation in: alertness, cognition, other mental function  (eg. paranoia, hallucinations) arising over the last 2 weeks and still evident in last 24hrs  xNo 0  Yes 4  Score: 0- chart does not indicate continued change over last 24 hours    Total score: 1  4 or above: possible delirium +/- cognitive impairment  1-3: possible cognitive impairment  0: delirium or severe cognitive impairment unlikely (but  delirium still possible if [4] information incomplete)   Pain Assessment:  Pain Assessment  Pain Assessment: 0-10  0-10 (Numeric) Pain Score: 0 - No pain     Objective   Activities of Daily Living:        Grooming  Grooming Level of Assistance: Setup, Distant supervision (to comb hair)  Grooming Where Assessed: Bed level    UE Bathing  UE Bathing Level of Assistance: Setup, Minimal verbal cues (to wash hair using shampoo cap)         LE Dressing  Pants Level of Assistance: Close supervision (to don scrub pants)  LE Dressing Where Assessed: Edge of bed    Toileting  Toileting Level of Assistance: Close supervision  Where Assessed: Toilet    Functional Standing Tolerance:  Functional Mobility 1  Surface 1: Level tile  Device 1: No device  Assistance 1: Close supervision  Comments 1: to and from bathroom x2  Bed Mobility/Transfers:   Bed Mobility  Bed Mobility: Yes  Bed Mobility 1  Bed Mobility 1: Supine to sitting, Sitting to supine  Level of Assistance 1:  Distant supervision  Bed Mobility Comments 1: 3 trials    Transfer 1  Transfer From 1: Sit to, Stand to  Transfer to 1: Stand, Sit  Transfer Level of Assistance 1: Close supervision  Transfers 2  Transfer From 2: Bed to, Toilet to  Transfer to 2: Toilet, Bed  Transfer Level of Assistance 2: Close supervision  Trials/Comments 2: 2 trials     Balance:  Dynamic Sitting Balance  Dynamic Sitting-Level of Assistance: Close supervision  Dynamic Standing Balance  Dynamic Standing-Level of Assistance: Close supervision  Static Sitting Balance  Static Sitting-Level of Assistance: Distant supervision  Static Standing Balance  Static Standing-Level of Assistance: Close supervision    Cognitive Skill Development:  Cognitive Skill Development  Cognitive Skill Development Activity 1: Patient instructed in delirium prevention techinques. Encouraged patient to open shades, watch tv, listen to radio, turn on lights, talk with family/friends, keep up on normal routines like bathin/dressing/grooming.  Patient receptive to instruction.  Cognitive Skill Development Activity 1: Patient instructed in delirium prevention techinques. Encouraged patient to open shades, watch tv, listen to radio, turn on lights, talk with family/friends, keep up on normal routines like bathin/dressing/grooming.  Patient receptive to instruction.        Outcome Measures:  OT Adult Other Outcome Measures  4AT: 1- possible cognitive impairment or delirium     Education Documentation  Body Mechanics, taught by Nolvia Garcia OT at 9/27/2024  6:11 PM.  Learner: Patient  Readiness: Acceptance  Method: Explanation  Response: Verbalizes Understanding    Precautions, taught by Nolvia Garcia OT at 9/27/2024  6:11 PM.  Learner: Patient  Readiness: Acceptance  Method: Explanation  Response: Verbalizes Understanding    ADL Training, taught by Nolvia Garcia OT at 9/27/2024  6:11 PM.  Learner: Patient  Readiness: Acceptance  Method: Explanation  Response: Verbalizes  Understanding    Education Comments  No comments found.        Goals:  Encounter Problems       Encounter Problems (Active)       ADLs       Patient will independently perform UB and LB bathing.   (Progressing)       Start:  09/24/24    Expected End:  10/08/24            Patient will independently complete upper body dressing donning and doffing all UE clothes  (Progressing)       Start:  09/24/24    Expected End:  10/08/24            Patient will independently complete lower body dressing donning and doffing all LE clothes.  (Progressing)       Start:  09/24/24    Expected End:  10/08/24            Patient will independently complete daily grooming tasks.  (Progressing)       Start:  09/24/24    Expected End:  10/08/24            Patient will independently complete toileting including hygiene and clothing management.  (Progressing)       Start:  09/24/24    Expected End:  10/08/24               BALANCE       Pt will tolerate static/dynamic sitting tasks >15 min independently without LOB during ADL and IADL tasks in order to return to PLOF.  (Progressing)       Start:  09/24/24    Expected End:  10/08/24            Pt will tolerate static/dynamic standing tasks >10 min with modified independence and LRAD without LOB during ADL and IADL tasks in order to return to PLOF.  (Progressing)       Start:  09/24/24    Expected End:  10/08/24               COGNITION/SAFETY       Patient will follow 100% one-step commands with no repetition to allow improved ADL performance.  (Progressing)       Start:  09/24/24    Expected End:  10/08/24            Patient will demonstrate orientation x4 with no verbal cues (Progressing)       Start:  09/24/24    Expected End:  10/08/24       ORIENTATION            MOBILITY       Patient will perform functional mobility household distances with modified independence and LRAD in order to improve safety and functional mobility.  (Progressing)       Start:  09/24/24    Expected End:  10/08/24                TRANSFERS       Patient will independently perform bed mobility in order to improve safety and independence with mobility.  (Progressing)       Start:  09/24/24    Expected End:  10/08/24               TRANSFERS       Patient will independently complete functional transfers.  (Progressing)       Start:  09/24/24    Expected End:  10/08/24                     09/27/24 at 6:15 PM   Nolvia Garcia, OT   [phone]

## 2024-09-27 NOTE — PROGRESS NOTES
"Paddy Solis is a 74 y.o. male on day 6 of admission presenting with Status epilepticus (Multi).      Subjective   NAONE, awaiting precert for acute rehab.       Objective     Last Recorded Vitals  Blood pressure (!) 152/92, pulse 73, temperature 35.8 °C (96.4 °F), resp. rate 17, height 1.778 m (5' 10\"), weight 64.1 kg (141 lb 5 oz), SpO2 98%.    Physical Exam  Constitutional:       Appearance: Normal appearance. He is normal weight.   HENT:      Head: Normocephalic and atraumatic.   Eyes:      Extraocular Movements: Extraocular movements intact.      Conjunctiva/sclera: Conjunctivae normal.      Pupils: Pupils are equal, round, and reactive to light.   Cardiovascular:      Rate and Rhythm: Normal rate.   Pulmonary:      Effort: Pulmonary effort is normal.   Musculoskeletal:         General: Normal range of motion.      Cervical back: Normal range of motion and neck supple.   Skin:     General: Skin is warm and dry.   Neurological:      Motor: Motor strength is normal.  Psychiatric:         Mood and Affect: Mood normal.         Speech: Speech normal.         Behavior: Behavior normal. Behavior is cooperative.         Thought Content: Thought content normal.         Judgment: Judgment normal.       Neurological Exam  Mental Status   Oriented to person, place, time and situation. Speech is normal. Language is fluent with no aphasia. Attention and concentration are normal. Fund of knowledge is appropriate for level of education.    Cranial Nerves  CN II: Right normal visual field. Left normal visual field.  CN III, IV, VI: Extraocular movements intact bilaterally. Pupils equal round and reactive to light bilaterally.  CN V: Facial sensation is normal.  CN VII: Full and symmetric facial movement.    Motor  Normal muscle bulk throughout. Normal muscle tone. Strength is 5/5 throughout all four extremities.    Sensory  Light touch is normal in upper and lower extremities.     Relevant Results  Scheduled " medications  apixaban, 5 mg, oral, BID  carvedilol, 25 mg, nasogastric tube, BID  folic acid, 1 mg, oral, Daily  insulin lispro, 0-5 Units, subcutaneous, TID  levETIRAcetam, 500 mg, oral, BID  melatonin, 6 mg, oral, Daily  multivitamin with minerals, 1 tablet, oral, Daily  polyethylene glycol, 17 g, oral, Daily  pyridoxine, 50 mg, oral, Daily  thiamine, 100 mg, oral, Daily  zonisamide, 400 mg, oral, Nightly      Continuous medications     PRN medications  PRN medications: acetaminophen, dextrose, dextrose, glucagon, glucagon      Results for orders placed or performed during the hospital encounter of 09/21/24 (from the past 24 hour(s))   POCT GLUCOSE   Result Value Ref Range    POCT Glucose 110 (H) 74 - 99 mg/dL   POCT GLUCOSE   Result Value Ref Range    POCT Glucose 117 (H) 74 - 99 mg/dL   POCT GLUCOSE   Result Value Ref Range    POCT Glucose 98 74 - 99 mg/dL                 Alexander Coma Scale  Best Eye Response: Spontaneous  Best Verbal Response: Oriented  Best Motor Response: Follows commands  Devon Coma Scale Score: 15                         Assessment/Plan      Assessment & Plan  Status epilepticus (Multi)    Paddy Solis is a 73 y.o. 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 His family reported recent binge drinking and he was placed on CIWA protocol and admitted to [facility] on 9/21/2024 for close monitoring with EEG and optimization of AED regimen.    On admission, ZNS dosage was increased from 300 mg at bedtime (home regimen) to 400 mg at bedtime along with keppra 500 mg BID. A sitter and restraints were briefly required due agitation from presumed alcohol withdrawal. Since admission, he has had no seizure activity (clinical or vEEG). He was switched from keppra 500mg BID to briviact 50-50 on 9/24/2024 due to concern for increased  agitation.    As of this morning (9/25/2024) he was no longer agitated. Most recent seizure most likely a complication of recent binge drinking and altered sleep schedule.    Epileptic Paroxysmal Episodes - Semiology:   1. Automotor > R Version > GTC w/ pos ictal psychosis** (left temporal onset)  2. Automotor seizure (D) (right temporal onset)  - Onset: April of 2021, possibly 1 year earlier (episodes of confusion)  - Frequency: **None since 05/23 prior to recent episode on 09/21  EZ: Bitemporal  Etiology: unknown   Comorbidities: HFrEF, NICM, afib on Eliquis   Prior AEDs: OXC (hyponatremia), Depakote (cognitive side effects)  Current AEDs:  mg at bedtime, keppra XR 1000mg at bedtime --> now ZNS 400mg and keppra instant release (crushed) 500 mg BID.    Assessment and Plan:    #Breakthrough Seizures  #CF status epilepticus  #Bitemporal epilepsy   Assessment:  - CTH: no acute intracranial abnormality  - S/p 2g Keppra load (~0100, 9/21/24)  - EEG read 09/22: bitemporal epileptogenicity (frequent spikes) but no seizures.  - EEG read 09/23: bitemporal epileptogenicity - no seizures or events seen.  - EEG read 09/24: bitemporal epileptogenicity - no seizures or events seen.  - No reported clinical seizures per nursing staff 9/26/2024     Plan:  - Continue Zonisamide 400mg at bedtime  - Continue Keppra 500 mg PO instant release BID (crushed)  - Decrease seroquel dose to 25 mg PO qHS  - Discharge to acute rehabilitation facility pending insurance approval      #Afib  #HFrEF  Assessment:  - Repeat TTE 9/24 with improved EF to 50-55% from 35-40%, severe LA dilatation.      Plan:  - Continue tele while inpatient  - Continue home eliquis 5 mg BID  - Resume coreg (25 mg tablet) given recent high BPs        Jose Mancini DO   PGY1, Neurology

## 2024-09-27 NOTE — PROGRESS NOTES
Physical Therapy    Physical Therapy Treatment    Patient Name: Paddy Solis  MRN: [id number]  Department: [place] [address]  Room: [address] [place] [address]  Today's Date: 9/27/2024  Time Calculation  Start Time: 1102  Stop Time: 1135  Time Calculation (min): 33 min         Assessment/Plan   PT Assessment  PT Assessment Results: Decreased endurance, Impaired balance, Decreased mobility, Decreased cognition, Decreased coordination, Decreased safety awareness, Decreased strength  Rehab Prognosis: Excellent  Barriers to Discharge: none noted  Evaluation/Treatment Tolerance: Patient tolerated treatment well  Medical Staff Made Aware: Yes  Strengths: Ability to acquire knowledge, Attitude of self, Coping skills, Premorbid level of function, Support of Caregivers  Barriers to Participation: Other (Comment) (none)  End of Session Communication: Bedside nurse  Assessment Comment: 73 yo male s/p staus epilepticus with hypoxic respiratory failure and prolonged hospitalization presents with balance deficits/fall risk (see Veloz Balance test score), possible decreased safety awareness and mild decondiitoning. Recommend short high intensity therapy as pt needs all 3 therapies, is not at baseline and is high fall risk.  End of Session Patient Position: Up in chair, Alarm on  PT Plan  Inpatient/Swing Bed or Outpatient: Inpatient  PT Plan  Treatment/Interventions: Bed mobility, Transfer training, Gait training, Stair training, Balance training, Neuromuscular re-education, Strengthening, Endurance training, Therapeutic exercise, Home exercise program, Therapeutic activity, Postural re-education  PT Plan: Ongoing PT  PT Frequency: 3 times per week  PT Discharge Recommendations: High intensity level of continued care  Equipment Recommended upon Discharge: Other (comment) (possibly shower chair; TBD at [facility])  PT Recommended Transfer Status: Assist x1 (SBA no device)  PT - OK to Discharge: Yes (PT eval completed & DC recs made)      General Visit Information:    PT  Visit  PT Received On: 09/27/24  Response to Previous Treatment: Patient with no complaints from previous session.  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 Pt reports no pain nor headached.    Subjective   Precautions:  Precautions  Hearing/Visual Limitations:  (Nikolai)  Medical Precautions: Fall precautions, Seizure precautions (previous agitation (not today) and CIWA)    Vital Signs (Past 2hrs)        Date/Time Vitals Session Patient Position Pulse Resp SpO2 BP MAP (mmHg)    09/27/24 1102 During PT  Standing  105  --  94 %  --  --                   Vital Signs Comment: post gait/stairclimbing     Objective   Pain:  Pain Assessment  Pain Assessment: 0-10  0-10 (Numeric) Pain Score: 0 - No pain  Cognition:  Cognition  Arousal/Alertness: Appropriate responses to stimuli (appears Nikolai)  Orientation Level: Oriented X4  Following Commands: Follows all commands and directions without difficulty  Cognition Comments: per chart has had agitation/emotional regulation issues (not with PT today)  Safety/Judgement: Exceptions to WFL (mild)  Insight: Mild  Impulsive: Mildly  Processing Speed: Delayed (likely due to hearing deficits)  Coordination:     Postural Control:  Postural Control  Postural Control: Impaired  Posture Comment: standing: mild  upper trunk flexion/rounded shoulders, wide DEBBIE/bilateral hip ext rotation, mild hip/knee flexion bilaterally  Static Sitting Balance  Static Sitting-Balance Support: No upper extremity supported, Feet supported  Static Sitting-Level of Assistance: Close supervision  Dynamic Sitting Balance  Dynamic Sitting-Balance Support: Feet supported, No upper extremity supported  Dynamic Sitting-Level of Assistance: Close supervision  Static Standing Balance  Static Standing-Balance Support: No upper extremity supported (no device)  Static Standing-Level of Assistance: Close supervision  Dynamic Standing Balance  Dynamic Standing-Balance Support: No upper extremity supported (no device)  Dynamic Standing-Level of Assistance:  (SBA/CGA, cues)  Dynamic Standing-Balance:  (gait including turns/starts & stops and head turning)    Activity Tolerance:  Activity Tolerance  Endurance: Endurance does not limit participation in activity  Treatments:  Balance/Neuromuscular Re-Education  Balance/Neuromuscular Re-Education Activity Performed: Yes  Balance/Neuromuscular Re-Education Activity 1: standing balance activities and testing as noted    Bed Mobility  Bed Mobility: Yes  Bed Mobility 1  Bed Mobility 1: Supine to sitting  Level of Assistance 1: Modified independent  Bed Mobility Comments 1: HOB elevated, out on Right side    Ambulation/Gait Training  Ambulation/Gait Training Performed: Yes  Ambulation/Gait Training 1  Surface 1: Level tile  Device 1: No device  Gait Support Devices: Gait belt  Assistance 1: Close supervision (SBA)  Quality of Gait 1:  (wide DEBBIE, ext rotated hips, slightly flexed hips/knees, increased lateral sway, cues to stay on task intermittently)  Comments/Distance (ft) 1: 175  Ambulation/Gait Training 2  Surface 2: Level tile  Device 2: No device  Gait Support Devices: Gait belt  Assistance 2: Contact guard (SBA/CGA)  Quality of Gait 2:  (as noted)  Comments/Distance (ft) 2: 150' (starts/stops, head turns,  "increased marce activities)  Transfers  Transfer: Yes  Transfer 1  Transfer From 1: Sit to, Stand to  Transfer to 1: Sit, Stand  Technique 1: Sit to stand, Stand to sit  Transfer Device 1: Gait belt  Transfer Level of Assistance 1: Close supervision  Transfers 2  Transfer From 2: Stand to  Transfer to 2: Chair with arms  Technique 2: Stand pivot, Stand to sit  Transfer Device 2: Gait belt  Transfer Level of Assistance 2: Close supervision    Stairs  Stairs: Yes (up/down 4 x 6\" steps x 3 with 1 rail with CG/cues for safety, reciprocally)         Outcome Measures:  [facility] Basic Mobility  Turning from your back to your side while in a flat bed without using bedrails: None  Moving from lying on your back to sitting on the side of a flat bed without using bedrails: A little  Moving to and from bed to chair (including a wheelchair): A little  Standing up from a chair using your arms (e.g. wheelchair or bedside chair): A little  To walk in hospital room: A little  Climbing 3-5 steps with railing: A little  Basic Mobility - Total Score: 19    Veloz Balance Scale  1. Sitting to Standing: Able to stand without using hands and stabilize independently  2. Standing Unsupported: Able to stand 2 minutes with supervision  3. Sitting with Back Unsupported but Feet Supported on Floor or on a Stool: Able to sit safely and securely for 2 minutes  4. Standing to Sitting: Sits safely with minimal use of hands  5.  Transfers: Able to transfer safely with minor use of hands  6. Standing Unsupported with Eyes Closed: Able to stand 10 seconds safely  7. Standing Unsupported with Feet Together: Able to place feet together independently and stand 1 minute safely  8. Reach Forward with Outstretched Arm While Standing: Can reach forward confidently 25 cm (10 inches)  9.  Object from Floor from a Standing Position: Able to  slipper safely and easily  10. Turning to Look Behind Over Left and Right Shoulders While Standing: Looks " behind from both sides and weight shifts well  11. Turn 360 Degrees: Able to turn 360 degrees safely in 4 seconds or less  12. Place Alternate Foot on Step or Stool While Standing Unsupported: Able to complete 4 steps without aid with supervision  13. Standing Unsupported One Foot in Front: Loses balance while stepping or standing  14. Standing on One Leg: Unable to try needs assist to prevent fall  Veloz Balance Score: 45    Other Measures  5x Sit to Stand: 12.98 seconds with SBA    Education Documentation  Precautions, taught by Isabela Blancas PT at 9/27/2024 11:41 AM.  Learner: Significant Other, Patient  Readiness: Eager  Method: Explanation, Demonstration, Teach-back  Response: Verbalizes Understanding, Demonstrated Understanding  Comment: PT purpose/POC, safe gait/stairclimbing, balance deficits/fall risk, vitals    Body Mechanics, taught by Isabela Blancas PT at 9/27/2024 11:41 AM.  Learner: Significant Other, Patient  Readiness: Eager  Method: Explanation, Demonstration, Teach-back  Response: Verbalizes Understanding, Demonstrated Understanding  Comment: PT purpose/POC, safe gait/stairclimbing, balance deficits/fall risk, vitals    Mobility Training, taught by Isabela Blancas PT at 9/27/2024 11:41 AM.  Learner: Significant Other, Patient  Readiness: Eager  Method: Explanation, Demonstration, Teach-back  Response: Verbalizes Understanding, Demonstrated Understanding  Comment: PT purpose/POC, safe gait/stairclimbing, balance deficits/fall risk, vitals    Education Comments  No comments found.          Encounter Problems       Encounter Problems (Active)       PT Problem       Patient will perform bed mobility IND to reduce risk of developing decubitus ulcers.  (Not met)       Start:  09/24/24    Expected End:  10/08/24    Resolved:  09/27/24    Updated to: Pt will come supine to/from sit (HOB flat, no rail) independently    Update reason: update         Patient will perform sit to stand and stand to sit  transfers with </= close sup and LRD to increase functional strength.  (Not met)       Start:  09/24/24    Expected End:  10/11/24    Resolved:  09/27/24    Updated to: Patient will come sit to/from stand, bed to/from chair & toilet to/from stand independently safely    Update reason: update         Patient will ambulate at least 150  ft. with </= close sup and LRD to improve tolerance of community distances.    (Not met)       Start:  09/24/24    Expected End:  10/08/24    Resolved:  09/27/24    Updated to: Pt will ambulate at gait speed >1.0 meters/second 1000' including turns modified independent, no LOB    Update reason: update         Patient will ascend and descend >/= 2 steps with railing and </= close sup to facilitate safe navigation of stairs in the home.    (Not met)       Start:  09/24/24    Expected End:  10/08/24    Resolved:  09/27/24    Updated to: Pt will ascend/descend 12 steps with 1 rail reciprocally independently, no LOB; up/down 2 steps no rail reciprocally independently with no LOB also    Update reason: update         Patient will perform 5x Sit to Stand test in <15 sec to indicate decreased risk of falling.  (Not met)       Start:  09/24/24    Expected End:  10/08/24    Resolved:  09/27/24    Updated to: Patient will perform 5x Sit to Stand test in <11.6 sec to indicate decreased risk of falling.    Update reason: update         Pt will come supine to/from sit (HOB flat, no rail) independently (Progressing)       Start:  09/27/24    Expected End:  10/11/24                Pt will ambulate at gait speed >1.0 meters/second 1000' including turns modified independent, no LOB (Progressing)       Start:  09/27/24    Expected End:  10/11/24                Pt will ascend/descend 12 steps with 1 rail reciprocally independently, no LOB; up/down 2 steps no rail reciprocally independently with no LOB also (Progressing)       Start:  09/27/24    Expected End:  10/11/24                Patient will perform  5x Sit to Stand test in <11.6 sec to indicate decreased risk of falling. (Progressing)       Start:  09/27/24    Expected End:  10/11/24                Patient will come sit to/from stand, bed to/from chair & toilet to/from stand independently safely (Progressing)       Start:  09/27/24    Expected End:  10/11/24                   PT Problem       Pt will get score >48 on Veloz Balance scale indicating low risk for falls (Progressing)       Start:  09/27/24    Expected End:  10/11/24               Pain - Adult

## 2024-09-27 NOTE — CARE PLAN
Problem: Pain - Adult  Goal: Verbalizes/displays adequate comfort level or baseline comfort level  Outcome: Progressing     Problem: Safety - Adult  Goal: Free from fall injury  Outcome: Progressing     Problem: Fall/Injury  Goal: Be free from injury by end of the shift  Outcome: Progressing   The patient's goals for the shift include being able to rest throughout the night.     The clinical goals for the shift include Pt will remain HDS throughout the shift.    Over the shift, the patient did make progress and had no pain the entire night. The pt was free from injuries and falls throughout the shift.

## 2024-09-27 NOTE — ASSESSMENT & PLAN NOTE
Paddy Solis is a 73 y.o. 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 His family reported recent binge drinking and he was placed on CIWA protocol and admitted to [facility] on 9/21/2024 for close monitoring with EEG and optimization of AED regimen.    On admission, ZNS dosage was increased from 300 mg at bedtime (home regimen) to 400 mg at bedtime along with keppra 500 mg BID. A sitter and restraints were briefly required due agitation from presumed alcohol withdrawal. Since admission, he has had no seizure activity (clinical or vEEG). He was switched from keppra 500mg BID to briviact 50-50 on 9/24/2024 due to concern for increased agitation.    As of this morning (9/25/2024) he was no longer agitated. Most recent seizure most likely a complication of recent binge drinking and altered sleep schedule.    Epileptic Paroxysmal Episodes - Semiology:   1. Automotor > R Version > GTC w/ pos ictal psychosis** (left temporal onset)  2. Automotor seizure (D) (right temporal onset)  - Onset: April of 2021, possibly 1 year earlier (episodes of confusion)  - Frequency: **None since 05/23 prior to recent episode on 09/21  EZ: Bitemporal  Etiology: unknown   Comorbidities: HFrEF, NICM, afib on Eliquis   Prior AEDs: OXC (hyponatremia), Depakote (cognitive side effects)  Current AEDs:  mg at bedtime, keppra XR 1000mg at bedtime --> now ZNS 400mg and keppra instant release (crushed) 500 mg BID.

## 2024-09-28 LAB
ATRIAL RATE: 96 BPM
GLUCOSE BLD MANUAL STRIP-MCNC: 87 MG/DL (ref 74–99)
GLUCOSE BLD MANUAL STRIP-MCNC: 98 MG/DL (ref 74–99)
GLUCOSE BLD MANUAL STRIP-MCNC: 99 MG/DL (ref 74–99)
Q ONSET: 222 MS
QRS COUNT: 12 BEATS
QRS DURATION: 82 MS
QT INTERVAL: 428 MS
QTC CALCULATION(BAZETT): 465 MS
QTC FREDERICIA: 452 MS
R AXIS: -67 DEGREES
T AXIS: -12 DEGREES
T OFFSET: 436 MS
VENTRICULAR RATE: 71 BPM

## 2024-09-28 PROCEDURE — 2500000001 HC RX 250 WO HCPCS SELF ADMINISTERED DRUGS (ALT 637 FOR MEDICARE OP)

## 2024-09-28 PROCEDURE — 82947 ASSAY GLUCOSE BLOOD QUANT: CPT

## 2024-09-28 PROCEDURE — 1100000001 HC PRIVATE ROOM DAILY

## 2024-09-28 PROCEDURE — 99231 SBSQ HOSP IP/OBS SF/LOW 25: CPT

## 2024-09-28 PROCEDURE — 2500000002 HC RX 250 W HCPCS SELF ADMINISTERED DRUGS (ALT 637 FOR MEDICARE OP, ALT 636 FOR OP/ED)

## 2024-09-28 RX ADMIN — APIXABAN 5 MG: 5 TABLET, FILM COATED ORAL at 09:12

## 2024-09-28 RX ADMIN — QUETIAPINE FUMARATE 25 MG: 25 TABLET ORAL at 20:13

## 2024-09-28 RX ADMIN — ZONISAMIDE 400 MG: 100 CAPSULE ORAL at 20:13

## 2024-09-28 RX ADMIN — Medication 1 TABLET: at 09:12

## 2024-09-28 RX ADMIN — THIAMINE HCL TAB 100 MG 100 MG: 100 TAB at 09:12

## 2024-09-28 RX ADMIN — PYRIDOXINE HCL TAB 50 MG 50 MG: 50 TAB at 09:12

## 2024-09-28 RX ADMIN — FOLIC ACID 1 MG: 1 TABLET ORAL at 09:12

## 2024-09-28 RX ADMIN — CARVEDILOL 25 MG: 25 TABLET, FILM COATED ORAL at 20:13

## 2024-09-28 RX ADMIN — APIXABAN 5 MG: 5 TABLET, FILM COATED ORAL at 20:13

## 2024-09-28 RX ADMIN — LEVETIRACETAM 500 MG: 500 TABLET, FILM COATED ORAL at 12:31

## 2024-09-28 RX ADMIN — CARVEDILOL 25 MG: 25 TABLET, FILM COATED ORAL at 09:12

## 2024-09-28 ASSESSMENT — COGNITIVE AND FUNCTIONAL STATUS - GENERAL
PERSONAL GROOMING: A LITTLE
CLIMB 3 TO 5 STEPS WITH RAILING: A LITTLE
STANDING UP FROM CHAIR USING ARMS: A LITTLE
TURNING FROM BACK TO SIDE WHILE IN FLAT BAD: A LITTLE
WALKING IN HOSPITAL ROOM: A LITTLE
DRESSING REGULAR UPPER BODY CLOTHING: A LITTLE
HELP NEEDED FOR BATHING: A LITTLE
MOBILITY SCORE: 18
DAILY ACTIVITIY SCORE: 18
MOVING TO AND FROM BED TO CHAIR: A LITTLE
MOBILITY SCORE: 24
DRESSING REGULAR LOWER BODY CLOTHING: A LITTLE
EATING MEALS: A LITTLE
TOILETING: A LITTLE
MOVING FROM LYING ON BACK TO SITTING ON SIDE OF FLAT BED WITH BEDRAILS: A LITTLE

## 2024-09-28 ASSESSMENT — PAIN SCALES - GENERAL
PAINLEVEL_OUTOF10: 0 - NO PAIN

## 2024-09-28 ASSESSMENT — PAIN - FUNCTIONAL ASSESSMENT
PAIN_FUNCTIONAL_ASSESSMENT: 0-10

## 2024-09-28 NOTE — PROGRESS NOTES
Pt medically ready. Per direct pre-cert submit team, at MD review, and  P2P may be needed but no info on this as yet. [facility] updated medical team. Will continue to follow.

## 2024-09-28 NOTE — PROGRESS NOTES
"Paddy Solis is a 74 y.o. male on day 7 of admission presenting with Status epilepticus (Multi).      Subjective   No events overnight.    Patient states he slept well. Denies episodes of seizure-like activity. Eager to go to rehab (request escalated yesterday).       Objective     Last Recorded Vitals  Blood pressure 148/90, pulse 77, temperature 36.1 °C (97 °F), temperature source Temporal, resp. rate 18, height 1.778 m (5' 10\"), weight 64.1 kg (141 lb 5 oz), SpO2 98%.    Physical Exam  Constitutional:       Appearance: Normal appearance. He is normal weight.   HENT:      Head: Normocephalic and atraumatic.   Eyes:      Extraocular Movements: Extraocular movements intact.      Conjunctiva/sclera: Conjunctivae normal.      Pupils: Pupils are equal, round, and reactive to light.   Cardiovascular:      Rate and Rhythm: Normal rate.   Pulmonary:      Effort: Pulmonary effort is normal.   Musculoskeletal:         General: Normal range of motion.      Cervical back: Normal range of motion and neck supple.   Skin:     General: Skin is warm and dry.   Psychiatric:         Mood and Affect: Mood normal.         Speech: Speech normal.         Behavior: Behavior normal. Behavior is cooperative.         Thought Content: Thought content normal.         Judgment: Judgment normal.       Neurological Exam  Mental Status  Awake, alert and oriented to person, place and time. Speech is normal. Language is fluent with no aphasia. Attention and concentration are normal. Fund of knowledge is appropriate for level of education.    Cranial Nerves  CN II: Visual fields full to confrontation.  CN III, IV, VI: Extraocular movements intact bilaterally. Pupils equal round and reactive to light bilaterally.  CN V: Facial sensation is normal.  CN VII: Full and symmetric facial movement.  CN VIII: Slightly hearing impaired, required projection of voice.  CN IX, X: Palate elevates symmetrically  CN XII: Tongue midline without atrophy or " fasciculations.    Motor  Normal muscle bulk throughout. Normal muscle tone.                                               Right                     Left   Shoulder abduction               5                          5   Shoulder adduction               5                          5  Elbow flexion                         5                           Elbow extension                    5                          5  Finger flexion                         5                          5  Hip flexion                              5                          5  Knee flexion                           5                          5  Knee extension                      5                          5  Plantarflexion                         5                          5  Dorsiflexion                            5                          5    Sensory  Light touch is normal in upper and lower extremities.     Coordination  Right: Finger-to-nose normal.Left: Finger-to-nose normal.    Gait    Not assessed.    Relevant Results  Scheduled medications  apixaban, 5 mg, oral, BID  carvedilol, 25 mg, nasogastric tube, BID  folic acid, 1 mg, oral, Daily  insulin lispro, 0-5 Units, subcutaneous, TID  levETIRAcetam, 500 mg, oral, BID  melatonin, 6 mg, oral, Daily  multivitamin with minerals, 1 tablet, oral, Daily  polyethylene glycol, 17 g, oral, Daily  pyridoxine, 50 mg, oral, Daily  QUEtiapine, 25 mg, oral, Nightly  thiamine, 100 mg, oral, Daily  zonisamide, 400 mg, oral, Nightly      Continuous medications     PRN medications  PRN medications: acetaminophen, dextrose, dextrose, glucagon, glucagon      Results for orders placed or performed during the hospital encounter of 09/21/24 (from the past 24 hour(s))   POCT GLUCOSE   Result Value Ref Range    POCT Glucose 114 (H) 74 - 99 mg/dL   POCT GLUCOSE   Result Value Ref Range    POCT Glucose 99 74 - 99 mg/dL   POCT GLUCOSE   Result Value Ref Range    POCT Glucose 87 74 - 99 mg/dL                  [place] Coma Scale  Best Eye Response: Spontaneous  Best Verbal Response: Oriented  Best Motor Response: Follows commands  [place] Coma Scale Score: 15                         Assessment/Plan      Assessment & Plan  Status epilepticus (Multi)    Paddy Solis is a 73 y.o. 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 His family reported recent binge drinking and he was placed on CIWA protocol and admitted to [facility] on 9/21/2024 for close monitoring with EEG and optimization of AED regimen.    On admission, ZNS dosage was increased from 300 mg at bedtime (home regimen) to 400 mg at bedtime along with keppra 500 mg BID. A sitter and restraints were briefly required due agitation from presumed alcohol withdrawal. Since admission, he has had no seizure activity (clinical or vEEG). He was switched from keppra 500mg BID to briviact 50-50 on 9/24/2024 due to concern for increased agitation.    As of 9/25/2024 he was no longer agitated. Most recent seizure most likely a complication of recent binge drinking and altered sleep schedule.    Epileptic Paroxysmal Episodes - Semiology:   1. Automotor > R Version > GTC w/ pos ictal psychosis** (left temporal onset)  2. Automotor seizure (D) (right temporal onset)  - Onset: April of 2021, possibly 1 year earlier (episodes of confusion)  - Frequency: **None since 05/23 prior to recent episode on 09/21  EZ: Bitemporal  Etiology: unknown   Comorbidities: HFrEF, NICM, afib on Eliquis   Prior AEDs: OXC (hyponatremia), Depakote (cognitive side effects)  Current AEDs:  mg at bedtime, keppra XR 1000mg at bedtime --> now ZNS 400mg and keppra instant release (crushed) 500 mg BID.    Assessment and Plan:    #Breakthrough Seizures  #CF status epilepticus  #Bitemporal epilepsy   Assessment:  - CTH: no acute intracranial  abnormality  - S/p 2g Keppra load (~0100, 9/21/24)  - EEG read 09/22: bitemporal epileptogenicity (frequent spikes) but no seizures.  - EEG read 09/23: bitemporal epileptogenicity - no seizures or events seen.  - EEG read 09/24: bitemporal epileptogenicity - no seizures or events seen.  - No reported clinical seizures per nursing staff 9/26/2024     Plan:  - Continue Zonisamide 400mg at bedtime  - Continue Keppra 500 mg PO instant release BID (crushed)  - Continue seroquel dose to 25 mg PO qHS  - Discharge to acute rehabilitation facility pending insurance approval - request escalated 9/27      #Afib  #HFrEF  Assessment:  - Repeat TTE 9/24 with improved EF to 50-55% from 35-40%, severe LA dilatation.      Plan:  - Continue tele while inpatient  - Continue home eliquis 5 mg BID  - Resume coreg (25 mg tablet) given recent high BPs    F: prn (EF 50-55% from 35-40%)  E: prn  N: regular  A: PIV  DVT: eliquis 5 mg bid  GI: N/A    NOK: Nadeen Solis (Spouse)  [phone] (Mobile)   FULL CODE    Palak Lanier MD   Neurology PGY-2

## 2024-09-28 NOTE — ASSESSMENT & PLAN NOTE
Paddy Solis is a 73 y.o. 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 His family reported recent binge drinking and he was placed on CIWA protocol and admitted to NSU on 9/21/2024 for close monitoring with EEG and optimization of AED regimen.    On admission, ZNS dosage was increased from 300 mg at bedtime (home regimen) to 400 mg at bedtime along with keppra 500 mg BID. A sitter and restraints were briefly required due agitation from presumed alcohol withdrawal. Since admission, he has had no seizure activity (clinical or vEEG). He was switched from keppra 500mg BID to briviact 50-50 on 9/24/2024 due to concern for increased agitation.    As of 9/25/2024 he was no longer agitated. Most recent seizure most likely a complication of recent binge drinking and altered sleep schedule.    Epileptic Paroxysmal Episodes - Semiology:   1. Automotor > R Version > GTC w/ pos ictal psychosis** (left temporal onset)  2. Automotor seizure (D) (right temporal onset)  - Onset: April of 2021, possibly 1 year earlier (episodes of confusion)  - Frequency: **None since 05/23 prior to recent episode on 09/21  EZ: Bitemporal  Etiology: unknown   Comorbidities: HFrEF, NICM, afib on Eliquis   Prior AEDs: OXC (hyponatremia), Depakote (cognitive side effects)  Current AEDs:  mg at bedtime, keppra XR 1000mg at bedtime --> now ZNS 400mg and keppra instant release (crushed) 500 mg BID.

## 2024-09-29 VITALS
HEIGHT: 70 IN | HEART RATE: 87 BPM | DIASTOLIC BLOOD PRESSURE: 89 MMHG | OXYGEN SATURATION: 97 % | WEIGHT: 141.31 LBS | RESPIRATION RATE: 16 BRPM | TEMPERATURE: 97 F | SYSTOLIC BLOOD PRESSURE: 138 MMHG | BODY MASS INDEX: 20.23 KG/M2

## 2024-09-29 LAB — GLUCOSE BLD MANUAL STRIP-MCNC: 99 MG/DL (ref 74–99)

## 2024-09-29 PROCEDURE — 1100000001 HC PRIVATE ROOM DAILY

## 2024-09-29 PROCEDURE — 2500000001 HC RX 250 WO HCPCS SELF ADMINISTERED DRUGS (ALT 637 FOR MEDICARE OP)

## 2024-09-29 PROCEDURE — 82947 ASSAY GLUCOSE BLOOD QUANT: CPT

## 2024-09-29 PROCEDURE — 99231 SBSQ HOSP IP/OBS SF/LOW 25: CPT

## 2024-09-29 PROCEDURE — 2500000002 HC RX 250 W HCPCS SELF ADMINISTERED DRUGS (ALT 637 FOR MEDICARE OP, ALT 636 FOR OP/ED)

## 2024-09-29 RX ADMIN — APIXABAN 5 MG: 5 TABLET, FILM COATED ORAL at 20:45

## 2024-09-29 RX ADMIN — THIAMINE HCL TAB 100 MG 100 MG: 100 TAB at 08:37

## 2024-09-29 RX ADMIN — Medication 6 MG: at 20:45

## 2024-09-29 RX ADMIN — LEVETIRACETAM 500 MG: 500 TABLET, FILM COATED ORAL at 00:12

## 2024-09-29 RX ADMIN — ZONISAMIDE 400 MG: 100 CAPSULE ORAL at 20:45

## 2024-09-29 RX ADMIN — CARVEDILOL 25 MG: 25 TABLET, FILM COATED ORAL at 08:37

## 2024-09-29 RX ADMIN — CARVEDILOL 25 MG: 25 TABLET, FILM COATED ORAL at 20:45

## 2024-09-29 RX ADMIN — QUETIAPINE FUMARATE 25 MG: 25 TABLET ORAL at 20:45

## 2024-09-29 RX ADMIN — FOLIC ACID 1 MG: 1 TABLET ORAL at 08:37

## 2024-09-29 RX ADMIN — PYRIDOXINE HCL TAB 50 MG 50 MG: 50 TAB at 08:37

## 2024-09-29 RX ADMIN — LEVETIRACETAM 500 MG: 500 TABLET, FILM COATED ORAL at 23:25

## 2024-09-29 RX ADMIN — Medication 1 TABLET: at 08:37

## 2024-09-29 RX ADMIN — LEVETIRACETAM 500 MG: 500 TABLET, FILM COATED ORAL at 11:23

## 2024-09-29 RX ADMIN — APIXABAN 5 MG: 5 TABLET, FILM COATED ORAL at 08:37

## 2024-09-29 ASSESSMENT — COGNITIVE AND FUNCTIONAL STATUS - GENERAL
CLIMB 3 TO 5 STEPS WITH RAILING: A LITTLE
MOBILITY SCORE: 24
MOBILITY SCORE: 22
DAILY ACTIVITIY SCORE: 24
WALKING IN HOSPITAL ROOM: A LITTLE

## 2024-09-29 ASSESSMENT — PAIN SCALES - GENERAL
PAINLEVEL_OUTOF10: 0 - NO PAIN

## 2024-09-29 ASSESSMENT — PAIN - FUNCTIONAL ASSESSMENT
PAIN_FUNCTIONAL_ASSESSMENT: 0-10

## 2024-09-29 NOTE — ASSESSMENT & PLAN NOTE
Paddy Solis is a 73 y.o. 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 His family reported recent binge drinking and he was placed on CIWA protocol and admitted to NSU on 9/21/2024 for close monitoring with EEG and optimization of AED regimen.    On admission, ZNS dosage was increased from 300 mg at bedtime (home regimen) to 400 mg at bedtime along with keppra 500 mg BID. A sitter and restraints were briefly required due agitation from presumed alcohol withdrawal. Since admission, he has had no seizure activity (clinical or vEEG). He was switched from keppra 500mg BID to briviact 50-50 on 9/24/2024 due to concern for increased agitation.    As of 9/25/2024 he was no longer agitated. Most recent seizure most likely a complication of recent binge drinking and altered sleep schedule.    Epileptic Paroxysmal Episodes - Semiology:   1. Automotor > R Version > GTC w/ pos ictal psychosis** (left temporal onset)  2. Automotor seizure (D) (right temporal onset)  - Onset: April of 2021, possibly 1 year earlier (episodes of confusion)  - Frequency: **None since 05/23 prior to recent episode on 09/21  EZ: Bitemporal  Etiology: unknown   Comorbidities: HFrEF, NICM, afib on Eliquis   Prior AEDs: OXC (hyponatremia), Depakote (cognitive side effects)  Current AEDs:  mg at bedtime, keppra XR 1000mg at bedtime --> now ZNS 400mg and keppra instant release (crushed) 500 mg BID.

## 2024-09-29 NOTE — CARE PLAN
The patient's goals for the shift include  sleep    The clinical goals for the shift include Patient will remain safe and free from injury overnight.    Over the shift, the patient was safe and free from injury, and had a stable exam. Patient denied pain overnight.  Problem: Pain - Adult  Goal: Verbalizes/displays adequate comfort level or baseline comfort level  Outcome: Progressing     Problem: Safety - Adult  Goal: Free from fall injury  Outcome: Progressing     Problem: Discharge Planning  Goal: Discharge to home or other facility with appropriate resources  Outcome: Progressing     Problem: Chronic Conditions and Co-morbidities  Goal: Patient's chronic conditions and co-morbidity symptoms are monitored and maintained or improved  Outcome: Progressing

## 2024-09-29 NOTE — PROGRESS NOTES
"Paddy Solis is a 74 y.o. male on day 8 of admission presenting with Status epilepticus (Multi).      Subjective   No events overnight.    Patient frustrated by having to stay in the hospital for so long awaiting rehab, but understanding of the circumstances.        Objective     Last Recorded Vitals  Blood pressure 136/72, pulse 85, temperature 36.8 °C (98.2 °F), temperature source Temporal, resp. rate 16, height 1.778 m (5' 10\"), weight 64.1 kg (141 lb 5 oz), SpO2 97%.    Physical Exam  Constitutional:       Appearance: Normal appearance. He is normal weight.   HENT:      Head: Normocephalic and atraumatic.   Eyes:      Extraocular Movements: Extraocular movements intact.      Conjunctiva/sclera: Conjunctivae normal.      Pupils: Pupils are equal, round, and reactive to light.   Cardiovascular:      Rate and Rhythm: Normal rate.   Pulmonary:      Effort: Pulmonary effort is normal.   Musculoskeletal:         General: Normal range of motion.      Cervical back: Normal range of motion and neck supple.   Skin:     General: Skin is warm and dry.   Neurological:      Cranial Nerves: No dysarthria.   Psychiatric:         Mood and Affect: Mood normal.         Behavior: Behavior normal. Behavior is cooperative.         Thought Content: Thought content normal.         Judgment: Judgment normal.       Neurological Exam  Mental Status  Awake, alert and oriented to person, place and time. no dysarthria present. Language is fluent with no aphasia.    Cranial Nerves  CN II: Visual fields full to confrontation.  CN III, IV, VI: Extraocular movements intact bilaterally. Pupils equal round and reactive to light bilaterally.  CN V: Facial sensation is normal.  CN VII: Full and symmetric facial movement.  CN VIII: Slightly hearing impaired, required projection of voice.  CN IX, X: Palate elevates symmetrically  CN XII: Tongue midline without atrophy or fasciculations.    Motor  Normal muscle bulk throughout. Normal muscle " tone.                                               Right                     Left   Shoulder abduction               5                          5   Shoulder adduction               5                          5  Elbow flexion                         5                          5  Elbow extension                    5                          5  Finger flexion                         5                          5  Hip flexion                              5                          5  Knee flexion                           5                          5  Knee extension                      5                          5  Plantarflexion                         5                          5  Dorsiflexion                            5                          5    Sensory  Light touch is normal in upper and lower extremities.     Coordination  Right: Finger-to-nose normal.Left: Finger-to-nose normal.    Gait    Not assessed.    Relevant Results  Scheduled medications  apixaban, 5 mg, oral, BID  carvedilol, 25 mg, nasogastric tube, BID  folic acid, 1 mg, oral, Daily  insulin lispro, 0-5 Units, subcutaneous, TID  levETIRAcetam, 500 mg, oral, BID  melatonin, 6 mg, oral, Daily  multivitamin with minerals, 1 tablet, oral, Daily  polyethylene glycol, 17 g, oral, Daily  pyridoxine, 50 mg, oral, Daily  QUEtiapine, 25 mg, oral, Nightly  thiamine, 100 mg, oral, Daily  zonisamide, 400 mg, oral, Nightly      Continuous medications     PRN medications  PRN medications: acetaminophen, dextrose, dextrose, glucagon, glucagon      Results for orders placed or performed during the hospital encounter of 09/21/24 (from the past 24 hour(s))   POCT GLUCOSE   Result Value Ref Range    POCT Glucose 87 74 - 99 mg/dL   POCT GLUCOSE   Result Value Ref Range    POCT Glucose 98 74 - 99 mg/dL   POCT GLUCOSE   Result Value Ref Range    POCT Glucose 99 74 - 99 mg/dL                 [place] Coma Scale  Best Eye Response: Spontaneous  Best Verbal Response:  Oriented  Best Motor Response: Follows commands  [place] Coma Scale Score: 15                         Assessment/Plan      Assessment & Plan  Status epilepticus (Multi)    Paddy Solis is a 73 y.o. 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 His family reported recent binge drinking and he was placed on CIWA protocol and admitted to NSU on 9/21/2024 for close monitoring with EEG and optimization of AED regimen.    On admission, ZNS dosage was increased from 300 mg at bedtime (home regimen) to 400 mg at bedtime along with keppra 500 mg BID. A sitter and restraints were briefly required due agitation from presumed alcohol withdrawal. Since admission, he has had no seizure activity (clinical or vEEG). He was switched from keppra 500mg BID to briviact 50-50 on 9/24/2024 due to concern for increased agitation.    As of 9/25/2024 he was no longer agitated. Most recent seizure most likely a complication of recent binge drinking and altered sleep schedule.    Epileptic Paroxysmal Episodes - Semiology:   1. Automotor > R Version > GTC w/ pos ictal psychosis** (left temporal onset)  2. Automotor seizure (D) (right temporal onset)  - Onset: April of 2021, possibly 1 year earlier (episodes of confusion)  - Frequency: **None since 05/23 prior to recent episode on 09/21  EZ: Bitemporal  Etiology: unknown   Comorbidities: HFrEF, NICM, afib on Eliquis   Prior AEDs: OXC (hyponatremia), Depakote (cognitive side effects)  Current AEDs:  mg at bedtime, keppra XR 1000mg at bedtime --> now ZNS 400mg and keppra instant release (crushed) 500 mg BID.    Assessment and Plan:    #Breakthrough Seizures  #CF status epilepticus  #Bitemporal epilepsy   Assessment:  - CTH: no acute intracranial abnormality  - S/p 2g Keppra load (~0100, 9/21/24)  - EEG read 09/22: bitemporal  epileptogenicity (frequent spikes) but no seizures.  - EEG read 09/23: bitemporal epileptogenicity - no seizures or events seen.  - EEG read 09/24: bitemporal epileptogenicity - no seizures or events seen.  - No reported clinical seizures per nursing staff 9/26/2024     Plan:  - Continue Zonisamide 400mg at bedtime  - Continue Keppra 500 mg PO instant release BID (crushed)  - Continue seroquel dose to 25 mg PO qHS  - Discharge to acute rehabilitation facility pending insurance approval - request escalated 9/27      #Afib  #HFrEF  Assessment:  - Repeat TTE 9/24 with improved EF to 50-55% from 35-40%, severe LA dilatation.      Plan:  - Continue tele while inpatient  - Continue home eliquis 5 mg BID  - Resume coreg (25 mg tablet) given recent high BPs    F: prn (EF 50-55% from 35-40%)  E: prn  N: regular  A: PIV  DVT: eliquis 5 mg bid  GI: N/A    NOK: Nadeen Solis (Spouse)  [phone] (Mobile)   FULL CODE    Palak Lanier MD   Neurology PGY-2

## 2024-09-29 NOTE — PROGRESS NOTES
24 1244   Discharge Planning   Living Arrangements Spouse/significant other   Support Systems Spouse/significant other   Type of Residence Private residence   Home or Post Acute Services Post acute facilities (Rehab/SNF/etc)   Type of Post Acute Facility Services Rehab   Expected Discharge Disposition Rehab   Does the patient need discharge transport arranged? Yes   RoundTrip coordination needed? Yes   Has discharge transport been arranged? Yes     [facility] has offered a P2P for Irma's IRF request. Deadline is tomorrow () at 12:00pm. Phone # [phone] Option #5    [facility] Status: P[facility]  [id number]  Dates: 2024    [facility]  [address] Paddy MRN [id number]   1950  [facility] # [id number]   Kavya PINA

## 2024-09-29 NOTE — CARE PLAN
The clinical goals for the shift include safety, comfort    Patient oriented x4, denies pain, vitals WNL. Anticipated discharge to rehab, precert still pending at this time. Patient and family updated and agreeable to plan of care at this time.      Problem: Skin  Goal: Participates in plan/prevention/treatment measures  Outcome: Progressing  Goal: Prevent/manage excess moisture  Outcome: Progressing  Goal: Prevent/minimize sheer/friction injuries  Outcome: Progressing  Goal: Promote/optimize nutrition  Outcome: Progressing  Goal: Promote skin healing  Outcome: Progressing     Problem: Pain - Adult  Goal: Verbalizes/displays adequate comfort level or baseline comfort level  Outcome: Progressing     Problem: Safety - Adult  Goal: Free from fall injury  Outcome: Progressing     Problem: Discharge Planning  Goal: Discharge to home or other facility with appropriate resources  Outcome: Progressing     Problem: Chronic Conditions and Co-morbidities  Goal: Patient's chronic conditions and co-morbidity symptoms are monitored and maintained or improved  Outcome: Progressing

## 2024-09-30 VITALS
DIASTOLIC BLOOD PRESSURE: 91 MMHG | WEIGHT: 141.31 LBS | TEMPERATURE: 97.7 F | RESPIRATION RATE: 16 BRPM | SYSTOLIC BLOOD PRESSURE: 130 MMHG | OXYGEN SATURATION: 97 % | HEIGHT: 70 IN | HEART RATE: 88 BPM | BODY MASS INDEX: 20.23 KG/M2

## 2024-09-30 PROCEDURE — 2500000001 HC RX 250 WO HCPCS SELF ADMINISTERED DRUGS (ALT 637 FOR MEDICARE OP)

## 2024-09-30 RX ORDER — QUETIAPINE FUMARATE 25 MG/1
25 TABLET, FILM COATED ORAL NIGHTLY
Qty: 30 TABLET | Refills: 0 | Status: SHIPPED | OUTPATIENT
Start: 2024-09-30 | End: 2024-10-30

## 2024-09-30 RX ORDER — LEVETIRACETAM 500 MG/1
500 TABLET ORAL 2 TIMES DAILY
Qty: 60 TABLET | Refills: 1 | Status: SHIPPED | OUTPATIENT
Start: 2024-09-30 | End: 2024-11-29

## 2024-09-30 RX ORDER — ZONISAMIDE 100 MG/1
400 CAPSULE ORAL DAILY
Qty: 240 CAPSULE | Refills: 0 | Status: SHIPPED | OUTPATIENT
Start: 2024-09-30 | End: 2024-11-29

## 2024-09-30 RX ADMIN — PYRIDOXINE HCL TAB 50 MG 50 MG: 50 TAB at 08:45

## 2024-09-30 RX ADMIN — Medication 1 TABLET: at 08:44

## 2024-09-30 RX ADMIN — THIAMINE HCL TAB 100 MG 100 MG: 100 TAB at 08:45

## 2024-09-30 RX ADMIN — APIXABAN 5 MG: 5 TABLET, FILM COATED ORAL at 08:45

## 2024-09-30 RX ADMIN — CARVEDILOL 25 MG: 25 TABLET, FILM COATED ORAL at 08:45

## 2024-09-30 RX ADMIN — LEVETIRACETAM 500 MG: 500 TABLET, FILM COATED ORAL at 10:31

## 2024-09-30 RX ADMIN — FOLIC ACID 1 MG: 1 TABLET ORAL at 08:44

## 2024-09-30 ASSESSMENT — PAIN SCALES - GENERAL: PAINLEVEL_OUTOF10: 0 - NO PAIN

## 2024-09-30 ASSESSMENT — COGNITIVE AND FUNCTIONAL STATUS - GENERAL
DAILY ACTIVITIY SCORE: 24
MOBILITY SCORE: 24

## 2024-09-30 ASSESSMENT — PAIN - FUNCTIONAL ASSESSMENT: PAIN_FUNCTIONAL_ASSESSMENT: 0-10

## 2024-09-30 NOTE — PROGRESS NOTES
Occupational Therapy                 Therapy Communication Note    Patient Name: Paddy Solis  MRN: [id number]  Department: [place] [address]  Room: [address] [place] [address]  Today's Date: 9/30/2024     Discipline: Occupational Therapy    Missed Visit Reason:  (Pt to d/c home at 1600 per RN.)    Missed Time: 1542 Attempt

## 2024-09-30 NOTE — DISCHARGE INSTRUCTIONS
"Dear Mr. Solis,    You were admitted at [facility] from 09/21/24-09/30/24 due to you being in a continuous state of seizing, called status epilepticus. During your admission, you had to be intubated due to low blood pressure and increasing confusion, so much that there was concern for your airway. You were started on multiple sedation medications to help control your confusion and seizures. As well, we obtained EEG (wires on your head) to help understand how you were responding to the anti-epileptic medications we were giving you. We saw a decrease in seizures when we restarted your home Keppra and increased your zonisamide dose to 400 mg, this correlated with improvement in your mentation. When the medical team felt you were safe enough, you were extubated (tube out) and taken off of sedation. You greatly improved afterwards and continued to increase your strength. The physical therapy team feels safe enough for you to be home and continue getting stronger with at home PT.     Changes in Medications:  - Increase Zonisamide 300 mg--->400 mg nightly  - Change keppra 1000 mg daily-->500 mg BID  - Start Seroquel 25 mg every night, please follow up with PCP to continue this medication.     At this time, we believe your breakthrough seizures were due to a self-reported \"binge drinking\" behavior. We highly recommend you abstain from alcohol and continue to take care of your self physically and mentally for your optimized healthcare.     Sincerely,    Your [facility] Epilepsy Team   "

## 2024-09-30 NOTE — CARE PLAN
The clinical goals for the shift include safety, comfort, discharge planning    Patient oriented x4, denies pain, vitals WNL. Ambulatory standby assist tolerates well. Awaiting precert for rehab prior to discharge. Patient updated and agreeable to plan of care.      Problem: Skin  Goal: Participates in plan/prevention/treatment measures  Outcome: Progressing  Goal: Prevent/manage excess moisture  Outcome: Progressing  Goal: Prevent/minimize sheer/friction injuries  Outcome: Progressing  Goal: Promote/optimize nutrition  Outcome: Progressing  Goal: Promote skin healing  Outcome: Progressing     Problem: Pain - Adult  Goal: Verbalizes/displays adequate comfort level or baseline comfort level  Outcome: Progressing     Problem: Safety - Adult  Goal: Free from fall injury  Outcome: Progressing     Problem: Discharge Planning  Goal: Discharge to home or other facility with appropriate resources  Outcome: Progressing     Problem: Chronic Conditions and Co-morbidities  Goal: Patient's chronic conditions and co-morbidity symptoms are monitored and maintained or improved  Outcome: Progressing

## 2024-09-30 NOTE — ASSESSMENT & PLAN NOTE
Paddy Solis is a 73 y.o. 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 His family reported recent binge drinking and he was placed on CIWA protocol and admitted to NSU on 9/21/2024 for close monitoring with EEG and optimization of AED regimen.    On admission, ZNS dosage was increased from 300 mg at bedtime (home regimen) to 400 mg at bedtime along with keppra 500 mg BID. A sitter and restraints were briefly required due agitation from presumed alcohol withdrawal. Since admission, he has had no seizure activity (clinical or vEEG). He was switched from keppra 500mg BID to briviact 50-50 on 9/24/2024 due to concern for increased agitation. This has since been changed back to home dose keppra 500 mg BID    As of 9/25/2024 he was no longer agitated. Most recent seizure most likely a complication of recent binge drinking and altered sleep schedule.    Epileptic Paroxysmal Episodes - Semiology:   1. Automotor > R Version > GTC w/ pos ictal psychosis** (left temporal onset)  2. Automotor seizure (D) (right temporal onset)  - Onset: April of 2021, possibly 1 year earlier (episodes of confusion)  - Frequency: **None since 05/23 prior to recent episode on 09/21  EZ: Bitemporal  Etiology: unknown   Comorbidities: HFrEF, NICM, afib on Eliquis   Prior AEDs: OXC (hyponatremia), Depakote (cognitive side effects)  Current AEDs:  mg at bedtime, keppra XR 1000mg at bedtime --> now ZNS 400mg and keppra instant release (crushed) 500 mg BID.

## 2024-09-30 NOTE — PROGRESS NOTES
Discharge instructions reviewed with patient at bedside. Discussed new medications, post op care/incision care instructions, and follow up appointments. Patient verbalizes understanding, all questions answered at this time. IV removed, awaiting transport to discharge home with spouse.

## 2024-09-30 NOTE — PROGRESS NOTES
"Paddy Solis is a 74 y.o. male on day 9 of admission presenting with Status epilepticus (Multi).      Subjective   No events overnight.    PT changed rec for patient, rec now low (either outpatient or home) PT. Will discuss with patient the option of going directly home.     P2P attempted this AM for acute rehab, insurance did not , Dr. Vazquez Barron left message.        Objective     Last Recorded Vitals  Blood pressure (!) 129/95, pulse 89, temperature 36.3 °C (97.3 °F), temperature source Temporal, resp. rate 16, height 1.778 m (5' 10\"), weight 64.1 kg (141 lb 5 oz), SpO2 96%.    Physical Exam  Constitutional:       Appearance: Normal appearance. He is normal weight.   HENT:      Head: Normocephalic and atraumatic.   Eyes:      Extraocular Movements: Extraocular movements intact.      Conjunctiva/sclera: Conjunctivae normal.      Pupils: Pupils are equal, round, and reactive to light.   Cardiovascular:      Rate and Rhythm: Normal rate.   Pulmonary:      Effort: Pulmonary effort is normal.   Musculoskeletal:         General: Normal range of motion.      Cervical back: Normal range of motion and neck supple.   Skin:     General: Skin is warm and dry.   Neurological:      Cranial Nerves: No dysarthria.   Psychiatric:         Mood and Affect: Mood normal.         Behavior: Behavior normal. Behavior is cooperative.         Thought Content: Thought content normal.         Judgment: Judgment normal.       Neurological Exam  Mental Status  Awake, alert and oriented to person, place and time. no dysarthria present. Language is fluent with no aphasia.    Cranial Nerves  CN II: Visual fields full to confrontation.  CN III, IV, VI: Extraocular movements intact bilaterally. Pupils equal round and reactive to light bilaterally.  CN V: Facial sensation is normal.  CN VII: Full and symmetric facial movement.  CN VIII: Slightly hearing impaired, required projection of voice.  CN IX, X: Palate elevates symmetrically  CN " XII: Tongue midline without atrophy or fasciculations.    Motor  Normal muscle bulk throughout. Normal muscle tone.                                               Right                     Left   Shoulder abduction               5                          5   Shoulder adduction               5                          5  Elbow flexion                         5                          5  Elbow extension                    5                          5  Finger flexion                         5                          5  Hip flexion                              5                          5  Knee flexion                           5                          5  Knee extension                      5                          5  Plantarflexion                         5                          5  Dorsiflexion                            5                          5    Sensory  Light touch is normal in upper and lower extremities.     Coordination  Right: Finger-to-nose normal.Left: Finger-to-nose normal.    Gait    Not assessed.    Relevant Results  Scheduled medications  apixaban, 5 mg, oral, BID  carvedilol, 25 mg, nasogastric tube, BID  folic acid, 1 mg, oral, Daily  levETIRAcetam, 500 mg, oral, BID  melatonin, 6 mg, oral, Daily  multivitamin with minerals, 1 tablet, oral, Daily  polyethylene glycol, 17 g, oral, Daily  pyridoxine, 50 mg, oral, Daily  QUEtiapine, 25 mg, oral, Nightly  thiamine, 100 mg, oral, Daily  zonisamide, 400 mg, oral, Nightly      Continuous medications     PRN medications  PRN medications: acetaminophen, dextrose, dextrose, glucagon, glucagon      No results found for this or any previous visit (from the past 24 hour(s)).                Elizabeth Coma Scale  Best Eye Response: Spontaneous  Best Verbal Response: Oriented  Best Motor Response: Follows commands  Devon Coma Scale Score: 15                         Assessment/Plan      Assessment & Plan  Status epilepticus (Multi)    Paddy Solis is a  73 y.o. 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 His family reported recent binge drinking and he was placed on CIWA protocol and admitted to NSU on 9/21/2024 for close monitoring with EEG and optimization of AED regimen.    On admission, ZNS dosage was increased from 300 mg at bedtime (home regimen) to 400 mg at bedtime along with keppra 500 mg BID. A sitter and restraints were briefly required due agitation from presumed alcohol withdrawal. Since admission, he has had no seizure activity (clinical or vEEG). He was switched from keppra 500mg BID to briviact 50-50 on 9/24/2024 due to concern for increased agitation. This has since been changed back to home dose keppra 500 mg BID    As of 9/25/2024 he was no longer agitated. Most recent seizure most likely a complication of recent binge drinking and altered sleep schedule.    Epileptic Paroxysmal Episodes - Semiology:   1. Automotor > R Version > GTC w/ pos ictal psychosis** (left temporal onset)  2. Automotor seizure (D) (right temporal onset)  - Onset: April of 2021, possibly 1 year earlier (episodes of confusion)  - Frequency: **None since 05/23 prior to recent episode on 09/21  EZ: Bitemporal  Etiology: unknown   Comorbidities: HFrEF, NICM, afib on Eliquis   Prior AEDs: OXC (hyponatremia), Depakote (cognitive side effects)  Current AEDs:  mg at bedtime, keppra XR 1000mg at bedtime --> now ZNS 400mg and keppra instant release (crushed) 500 mg BID.    Assessment and Plan:    #Breakthrough Seizures  #CF status epilepticus  #Bitemporal epilepsy   Assessment:  - CTH: no acute intracranial abnormality  - S/p 2g Keppra load (~0100, 9/21/24)  - EEG read 09/22: bitemporal epileptogenicity (frequent spikes) but no seizures.  - EEG read 09/23: bitemporal epileptogenicity - no seizures or events  seen.  - EEG read 09/24: bitemporal epileptogenicity - no seizures or events seen.  - No reported clinical seizures per nursing staff 9/26/2024     Plan:  - Continue Zonisamide 400mg at bedtime  - Continue Keppra 500 mg PO instant release BID (crushed)  - Continue seroquel dose to 25 mg PO qHS  - Discharge to home vs acute rehab.      #Afib  #HFrEF  Assessment:  - Repeat TTE 9/24 with improved EF to 50-55% from 35-40%, severe LA dilatation.      Plan:  - Continue tele while inpatient  - Continue home eliquis 5 mg BID  - Resume coreg (25 mg tablet) given recent high BPs    F: prn (EF 50-55% from 35-40%)  E: prn  N: regular  A: PIV  DVT: eliquis 5 mg bid  GI: N/A    NOK: IrmaNadeen (Spouse)  [phone] (Mobile)   FULL CODE    Jose Mancini, DO   Neurology PGY-1

## 2024-09-30 NOTE — PROGRESS NOTES
Physical Therapy                 Therapy Communication Note    Patient Name: Paddy Solis  MRN: [id number]  Department: [place] [id number]  Room: [address] [place] [address]  Today's Date: 9/30/2024     Discipline: Physical Therapy    Missed Visit Reason: Missed Visit Reason: Other (Comment) (updated DC rec and PT frequency as noted; team updated and d/w treating PTA today)    Missed Time: Attempt    Comment: 11:25am

## 2024-09-30 NOTE — DISCHARGE SUMMARY
"Discharge Diagnosis  Status epilepticus (Multi)    Epilepsy Quality and Core Measure Topics  The patient was encouraged to keep a seizure diary  The patient was encouraged to practice good sleep hygiene  The risks and benefits of pertinent medications were discussed with the patient and/or family  Addressed all relevant psychiatric comorbidities  First Time Seizures  No this is not the patient's first seizure  Is this patient seizure free?  Yes, no treatment changes at this time  Should this patient observe standard seizure precautions?  Yes Reviewed seizure precautions with patient; specifically, the patient may not drive, may not operate heavy machinery, ought not swim unsupervised, should shower rather than bath, should be cautious with hot or heavy objects, and should not perform any activities at heights such as on a ladder. This will be re-assessed at the patient's next appointment.   Medication Side Effects Discussion Statement  The risks and benefits of pertinent medications were discussed with the patient and/or kin.    Issues Requiring Follow-Up  Follow up with PCP in regards to Seroquel for agitation.   PT home for continued strength s/p NCU stay and status epilepticus.     Test Results Pending At Discharge  Pending Labs       Order Current Status    Extra Urine Gray Tube Collected (09/21/24 1027)    Urinalysis with Reflex Culture and Microscopic In process            Hospital Course  Paddy Solis is a 73 y.o. 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      History of Present Illness:   Per ED note at [place]:   \"Sent in from home by EMS with report of witnessed seizure. According to EMS the patient had a seizure at home. EMS was called on scene the patient had 2 more seizures. He was given 5 mg of Versed IV push and route. He remains " "postictal upon arrival. \"     On arrival to [place] ED:  /82, , SPO2 83%, RR 30  He was intubated and BP post intubation was 91/50, given hypotension, there was c/f sepsis so blood cultures were collected and pt was started on vanc/zosyn  WBC 9.3, RFP/LFTs wnl, troponin 11  Covid negative  Lactate 5.2 -> 1.4  Utox positive for cannabinoids, fentanyl, benzodiazepines (utox sent after Versed push and fent push for intubation)  Serum tox negative, alcohol level <10  Keppra, ZNS levels pending  CTH without acute intracranial abnormalities     ED intervention: 2g keppra load, intubated for airway protection, on prop/fent for sedation, started vanc/zosyn and sent blood cultures x2     Pt remained sedated and unresponsive post intubation without clinical seizure-like activity. Pt transferred to [facility] for EEG monitoring to rule out subclinical status epilepticus.     On arrival to [facility], pt remains intubated and sedated, on prop/fent. Per RN, pt noted to have subtle generalized twitching and R gaze deviation c/f seizures, so was bolused prop and increased to 50 mcg/kg/min with subsequent resolution.      History obtained from telephone call with pt's wife. She reports that pt woke up at ~0600 today and she noted he was sweating, then went back to sleep. He woke up later this morning then let out a cry, prompting his wife to go into his room and noted that he was unresponsive, staring straight ahead with generalized shaking. She described the shaking as twitching rather than generalized convulsions and noted that this was similar to his usual seizures but more mild. Otherwise, she denied tongue biting, urinary/bowel incontinence. This lasted ~1 min and she noted that he was very drowsy and lethargic afterwards. This prompted her to call EMS and pt was taken to [place].      Per wife, pt's seizures have been very well controlled on current AED regimen with last seizure occurring >1 year ago (05/2023). He has been " compliant with his meds and hasn't missed any doses however, she notes that he had split up his Keppra dose and was taking 500mg twice a day instead of 1000mg at bedtime. Pt usually manages his meds on his own so she is unsure how long he has been doing this but she estimates ~1 month and stated that he reportedly did not tolerate taking the 1000mg at bedtime. She also reports that he has a history of binge drinking and may have been drinking this past week as she has been out of town, with his last drink on 9/19. She also notes that he has an erratic sleep schedule, which she suspects may have contributed. Otherwise, she denied recent fever/chills, med changes, URI/UTI symptoms.     Patient is in NSU sedated and intubated on 9/22. Patient continued to be weaned off sedation and oxygen requirements, he was extubated 09/24 and sedation requirements discontinued 09/25. EEG was unremarkable and discontinued on 09/24 with no concerns of continued seizures. The patients affect and strength greatly improved on 09/25 and was assessed by PT/OT, recommending high intensity rehab. The patient remained at [facility] awaiting precert, and while waiting the patient was reassessed and progessed greatly and the PT team changed recommendations to home PT. The patient was discharged on 09/30 to home.    Changes in Anti-seizure Medications:  - Increase Zonisamide 300 mg--->400 mg nightly  - Change keppra 1000 mg daily-->500 mg BID    Epileptic Paroxysmal Episodes - Semiology:   1. Automotor > R Version > GTC w/ pos ictal psychosis** (left temporal onset)  2. Automotor seizure (D) (right temporal onset)  - Onset: April of 2021, possibly 1 year earlier (episodes of confusion)  - Frequency: **None since 05/23 prior to recent episode on 09/21  EZ: Bitemporal  Etiology: unknown   Comorbidities: HFrEF, NICM, afib on Eliquis   Prior AEDs: OXC (hyponatremia), Depakote (cognitive side effects)  Current AEDs:  mg at bedtime, keppra XR 1000mg  "at bedtime --> now ZNS 400mg and keppra instant release (crushed) 500 mg BID.    Pertinent Physical Exam At Time of Discharge  Physical Exam  Last Recorded Vitals  Blood pressure (!) 129/95, pulse 89, temperature 36.3 °C (97.3 °F), temperature source Temporal, resp. rate 16, height 1.778 m (5' 10\"), weight 64.1 kg (141 lb 5 oz), SpO2 96%.     Physical Exam  Constitutional:       Appearance: Normal appearance. He is normal weight.   HENT:      Head: Normocephalic and atraumatic.   Eyes:      Extraocular Movements: Extraocular movements intact.      Conjunctiva/sclera: Conjunctivae normal.      Pupils: Pupils are equal, round, and reactive to light.   Cardiovascular:      Rate and Rhythm: Normal rate.   Pulmonary:      Effort: Pulmonary effort is normal.   Musculoskeletal:         General: Normal range of motion.      Cervical back: Normal range of motion and neck supple.   Skin:     General: Skin is warm and dry.   Neurological:      Cranial Nerves: No dysarthria.   Psychiatric:         Mood and Affect: Mood normal.         Behavior: Behavior normal. Behavior is cooperative.         Thought Content: Thought content normal.         Judgment: Judgment normal.         Neurological Exam  Mental Status  Awake, alert and oriented to person, place and time. no dysarthria present. Language is fluent with no aphasia.     Cranial Nerves  CN II: Visual fields full to confrontation.  CN III, IV, VI: Extraocular movements intact bilaterally. Pupils equal round and reactive to light bilaterally.  CN V: Facial sensation is normal.  CN VII: Full and symmetric facial movement.  CN VIII: Slightly hearing impaired, required projection of voice.  CN IX, X: Palate elevates symmetrically  CN XII: Tongue midline without atrophy or fasciculations.     Motor  Normal muscle bulk throughout. Normal muscle tone.                                                Right                     Left   Shoulder abduction               5                     "      5   Shoulder adduction               5                          5  Elbow flexion                         5                          5  Elbow extension                    5                          5  Finger flexion                         5                          5  Hip flexion                              5                          5  Knee flexion                           5                          5  Knee extension                      5                          5  Plantarflexion                         5                          5  Dorsiflexion                            5                          5     Sensory  Light touch is normal in upper and lower extremities.      Coordination  Right: Finger-to-nose normal.Left: Finger-to-nose normal.     Gait     Not assessed.  Home Medications     Medication List      START taking these medications     levETIRAcetam 500 mg tablet; Commonly known as: Keppra; Take 1 tablet   (500 mg) by mouth 2 times a day.; Replaces: levETIRAcetam  mg 24 hr   tablet   QUEtiapine 25 mg tablet; Commonly known as: SEROquel; Take 1 tablet (25   mg) by mouth once daily at bedtime.     CHANGE how you take these medications     zonisamide 100 mg capsule; Commonly known as: Zonegran; Take 4 capsules   (400 mg) by mouth once daily.; What changed: how much to take     CONTINUE taking these medications     aspirin 81 mg chewable tablet; Chew 1 tablet (81 mg) once daily.   carvedilol 25 mg tablet; Commonly known as: Coreg; TAKE 1 TABLET BY   MOUTH TWICE  DAILY   Eliquis 5 mg tablet; Generic drug: apixaban; TAKE 1 TABLET BY MOUTH   TWICE  DAILY   escitalopram 5 mg tablet; Commonly known as: Lexapro; Take 1 tablet (5   mg) by mouth once daily.   magnesium oxide 400 mg (241.3 mg magnesium) tablet; Commonly known as:   Mag-Ox   pyridoxine 50 mg tablet; Commonly known as: Vitamin B-6; Take 1 tablet   (50 mg) by mouth once daily.     STOP taking these medications     levETIRAcetam  mg 24  hr tablet; Commonly known as: Keppra XR;   Replaced by: levETIRAcetam 500 mg tablet       Outpatient Follow-Up  Future Appointments   Date Time Provider Department Center   10/30/2024  7:45 AM Garry Short MD [id number] [place]   11/7/2024  9:00 AM Mirna Giraldo, APRN-CNP [id number] [facility]       Jose Mancini DO

## 2024-10-01 ENCOUNTER — HOME HEALTH ADMISSION (OUTPATIENT)
Dept: HOME HEALTH SERVICES | Facility: HOME HEALTH | Age: 74
End: 2024-10-01
Payer: MEDICARE

## 2024-10-02 ENCOUNTER — PATIENT OUTREACH (OUTPATIENT)
Dept: PRIMARY CARE | Facility: CLINIC | Age: 74
End: 2024-10-02
Payer: MEDICARE

## 2024-10-02 ENCOUNTER — HOME CARE VISIT (OUTPATIENT)
Dept: HOME HEALTH SERVICES | Facility: HOME HEALTH | Age: 74
End: 2024-10-02
Payer: MEDICARE

## 2024-10-02 VITALS
SYSTOLIC BLOOD PRESSURE: 118 MMHG | DIASTOLIC BLOOD PRESSURE: 68 MMHG | TEMPERATURE: 97.9 F | RESPIRATION RATE: 14 BRPM | HEART RATE: 64 BPM | OXYGEN SATURATION: 98 %

## 2024-10-02 PROCEDURE — G0151 HHCP-SERV OF PT,EA 15 MIN: HCPCS

## 2024-10-02 SDOH — HEALTH STABILITY: PHYSICAL HEALTH: PHYSICAL EXERCISE: 15

## 2024-10-02 SDOH — HEALTH STABILITY: PHYSICAL HEALTH: EXERCISE ACTIVITY: ANKLE DF/PF

## 2024-10-02 SDOH — HEALTH STABILITY: PHYSICAL HEALTH: PHYSICAL EXERCISE: SEATED

## 2024-10-02 SDOH — HEALTH STABILITY: PHYSICAL HEALTH: EXERCISE ACTIVITIES SETS: 1

## 2024-10-02 SDOH — HEALTH STABILITY: PHYSICAL HEALTH: EXERCISE ACTIVITY: MARCHING

## 2024-10-02 SDOH — HEALTH STABILITY: PHYSICAL HEALTH: EXERCISE TYPE: LE EXERCISES

## 2024-10-02 SDOH — HEALTH STABILITY: PHYSICAL HEALTH: EXERCISE ACTIVITY: LAQ

## 2024-10-02 ASSESSMENT — BALANCE ASSESSMENTS
ARISES: 1 - ABLE, USES ARMS TO HELP
SITTING DOWN: 1 - USES ARMS OR NOT SMOOTH MOTION
TURNING 360 DEGREES STEPS: 1 - CONTINUOUS STEPS
IMMEDIATE STANDING BALANCE FIRST 5 SECONDS: 2 - STEADY WITHOUT WALKER OR OTHER SUPPORT
EYES CLOSED AT MAXIMUM POSITION NUDGED: 1 - STEADY
ARISING SCORE: 1
BALANCE SCORE: 13
ATTEMPTS TO ARISE: 2 - ABLE TO RISE, ONE ATTEMPT
NUDGED: 2 - STEADY
SITTING BALANCE: 1 - STEADY, SAFE
STANDING BALANCE: 1 - STEADY BUT WIDE STANCE AND USES CANE OR OTHER SUPPORT
NUDGED SCORE: 2

## 2024-10-02 ASSESSMENT — GAIT ASSESSMENTS
INITIATION OF GAIT IMMEDIATELY AFTER GO: 1 - NO HESITANCY
GAIT SCORE: 11
BALANCE AND GAIT SCORE: 24
STEP SYMMETRY: 1 - RIGHT AND LEFT STEP LENGTH APPEAR EQUAL
STEP CONTINUITY: 1 - STEPS APPEAR CONTINUOUS
PATH SCORE: 2
TRUNK: 1 - NO SWAY BUT FLEXION OF KNEES OR BACK OR SPREADS ARMS WHILE WALKING
TRUNK SCORE: 1
PATH: 2 - STRAIGHT WITHOUT WALKING AID
WALKING STANCE: 1 - HEELS ALMOST TOUCHING WHILE WALKING

## 2024-10-02 ASSESSMENT — ACTIVITIES OF DAILY LIVING (ADL)
AMBULATION ASSISTANCE ON FLAT SURFACES: 1
ENTERING_EXITING_HOME: STAND BY ASSIST
OASIS_M1830: 03
AMBULATION_DISTANCE/DURATION_TOLERATED: 100 FT

## 2024-10-02 ASSESSMENT — ENCOUNTER SYMPTOMS
HYPERTENSION: 1
DENIES PAIN: 1
PERSON REPORTING PAIN: PATIENT
MUSCLE WEAKNESS: 1
OCCASIONAL FEELINGS OF UNSTEADINESS: 1

## 2024-10-02 NOTE — PROGRESS NOTES
Discharge Facility:  [facility]   Discharge Diagnosis:  Status epilepticus (Multi)   Admission Date:  9/21/24   Discharge Date:   9/30/24     PCP Appointment Date:  TBD- no open slots    Specialist Appointment Date:   cardio 10/30/24   11/7/24 neuro   Hospital Encounter and Summary Linked: Yes  See discharge assessment below for further details    Medications  Medications reviewed with patient/caregiver?: Yes (CM spoke with spouse) (10/2/2024 10:08 AM)  Is the patient having any side effects they believe may be caused by any medication additions or changes?: No (10/2/2024 10:08 AM)  Does the patient have all medications ordered at discharge?: Yes (10/2/2024 10:08 AM)  Care Management Interventions: No intervention needed (10/2/2024 10:08 AM)  Prescription Comments: New scripts gievn to pt at discharge : -- Increase Zonisamide 300 mg--->400 mg nightly  - Change keppra 1000 mg daily-->500 mg BID  - Start Seroquel 25 mg every night, please follow up with PCP to continue this medication. (10/2/2024 10:08 AM)  Is the patient taking all medications as directed (includes completed medication regime)?: Yes (10/2/2024 10:08 AM)  Care Management Interventions: Provided patient education (10/2/2024 10:08 AM)  Medication Comments: spouse denies problems obtaining or affording medication  . (10/2/2024 10:08 AM)    Appointments  Does the patient have a primary care provider?: Yes (10/2/2024 10:08 AM)  Care Management Interventions: Educated patient on importance of making appointment (10/2/2024 10:08 AM)  Has the patient kept scheduled appointments due by today?: Yes (10/2/2024 10:08 AM)    Self Management  What is the home health agency?: [facility] (10/2/2024 10:08 AM)  Has home health visited the patient within 72 hours of discharge?: Yes (10/2/2024 10:08 AM)  What Durable Medical Equipment (DME) was ordered?: denies need (10/2/2024 10:08 AM)    Patient Teaching  Does the patient have access to their  discharge instructions?: Yes (10/2/2024 10:08 AM)  Care Management Interventions: Reviewed instructions with patient (10/2/2024 10:08 AM)  What is the patient's perception of their health status since discharge?: Improving (10/2/2024 10:08 AM)  Is the patient/caregiver able to teach back the hierarchy of who to call/visit for symptoms/problems? PCP, Specialist, Home Health nurse, Urgent Care, ED, 911: Yes (10/2/2024 10:08 AM)  Patient/Caregiver Education Comments: This CM spoke with pts spouse via phone. Spouse reports pt doing well at home since discharge. New meds reviewed. Spouse reports they have picked up all new meds and have no questions at this time. Spouse has spoken with C, task to pcp office for open appt. Spouse aware of my availability for non-emergent concerns. Contact info provided. (10/2/2024 10:08 AM)

## 2024-10-04 ENCOUNTER — HOME CARE VISIT (OUTPATIENT)
Dept: HOME HEALTH SERVICES | Facility: HOME HEALTH | Age: 74
End: 2024-10-04
Payer: MEDICARE

## 2024-10-04 VITALS
SYSTOLIC BLOOD PRESSURE: 116 MMHG | TEMPERATURE: 97.9 F | DIASTOLIC BLOOD PRESSURE: 74 MMHG | OXYGEN SATURATION: 100 % | HEART RATE: 74 BPM | RESPIRATION RATE: 18 BRPM

## 2024-10-04 PROCEDURE — G0157 HHC PT ASSISTANT EA 15: HCPCS | Mod: CQ

## 2024-10-04 ASSESSMENT — ENCOUNTER SYMPTOMS
PAIN SEVERITY GOAL: 0/10
PAIN LOCATION: BACK
PERSON REPORTING PAIN: PATIENT
PAIN LOCATION - PAIN FREQUENCY: FREQUENT
HIGHEST PAIN SEVERITY IN PAST 24 HOURS: 4/10
PAIN LOCATION - PAIN QUALITY: THROBBING
LOWEST PAIN SEVERITY IN PAST 24 HOURS: 1/10
PAIN: 1
SUBJECTIVE PAIN PROGRESSION: GRADUALLY IMPROVING
PAIN LOCATION - EXACERBATING FACTORS: POSTURE
PAIN LOCATION - PAIN SEVERITY: 3/10

## 2024-10-04 NOTE — CASE COMMUNICATION
Spouse initially scheduled OT evaluation for today, however, patient and spouse asked the  PTA to notify this OT to cancel visit as they do not feel OT services are necessary at this time.

## 2024-10-07 ENCOUNTER — HOME CARE VISIT (OUTPATIENT)
Dept: HOME HEALTH SERVICES | Facility: HOME HEALTH | Age: 74
End: 2024-10-07
Payer: MEDICARE

## 2024-10-07 VITALS
RESPIRATION RATE: 18 BRPM | HEART RATE: 78 BPM | OXYGEN SATURATION: 98 % | TEMPERATURE: 98.1 F | DIASTOLIC BLOOD PRESSURE: 74 MMHG | SYSTOLIC BLOOD PRESSURE: 118 MMHG

## 2024-10-07 PROCEDURE — G0157 HHC PT ASSISTANT EA 15: HCPCS | Mod: CQ

## 2024-10-07 ASSESSMENT — ENCOUNTER SYMPTOMS
DENIES PAIN: 1
PERSON REPORTING PAIN: PATIENT

## 2024-10-09 ENCOUNTER — HOME CARE VISIT (OUTPATIENT)
Dept: HOME HEALTH SERVICES | Facility: HOME HEALTH | Age: 74
End: 2024-10-09
Payer: MEDICARE

## 2024-10-09 VITALS
HEART RATE: 78 BPM | RESPIRATION RATE: 18 BRPM | TEMPERATURE: 98.1 F | DIASTOLIC BLOOD PRESSURE: 78 MMHG | OXYGEN SATURATION: 98 % | SYSTOLIC BLOOD PRESSURE: 122 MMHG

## 2024-10-09 PROCEDURE — G0157 HHC PT ASSISTANT EA 15: HCPCS | Mod: CQ

## 2024-10-09 ASSESSMENT — ENCOUNTER SYMPTOMS
DENIES PAIN: 1
PERSON REPORTING PAIN: PATIENT

## 2024-10-14 ENCOUNTER — HOME CARE VISIT (OUTPATIENT)
Dept: HOME HEALTH SERVICES | Facility: HOME HEALTH | Age: 74
End: 2024-10-14
Payer: MEDICARE

## 2024-10-14 VITALS
DIASTOLIC BLOOD PRESSURE: 78 MMHG | HEART RATE: 82 BPM | OXYGEN SATURATION: 98 % | TEMPERATURE: 98.1 F | SYSTOLIC BLOOD PRESSURE: 122 MMHG | RESPIRATION RATE: 18 BRPM

## 2024-10-14 DIAGNOSIS — G40.909 SEIZURE DISORDER (MULTI): ICD-10-CM

## 2024-10-14 DIAGNOSIS — G40.901 STATUS EPILEPTICUS (MULTI): ICD-10-CM

## 2024-10-14 PROCEDURE — G0157 HHC PT ASSISTANT EA 15: HCPCS | Mod: CQ

## 2024-10-14 RX ORDER — QUETIAPINE FUMARATE 25 MG/1
25 TABLET, FILM COATED ORAL NIGHTLY
Qty: 30 TABLET | Refills: 11 | Status: SHIPPED | OUTPATIENT
Start: 2024-10-14 | End: 2024-10-17 | Stop reason: SDUPTHER

## 2024-10-14 ASSESSMENT — ENCOUNTER SYMPTOMS
PAIN LOCATION - PAIN FREQUENCY: INTERMITTENT
HIGHEST PAIN SEVERITY IN PAST 24 HOURS: 4/10
PAIN: 1
PAIN LOCATION - PAIN SEVERITY: 3/10
SUBJECTIVE PAIN PROGRESSION: GRADUALLY IMPROVING
PAIN LOCATION - PAIN QUALITY: THROBBING
LOWEST PAIN SEVERITY IN PAST 24 HOURS: 2/10
PERSON REPORTING PAIN: PATIENT
PAIN LOCATION: LEFT KNEE

## 2024-10-16 ENCOUNTER — PATIENT OUTREACH (OUTPATIENT)
Dept: PRIMARY CARE | Facility: CLINIC | Age: 74
End: 2024-10-16
Payer: MEDICARE

## 2024-10-16 ENCOUNTER — HOME CARE VISIT (OUTPATIENT)
Dept: HOME HEALTH SERVICES | Facility: HOME HEALTH | Age: 74
End: 2024-10-16
Payer: MEDICARE

## 2024-10-16 VITALS
RESPIRATION RATE: 18 BRPM | DIASTOLIC BLOOD PRESSURE: 78 MMHG | HEART RATE: 82 BPM | TEMPERATURE: 98.2 F | SYSTOLIC BLOOD PRESSURE: 124 MMHG | OXYGEN SATURATION: 98 %

## 2024-10-16 PROCEDURE — G0157 HHC PT ASSISTANT EA 15: HCPCS | Mod: CQ

## 2024-10-16 ASSESSMENT — ENCOUNTER SYMPTOMS
DENIES PAIN: 1
PERSON REPORTING PAIN: PATIENT

## 2024-10-16 NOTE — PROGRESS NOTES
Call regarding  hospitalization.  Pcp appt 11/8/24. Cm spoke with spouse.  At time of outreach call the spouse feels the patients condition has (improved since last visit.  Spouse has no questions or concerns at this time.  Spouse encouraged to call wi any needs arise.

## 2024-10-17 DIAGNOSIS — G40.901 STATUS EPILEPTICUS (MULTI): ICD-10-CM

## 2024-10-17 DIAGNOSIS — G40.909 SEIZURE DISORDER (MULTI): ICD-10-CM

## 2024-10-17 RX ORDER — QUETIAPINE FUMARATE 25 MG/1
25 TABLET, FILM COATED ORAL NIGHTLY
Qty: 30 TABLET | Refills: 11 | Status: SHIPPED | OUTPATIENT
Start: 2024-10-17 | End: 2025-10-17

## 2024-10-21 ENCOUNTER — HOME CARE VISIT (OUTPATIENT)
Dept: HOME HEALTH SERVICES | Facility: HOME HEALTH | Age: 74
End: 2024-10-21
Payer: MEDICARE

## 2024-10-21 VITALS
TEMPERATURE: 98.1 F | RESPIRATION RATE: 18 BRPM | OXYGEN SATURATION: 100 % | DIASTOLIC BLOOD PRESSURE: 76 MMHG | SYSTOLIC BLOOD PRESSURE: 120 MMHG | HEART RATE: 78 BPM

## 2024-10-21 PROCEDURE — G0157 HHC PT ASSISTANT EA 15: HCPCS | Mod: CQ

## 2024-10-21 ASSESSMENT — ENCOUNTER SYMPTOMS
PERSON REPORTING PAIN: PATIENT
DENIES PAIN: 1

## 2024-10-23 ENCOUNTER — HOME CARE VISIT (OUTPATIENT)
Dept: HOME HEALTH SERVICES | Facility: HOME HEALTH | Age: 74
End: 2024-10-23
Payer: MEDICARE

## 2024-10-23 VITALS
HEART RATE: 115 BPM | RESPIRATION RATE: 14 BRPM | SYSTOLIC BLOOD PRESSURE: 124 MMHG | TEMPERATURE: 98 F | DIASTOLIC BLOOD PRESSURE: 72 MMHG | OXYGEN SATURATION: 97 %

## 2024-10-23 PROCEDURE — G0151 HHCP-SERV OF PT,EA 15 MIN: HCPCS

## 2024-10-23 SDOH — HEALTH STABILITY: PHYSICAL HEALTH: EXERCISE ACTIVITY: UE KARATE TECHNIQUE

## 2024-10-23 SDOH — HEALTH STABILITY: PHYSICAL HEALTH: EXERCISE ACTIVITIES SETS: 1

## 2024-10-23 SDOH — HEALTH STABILITY: PHYSICAL HEALTH: EXERCISE ACTIVITY: LE KARATE TECHNIQUE

## 2024-10-23 SDOH — HEALTH STABILITY: PHYSICAL HEALTH: EXERCISE TYPE: LE EXERCISES

## 2024-10-23 ASSESSMENT — GAIT ASSESSMENTS
TRUNK SCORE: 2
PATH: 2 - STRAIGHT WITHOUT WALKING AID
STEP SYMMETRY: 1 - RIGHT AND LEFT STEP LENGTH APPEAR EQUAL
STEP CONTINUITY: 1 - STEPS APPEAR CONTINUOUS
GAIT SCORE: 12
BALANCE AND GAIT SCORE: 27
PATH SCORE: 2
TRUNK: 2 - NO SWAY, NO FLEXION, NO USE OF ARMS, NO WALKING AID
WALKING STANCE: 1 - HEELS ALMOST TOUCHING WHILE WALKING
INITIATION OF GAIT IMMEDIATELY AFTER GO: 1 - NO HESITANCY

## 2024-10-23 ASSESSMENT — BALANCE ASSESSMENTS
SITTING DOWN: 2 - SAFE, SMOOTH MOTION
TURNING 360 DEGREES STEPS: 1 - CONTINUOUS STEPS
STANDING BALANCE: 1 - STEADY BUT WIDE STANCE AND USES CANE OR OTHER SUPPORT
ARISES: 2 - ABLE WITHOUT USING ARMS
BALANCE SCORE: 15
IMMEDIATE STANDING BALANCE FIRST 5 SECONDS: 2 - STEADY WITHOUT WALKER OR OTHER SUPPORT
ARISING SCORE: 2
SITTING BALANCE: 1 - STEADY, SAFE
ATTEMPTS TO ARISE: 2 - ABLE TO RISE, ONE ATTEMPT
NUDGED: 2 - STEADY
EYES CLOSED AT MAXIMUM POSITION NUDGED: 1 - STEADY
NUDGED SCORE: 2

## 2024-10-23 ASSESSMENT — ACTIVITIES OF DAILY LIVING (ADL)
HOME_HEALTH_OASIS: 00
OASIS_M1830: 00
AMBULATION ASSISTANCE ON FLAT SURFACES: 1
AMBULATION_DISTANCE/DURATION_TOLERATED: 500 FT

## 2024-10-23 ASSESSMENT — ENCOUNTER SYMPTOMS
DENIES PAIN: 1
PERSON REPORTING PAIN: PATIENT
OCCASIONAL FEELINGS OF UNSTEADINESS: 1

## 2024-10-30 ENCOUNTER — APPOINTMENT (OUTPATIENT)
Dept: CARDIOLOGY | Facility: CLINIC | Age: 74
End: 2024-10-30
Payer: MEDICARE

## 2024-10-30 VITALS
HEIGHT: 67 IN | WEIGHT: 138.6 LBS | BODY MASS INDEX: 21.75 KG/M2 | SYSTOLIC BLOOD PRESSURE: 108 MMHG | HEART RATE: 98 BPM | DIASTOLIC BLOOD PRESSURE: 62 MMHG

## 2024-10-30 DIAGNOSIS — Z78.9 NEVER SMOKED TOBACCO: ICD-10-CM

## 2024-10-30 DIAGNOSIS — I48.11 LONGSTANDING PERSISTENT ATRIAL FIBRILLATION (MULTI): ICD-10-CM

## 2024-10-30 DIAGNOSIS — I10 ESSENTIAL HYPERTENSION: ICD-10-CM

## 2024-10-30 DIAGNOSIS — R00.0 TACHYCARDIA: ICD-10-CM

## 2024-10-30 DIAGNOSIS — I42.8 NON-ISCHEMIC CARDIOMYOPATHY (MULTI): ICD-10-CM

## 2024-10-30 PROCEDURE — 93000 ELECTROCARDIOGRAM COMPLETE: CPT | Performed by: INTERNAL MEDICINE

## 2024-10-30 PROCEDURE — 1111F DSCHRG MED/CURRENT MED MERGE: CPT | Performed by: INTERNAL MEDICINE

## 2024-10-30 PROCEDURE — 3008F BODY MASS INDEX DOCD: CPT | Performed by: INTERNAL MEDICINE

## 2024-10-30 PROCEDURE — 3078F DIAST BP <80 MM HG: CPT | Performed by: INTERNAL MEDICINE

## 2024-10-30 PROCEDURE — 99214 OFFICE O/P EST MOD 30 MIN: CPT | Performed by: INTERNAL MEDICINE

## 2024-10-30 PROCEDURE — 1159F MED LIST DOCD IN RCRD: CPT | Performed by: INTERNAL MEDICINE

## 2024-10-30 PROCEDURE — 1157F ADVNC CARE PLAN IN RCRD: CPT | Performed by: INTERNAL MEDICINE

## 2024-10-30 PROCEDURE — 3074F SYST BP LT 130 MM HG: CPT | Performed by: INTERNAL MEDICINE

## 2024-10-30 ASSESSMENT — ENCOUNTER SYMPTOMS
NEUROLOGICAL NEGATIVE: 1
CONSTITUTIONAL NEGATIVE: 1
IRREGULAR HEARTBEAT: 1
RESPIRATORY NEGATIVE: 1

## 2024-11-07 ENCOUNTER — OFFICE VISIT (OUTPATIENT)
Dept: NEUROLOGY | Facility: HOSPITAL | Age: 74
End: 2024-11-07
Payer: MEDICARE

## 2024-11-07 VITALS
SYSTOLIC BLOOD PRESSURE: 106 MMHG | DIASTOLIC BLOOD PRESSURE: 70 MMHG | BODY MASS INDEX: 19.76 KG/M2 | HEART RATE: 103 BPM | WEIGHT: 138 LBS | RESPIRATION RATE: 18 BRPM | HEIGHT: 70 IN

## 2024-11-07 DIAGNOSIS — G40.901 STATUS EPILEPTICUS (MULTI): ICD-10-CM

## 2024-11-07 DIAGNOSIS — G40.909 SEIZURE DISORDER (MULTI): ICD-10-CM

## 2024-11-07 PROCEDURE — 99417 PROLNG OP E/M EACH 15 MIN: CPT | Performed by: NURSE PRACTITIONER

## 2024-11-07 PROCEDURE — 1036F TOBACCO NON-USER: CPT | Performed by: NURSE PRACTITIONER

## 2024-11-07 PROCEDURE — 3074F SYST BP LT 130 MM HG: CPT | Performed by: NURSE PRACTITIONER

## 2024-11-07 PROCEDURE — 3008F BODY MASS INDEX DOCD: CPT | Performed by: NURSE PRACTITIONER

## 2024-11-07 PROCEDURE — 1157F ADVNC CARE PLAN IN RCRD: CPT | Performed by: NURSE PRACTITIONER

## 2024-11-07 PROCEDURE — 1159F MED LIST DOCD IN RCRD: CPT | Performed by: NURSE PRACTITIONER

## 2024-11-07 PROCEDURE — 99215 OFFICE O/P EST HI 40 MIN: CPT | Performed by: NURSE PRACTITIONER

## 2024-11-07 PROCEDURE — 3078F DIAST BP <80 MM HG: CPT | Performed by: NURSE PRACTITIONER

## 2024-11-07 RX ORDER — LEVETIRACETAM 500 MG/1
500 TABLET ORAL 2 TIMES DAILY
Qty: 180 TABLET | Refills: 3 | Status: SHIPPED | OUTPATIENT
Start: 2024-11-07 | End: 2025-11-07

## 2024-11-07 RX ORDER — QUETIAPINE FUMARATE 25 MG/1
25 TABLET, FILM COATED ORAL NIGHTLY
Qty: 90 TABLET | Refills: 3 | Status: SHIPPED | OUTPATIENT
Start: 2024-11-07 | End: 2025-11-07

## 2024-11-07 RX ORDER — ZONISAMIDE 100 MG/1
400 CAPSULE ORAL DAILY
Qty: 360 CAPSULE | Refills: 3 | Status: SHIPPED | OUTPATIENT
Start: 2024-11-07 | End: 2025-11-07

## 2024-11-07 NOTE — PATIENT INSTRUCTIONS
"Thank you for coming to the [facility] today.  -If you have any sudden new, concerning or worsening symptoms, call 911 and go to the Emergency Room. Otherwise, it was good seeing you today-    -Please follow seizure precautions:   Please do not drive, operate any heavy machinery, swim unsupervised, please shower without collection of water instead of bathe. Be cautious around hot, heavy, or sharp objects. Do not cook with an open flame and do not perform any activities at heights such as on a ladder. These precautions should stay in place until 6 months seizure free and cleared by a provider.    -HOW TO CONTACT HERNAN BOYD EPILEPSY NURSE PRACTITIONER ([phone]).   Instructed to call in the event of seizure, medication refills, or any questions  *Please allow 24-48 hours for non-urgent responses*.  For emergency concerns, please dial 911 or present to the nearest emergency room.  For concerns after business hours (8am-4:30pm) or on weekends please call [phone]  To call and schedule a follow up appointment please call [phone]  -Paperwork may take up to 3 business days to complete-    Every attempt is made to run on time for your appointment, if you are 15 minutes or later for your appoinement you may be asked to reschedule    -Compliance education: It is important to continue to try and achieve seizure control because of the potential for injury and illness due to seizures. In a very small minority of patients with generalized tonic clonic seizures (\"grand mal\"), breathing or heart function can stop during a seizure and result in demise (sudden unexpected death in epilepsy or SUDEP). [place] from seizures prevents this kind of outcome-     I have ordered blood work for you to have completed at the lab. You do not need to bring any paperwork with you if you are going to a [facility]. The results will automatically come to me and I will call or message you with results.      Please " continue taking levetiracetam (Keppra) 500mg twice a day.  At higher doses, some people experience drowsiness or irritability. Not something that I'd expect on your dose, but let me know if this is something you experience.     Continue taking (Zonegran) 400mg every night. In addition to being a good headache medication which we can increase as needed, it is also a good seizure medication. Make sure to drink plenty of water, as higher doses of this medication can cause kidney stones, as well as other side effects such as drowsiness, hallucination and imbalance. It can also cause a decrease in appetite, so keep an eye on any changes in your weight.

## 2024-11-07 NOTE — PROGRESS NOTES
"[facility]   Epilepsy    Patient ID: Paddy Solis 74 y.o.male presenting in follow-up for previously diagnosed epilepsy  Patient of: previously Dr. Briones    [facility]    EPILEPTIC Paroxysmal Episodes   Semiology:   1. Automotor > R Version > GTC w/ pos ictal psychosis** (left temporal onset)  2. Automotor seizure (D) (right temporal onset)  - Onset: April of 2021, possibly 1 year earlier (episodes of confusion)  - Frequency: **None since 4/23   - Last seizure: 4/8/23  EZ: Left temporal   Etiology: Unknown   Comorbidities: HFrEF, NICM, A-Fib on Eliquis      Prior AEDs: Keppra (mood), OXC (hyponatremia)  Current AEDs (last level): ZNS 400mg nightly -500    EEG (3/2023): EMU with Sz from right and left temporal lobes  MRI w/wo (3/2023): As above  Handedness: RH    admitted to hospital 5/2023 with status epilepticus, with multiple reported GTCs without return to baseline. Pt and wife reported that he was compliant on his ZNS. Depakote 500mg DR BID was added. However he reported cognitive side effects on follow up and depakote was switched to keppra XR 1g at bedtime despite episodes of irritability with past use of keppra. Last follow up in 11/2023     Hospital Course 9/24:   \"Sent in from home by EMS with report of witnessed seizure. According to EMS the patient had a seizure at home. EMS was called on scene the patient had 2 more seizures. He was given 5 mg of Versed IV push and route. He remains postictal upon arrival. On arrival to [facility] he was intubated. Pt remained sedated and unresponsive post intubation without clinical seizure-like activity. Pt transferred to [facility] for EEG monitoring to rule out subclinical status epilepticus.   pt noted to have subtle generalized twitching and R gaze deviation c/f seizures, so was bolused prop and increased to 50 mcg/kg/min with subsequent resolution.   EEG was unremarkable and discontinued on 09/24 with no concerns of continued seizures.   Changes in " Anti-seizure Medications:  - Increase Zonisamide 300 mg--->400 mg nightly  - Change keppra 1000 mg daily-->500 mg BID    RESULTS:  CT head wo IV contrast    Result Date: 9/21/2024  No acute intracranial abnormality.     MACRO: None.   Signed by: Livan Doan 9/21/2024 1:43 AM Dictation workstation:   [id number]      No CT head results found for the past 14 days             EEG    Result Date: 9/25/2024  IMPRESSION Impression This is a normal awake and asleep cvEEG. No epileptiform discharges or lateralizing signs seen. A full report will be scanned into the patient's chart at a later time. This report has been interpreted and electronically signed by                 EEG:  EEG    Result Date: 9/25/2024  IMPRESSION Impression This is a normal awake and asleep cvEEG. No epileptiform discharges or lateralizing signs seen. A full report will be scanned into the patient's chart at a later time. This report has been interpreted and electronically signed by     PRESENT CONCERNS:  Paddy presents with his wife today for follow up after hospitalization for SE. Paddy had went on a drinking binge and had not been getting adequate sleep when the seizures occurred. So far since discharge no further seizures. He has been getting adequate sleep, refraining from alcohol, and has been doing isometric exercises. Complaint with medication, he is tolerating -500 better than the 1g XR. Agitation has improved with Seroquel 25mg at night.       Review of Systems  All other systems reviewed and negative unless otherwise stated above    CONTROLLED SUBSTANCE  N/A    Vitals:  Vitals:    11/07/24 0910   BP: 106/70   Pulse: 103   Resp: 18       PHYSICAL EXAM:  The patient was alert and oriented to person, time and place. Language, concentration and memory were intact. Affect was normal.   Eye movements were intact. Muscles of mastication and facial expression moved normally. Hearing was normal bilaterally. There was no  dysarthria.  Coordination in the arms and legs was intact  Involuntary movements: none.  Standard gait was normal        ASSESSMENT & PLAN:   74 y.o. male presenting in follow-up for previously diagnosed epilepsy    Problem List Items Addressed This Visit       Seizure disorder (Multi)    Relevant Medications    zonisamide (Zonegran) 100 mg capsule    QUEtiapine (SEROquel) 25 mg tablet    levETIRAcetam (Keppra) 500 mg tablet    Other Relevant Orders    Levetiracetam    Zonisamide    Status epilepticus (Multi) (Chronic)    Relevant Medications    QUEtiapine (SEROquel) 25 mg tablet    levETIRAcetam (Keppra) 500 mg tablet     Paddy Solis is a 73 y.o. 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     Otherwise healthy individual there would be a consideration of surgical resection of his L sided lesion however, given the patient's age and low cardiac function (non-ischemic cardiomyopathy w/ EF of 35%) and surgical management of his seizures would be a scenario where risk outweighs benefit.        Continue ZNS 400mg nightly and -500   Continue seroquel 25mg nightly   RTC 3-4 months will met Dr. Zoe De La Rosa   please follow seizure precautions which include no driving until 6 months seizure free and cleared by a provider March 2025  Call me with any seizures prior to your next appointment   Given my contact information/instructions for Tony

## 2024-11-08 ENCOUNTER — APPOINTMENT (OUTPATIENT)
Dept: PRIMARY CARE | Facility: CLINIC | Age: 74
End: 2024-11-08
Payer: MEDICARE

## 2024-11-08 VITALS
DIASTOLIC BLOOD PRESSURE: 80 MMHG | BODY MASS INDEX: 22.51 KG/M2 | OXYGEN SATURATION: 97 % | WEIGHT: 143.4 LBS | TEMPERATURE: 98.2 F | SYSTOLIC BLOOD PRESSURE: 106 MMHG | HEIGHT: 67 IN | HEART RATE: 85 BPM

## 2024-11-08 DIAGNOSIS — Z09 HOSPITAL DISCHARGE FOLLOW-UP: ICD-10-CM

## 2024-11-08 DIAGNOSIS — R19.5 POSITIVE COLORECTAL CANCER SCREENING USING COLOGUARD TEST: ICD-10-CM

## 2024-11-08 DIAGNOSIS — I48.11 LONGSTANDING PERSISTENT ATRIAL FIBRILLATION (MULTI): ICD-10-CM

## 2024-11-08 DIAGNOSIS — I10 ESSENTIAL HYPERTENSION: ICD-10-CM

## 2024-11-08 DIAGNOSIS — G40.909 SEIZURE DISORDER (MULTI): ICD-10-CM

## 2024-11-08 DIAGNOSIS — Z00.00 ENCOUNTER FOR ANNUAL WELLNESS VISIT (AWV) IN MEDICARE PATIENT: ICD-10-CM

## 2024-11-08 DIAGNOSIS — Z00.00 ROUTINE GENERAL MEDICAL EXAMINATION AT HEALTH CARE FACILITY: Primary | ICD-10-CM

## 2024-11-08 PROCEDURE — 1157F ADVNC CARE PLAN IN RCRD: CPT | Performed by: FAMILY MEDICINE

## 2024-11-08 PROCEDURE — 99214 OFFICE O/P EST MOD 30 MIN: CPT | Performed by: FAMILY MEDICINE

## 2024-11-08 PROCEDURE — 90662 IIV NO PRSV INCREASED AG IM: CPT | Performed by: FAMILY MEDICINE

## 2024-11-08 PROCEDURE — 99397 PER PM REEVAL EST PAT 65+ YR: CPT | Performed by: FAMILY MEDICINE

## 2024-11-08 PROCEDURE — 3079F DIAST BP 80-89 MM HG: CPT | Performed by: FAMILY MEDICINE

## 2024-11-08 PROCEDURE — 3074F SYST BP LT 130 MM HG: CPT | Performed by: FAMILY MEDICINE

## 2024-11-08 PROCEDURE — G0439 PPPS, SUBSEQ VISIT: HCPCS | Performed by: FAMILY MEDICINE

## 2024-11-08 PROCEDURE — 99497 ADVNCD CARE PLAN 30 MIN: CPT | Performed by: FAMILY MEDICINE

## 2024-11-08 PROCEDURE — G0444 DEPRESSION SCREEN ANNUAL: HCPCS | Performed by: FAMILY MEDICINE

## 2024-11-08 PROCEDURE — G0008 ADMIN INFLUENZA VIRUS VAC: HCPCS | Performed by: FAMILY MEDICINE

## 2024-11-08 PROCEDURE — 1170F FXNL STATUS ASSESSED: CPT | Performed by: FAMILY MEDICINE

## 2024-11-08 PROCEDURE — 3008F BODY MASS INDEX DOCD: CPT | Performed by: FAMILY MEDICINE

## 2024-11-08 ASSESSMENT — PATIENT HEALTH QUESTIONNAIRE - PHQ9
10. IF YOU CHECKED OFF ANY PROBLEMS, HOW DIFFICULT HAVE THESE PROBLEMS MADE IT FOR YOU TO DO YOUR WORK, TAKE CARE OF THINGS AT HOME, OR GET ALONG WITH OTHER PEOPLE: SOMEWHAT DIFFICULT
1. LITTLE INTEREST OR PLEASURE IN DOING THINGS: NOT AT ALL
SUM OF ALL RESPONSES TO PHQ9 QUESTIONS 1 AND 2: 1
2. FEELING DOWN, DEPRESSED OR HOPELESS: SEVERAL DAYS
10. IF YOU CHECKED OFF ANY PROBLEMS, HOW DIFFICULT HAVE THESE PROBLEMS MADE IT FOR YOU TO DO YOUR WORK, TAKE CARE OF THINGS AT HOME, OR GET ALONG WITH OTHER PEOPLE: SOMEWHAT DIFFICULT
1. LITTLE INTEREST OR PLEASURE IN DOING THINGS: NOT AT ALL
SUM OF ALL RESPONSES TO PHQ9 QUESTIONS 1 AND 2: 1
2. FEELING DOWN, DEPRESSED OR HOPELESS: SEVERAL DAYS

## 2024-11-08 ASSESSMENT — ACTIVITIES OF DAILY LIVING (ADL)
TAKING_MEDICATION: INDEPENDENT
DOING_HOUSEWORK: INDEPENDENT
BATHING: INDEPENDENT
DRESSING: INDEPENDENT
MANAGING_FINANCES: INDEPENDENT
GROCERY_SHOPPING: INDEPENDENT

## 2024-11-08 ASSESSMENT — ENCOUNTER SYMPTOMS
LOSS OF SENSATION IN FEET: 0
OCCASIONAL FEELINGS OF UNSTEADINESS: 0
DEPRESSION: 0

## 2024-11-08 NOTE — PROGRESS NOTES
.mssSubjective   Reason for Visit: Paddy Solis is an 74 y.o. male here for a Medicare Wellness visit.      Cologuard positive  Colonoscopy ordered  Psa ordered  Flu shot today  To pharm for covid and shingles    Depression Screening  Depression screening completed using the PHQ-2 questions, with results documented in the chart/encounter (~5min).  (See Rooming Screening section for documentation, and/or progress note for additional information)    Cardiac Risk Assessment  The ASCVD Risk score (Ivette SERRANO, et al., 2019) failed to calculate for the following reasons:    Risk score cannot be calculated because patient has a medical history suggesting prior/existing ASCVD     Cardiovascular risk was discussed and, if needed, lifestyle modifications recommended, including nutritional choices, exercise, and elimination of habits contributing to risk. We agreed on a plan to reduce the current cardiovascular risk based on above discussion as needed.     Aspirin use/disuse was discussed and documented in the Problem List of the medical record (under Cardiac Risk Counseling) after reviewing the updated guidelines below:  Consider low dose Aspirin ( mg) use if the benefit for cardiovascular disease prevention outweighs risk for bleeding complications.   Discussed that in general, low dose ASA should be considered:  In patients WITHOUT prior MI/stroke/PAD (primary prevention):   a. Age <60: Use if 10-year cardiovascular disease risk >20%, with discussion of risks and benefits with patient  b. Age 60-<70: Use if 10-year cardiovascular disease risk >20% and low bleeding (e.g., gastrointestinal) risk, with discussion of risks and benefits with patient  c. Age >=70: Do not use    In patients WITH prior MI/stroke/PAD (secondary prevention):   Generally use unless extremely high bleeding (e.g., gastrointenstinal) risk, with discussion of risks and benefits with patient    Advance Directives Discussion  Advanced Care  Planning (including a Living Will, Healthcare POA, as well as specific end of life choices and/or directives), was discussed with the patient and/or surrogate, voluntarily, and details of that discussion documented in the Problem List (under Advanced Directives Discussion) of the medical record.    (~16 min spent discussing above)     During the course of the visit the patient was educated and counseled about age appropriate screening and preventive services.   Completed preventive screenings were documented in the chart (see Routine Health Maintenance in Problem List) and orders were placed for outstanding screenings/procedures as documented in the Assessment and Plan.    Patient Instructions (the written plan) was given to the patient at check out.        HPI    Patient Care Team:  Keith Grady MD as PCP - General  Keith Grady MD as PCP - [employer] PCP  MD Garry Peters MD as Cardiologist (Cardiology)  Isamar Payton RN as Care Manager (Case Management)  Rosalba Stanley MD as Referring Physician (Neurology)  Keith Grady MD as Primary Care Provider (Family Medicine)     Review of Systems    General Medical follow-up.  History of atrial fibrillation.  Asymptomatic.  No palpitations.  History of seizure disorder.  Recent hospitalization.  Hospital records reviewed.  [place].  Discharged home.  He has been doing better.  No further seizures.  Medications reviewed.  He is staying physically active.  Doing stretches.  Isometric strengthening exercises.  No chest pain.  Active problems are noted.  Chronic disease management as are reviewed.  Laboratory assessment is ordered.  He has a positive Cologuard.  Colonoscopy ordered.  Prostate cancer screening ordered.  Medications reconciled.  Flu shot given today.    Objective   Vitals:  /80 (BP Location: Left arm, Patient Position: Sitting, BP Cuff Size: Adult)   Pulse 85    "Temp 36.8 °C (98.2 °F) (Temporal)   Ht 1.702 m (5' 7\")   Wt 65 kg (143 lb 6.4 oz)   SpO2 97% Comment: RA  BMI 22.46 kg/m²       Physical Exam  Vitals and nursing note reviewed.   Constitutional:       General: He is not in acute distress.     Appearance: He is not ill-appearing or toxic-appearing.   HENT:      Head: Normocephalic and atraumatic.      Mouth/Throat:      Pharynx: No oropharyngeal exudate or posterior oropharyngeal erythema.   Eyes:      Extraocular Movements: Extraocular movements intact.      Conjunctiva/sclera: Conjunctivae normal.      Pupils: Pupils are equal, round, and reactive to light.   Cardiovascular:      Rate and Rhythm: Normal rate and regular rhythm.      Pulses: Normal pulses.      Heart sounds: Normal heart sounds. No murmur heard.  Pulmonary:      Effort: Pulmonary effort is normal.      Breath sounds: Normal breath sounds. No wheezing, rhonchi or rales.   Abdominal:      General: Abdomen is flat. Bowel sounds are normal. There is no distension.      Palpations: Abdomen is soft. There is no mass.      Tenderness: There is no abdominal tenderness.      Hernia: No hernia is present.   Musculoskeletal:         General: No swelling or deformity. Normal range of motion.      Cervical back: Normal range of motion and neck supple.   Skin:     General: Skin is warm and dry.      Capillary Refill: Capillary refill takes less than 2 seconds.      Coloration: Skin is not jaundiced.      Findings: No erythema or rash.   Neurological:      General: No focal deficit present.      Mental Status: He is oriented to person, place, and time. Mental status is at baseline.   Psychiatric:         Mood and Affect: Mood normal.         Behavior: Behavior normal.         Thought Content: Thought content normal.         Judgment: Judgment normal.         Assessment & Plan  Routine general medical examination at health care facility    Orders:    1 Year Follow Up In Primary Care - Wellness Exam; Future    1 " Year Follow Up In Primary Care - Wellness Exam; Future    Positive colorectal cancer screening using Cologuard test    Orders:    Colonoscopy Screening; Average Risk Patient; Future    Encounter for annual wellness visit (AWV) in Medicare patient         Longstanding persistent atrial fibrillation (Multi)    Orders:    Comprehensive metabolic panel; Future    Lipid panel; Future    Seizure disorder (Multi)         Essential hypertension    Orders:    Comprehensive metabolic panel; Future    Lipid panel; Future    Hospital discharge follow-up

## 2024-11-14 ENCOUNTER — PATIENT OUTREACH (OUTPATIENT)
Dept: PRIMARY CARE | Facility: CLINIC | Age: 74
End: 2024-11-14
Payer: MEDICARE

## 2024-11-15 ENCOUNTER — LAB (OUTPATIENT)
Dept: LAB | Facility: LAB | Age: 74
End: 2024-11-15
Payer: MEDICARE

## 2024-11-15 DIAGNOSIS — I10 ESSENTIAL HYPERTENSION: ICD-10-CM

## 2024-11-15 DIAGNOSIS — I48.11 LONGSTANDING PERSISTENT ATRIAL FIBRILLATION (MULTI): ICD-10-CM

## 2024-11-15 DIAGNOSIS — G40.909 SEIZURE DISORDER (MULTI): ICD-10-CM

## 2024-11-15 LAB
ALBUMIN SERPL BCP-MCNC: 4 G/DL (ref 3.4–5)
ALP SERPL-CCNC: 111 U/L (ref 33–136)
ALT SERPL W P-5'-P-CCNC: 28 U/L (ref 10–52)
ANION GAP SERPL CALC-SCNC: 10 MMOL/L (ref 10–20)
AST SERPL W P-5'-P-CCNC: 26 U/L (ref 9–39)
BILIRUB SERPL-MCNC: 0.5 MG/DL (ref 0–1.2)
BUN SERPL-MCNC: 19 MG/DL (ref 6–23)
CALCIUM SERPL-MCNC: 9.4 MG/DL (ref 8.6–10.3)
CHLORIDE SERPL-SCNC: 106 MMOL/L (ref 98–107)
CHOLEST SERPL-MCNC: 210 MG/DL (ref 0–199)
CHOLESTEROL/HDL RATIO: 5.4
CO2 SERPL-SCNC: 29 MMOL/L (ref 21–32)
CREAT SERPL-MCNC: 1.33 MG/DL (ref 0.5–1.3)
EGFRCR SERPLBLD CKD-EPI 2021: 57 ML/MIN/1.73M*2
GLUCOSE SERPL-MCNC: 82 MG/DL (ref 74–99)
HDLC SERPL-MCNC: 39.1 MG/DL
LDLC SERPL CALC-MCNC: 114 MG/DL
LEVETIRACETAM SERPL-MCNC: 21 UG/ML (ref 10–40)
NON HDL CHOLESTEROL: 171 MG/DL (ref 0–149)
POTASSIUM SERPL-SCNC: 4.3 MMOL/L (ref 3.5–5.3)
PROT SERPL-MCNC: 6.5 G/DL (ref 6.4–8.2)
SODIUM SERPL-SCNC: 141 MMOL/L (ref 136–145)
TRIGL SERPL-MCNC: 285 MG/DL (ref 0–149)
VLDL: 57 MG/DL (ref 0–40)

## 2024-11-15 PROCEDURE — 80203 DRUG SCREEN QUANT ZONISAMIDE: CPT

## 2024-11-15 PROCEDURE — 36415 COLL VENOUS BLD VENIPUNCTURE: CPT

## 2024-11-15 PROCEDURE — 80177 DRUG SCRN QUAN LEVETIRACETAM: CPT

## 2024-11-15 PROCEDURE — 80061 LIPID PANEL: CPT

## 2024-11-15 PROCEDURE — 80053 COMPREHEN METABOLIC PANEL: CPT

## 2024-11-18 LAB — ZONISAMIDE SERPL-MCNC: 17 UG/ML (ref 10–40)

## 2024-11-22 DIAGNOSIS — I48.0 PAROXYSMAL ATRIAL FIBRILLATION (MULTI): ICD-10-CM

## 2024-11-25 NOTE — TELEPHONE ENCOUNTER
Received request for prescription refills for patient.   Patient follows with Dr. Short    Request is for Eliquis  Is patient currently on medication yes, pt taking 2.5 mg daily and per [facility] dictation he is aware and ok that pt is taking 2.5 mg BID    Last OV 10/30/2024  Next OV 4/30/2025    Pended for signing and sent to provider

## 2024-12-10 ENCOUNTER — APPOINTMENT (OUTPATIENT)
Dept: CARDIOLOGY | Facility: CLINIC | Age: 74
End: 2024-12-10
Payer: MEDICARE

## 2024-12-12 ENCOUNTER — PATIENT OUTREACH (OUTPATIENT)
Dept: PRIMARY CARE | Facility: CLINIC | Age: 74
End: 2024-12-12
Payer: MEDICARE

## 2024-12-18 ENCOUNTER — APPOINTMENT (OUTPATIENT)
Dept: CARDIOLOGY | Facility: CLINIC | Age: 74
End: 2024-12-18
Payer: MEDICARE

## 2024-12-24 ENCOUNTER — OFFICE VISIT (OUTPATIENT)
Dept: CARDIOLOGY | Facility: CLINIC | Age: 74
End: 2024-12-24
Payer: MEDICARE

## 2024-12-24 VITALS
HEART RATE: 76 BPM | BODY MASS INDEX: 20.47 KG/M2 | HEIGHT: 70 IN | OXYGEN SATURATION: 99 % | WEIGHT: 143 LBS | DIASTOLIC BLOOD PRESSURE: 95 MMHG | TEMPERATURE: 97.3 F | SYSTOLIC BLOOD PRESSURE: 126 MMHG

## 2024-12-24 DIAGNOSIS — I48.11 LONGSTANDING PERSISTENT ATRIAL FIBRILLATION (MULTI): Primary | ICD-10-CM

## 2024-12-24 PROCEDURE — 1157F ADVNC CARE PLAN IN RCRD: CPT | Performed by: INTERNAL MEDICINE

## 2024-12-24 PROCEDURE — 93010 ELECTROCARDIOGRAM REPORT: CPT | Performed by: INTERNAL MEDICINE

## 2024-12-24 PROCEDURE — 3080F DIAST BP >= 90 MM HG: CPT | Performed by: INTERNAL MEDICINE

## 2024-12-24 PROCEDURE — G2211 COMPLEX E/M VISIT ADD ON: HCPCS | Performed by: INTERNAL MEDICINE

## 2024-12-24 PROCEDURE — 99214 OFFICE O/P EST MOD 30 MIN: CPT | Mod: 25 | Performed by: INTERNAL MEDICINE

## 2024-12-24 PROCEDURE — 1036F TOBACCO NON-USER: CPT | Performed by: INTERNAL MEDICINE

## 2024-12-24 PROCEDURE — 99204 OFFICE O/P NEW MOD 45 MIN: CPT | Performed by: INTERNAL MEDICINE

## 2024-12-24 PROCEDURE — 3008F BODY MASS INDEX DOCD: CPT | Performed by: INTERNAL MEDICINE

## 2024-12-24 PROCEDURE — 1159F MED LIST DOCD IN RCRD: CPT | Performed by: INTERNAL MEDICINE

## 2024-12-24 PROCEDURE — 93005 ELECTROCARDIOGRAM TRACING: CPT | Performed by: INTERNAL MEDICINE

## 2024-12-24 PROCEDURE — 3074F SYST BP LT 130 MM HG: CPT | Performed by: INTERNAL MEDICINE

## 2024-12-24 ASSESSMENT — PATIENT HEALTH QUESTIONNAIRE - PHQ9
SUM OF ALL RESPONSES TO PHQ9 QUESTIONS 1 AND 2: 0
1. LITTLE INTEREST OR PLEASURE IN DOING THINGS: NOT AT ALL
2. FEELING DOWN, DEPRESSED OR HOPELESS: NOT AT ALL

## 2024-12-24 NOTE — Clinical Note
Hi Dr. Short,  I saw Mr. Solis today.  I do not think that he would benefit much from rhythm control given the longstanding nature of his atrial fibrillation and the severely dilated left atrium.  I think the only way we would have a chance of maintaining normal sinus rhythm would be to do a hybrid convergent ablation with Dr. Hampton followed by an endocardial ablation with me.  But given that his left atrium is so remodeled and he is completely asymptomatic, I think the benefit of this approach is questionable.  He also seems to have significant neurological/memory issues, may be from his longstanding alcohol use.  Ultimately I do not think he would benefit much from rhythm control.  Happy to reconsider if you have any objections.  Rodney, Iris

## 2024-12-24 NOTE — PROGRESS NOTES
Referring Provider: Iris Liao MD  Reason for Consult: Atrial fibrillation    History of Present Illness:      Paddy Solis is a 74 y.o. year old male patient with a history significant for longstanding persistent atrial fibrillation, nonischemic cardiomyopathy likely due to alcohol use s/p recovery, alcohol use disorder in remission, mitral valve regurgitation, hypertension, and seizure disorder who is referred by Dr. Short for atrial fibrillation management.    He was formally diagnosed with atrial fibrillation in 2021, but states that he has had atrial fibrillation for many years prior to this.  From 20 21-20 22, he had multiple attempts at rhythm control by Dr. Lofton with multiple cardioversions and sotalol loading.  However, he was never able to maintain normal sinus rhythm for very long.  Per the patient, the longest that he maintain normal sinus rhythm was for a few hours.  Titration of sotalol was difficult due to his concomitant QT prolonging medications for his seizure disorder.  By 2022, with failure of most rhythm control attempts, he was transition to a rate control strategy.    He feels well today.  He states that he stopped drinking alcohol in September.  Previous to this, he was drinking a significant amount of whiskey daily.  Is unclear if his seizure disorder is related to his alcohol use.  He also states that he is unsure if he has any symptoms in atrial fibrillation as he has been in it for so long.  He denies any shortness of breath with exertion.  He exercises daily.    Focused Cardiovascular Problem List:  Longstanding persistent atrial fibrillation  Nonischemic cardiomyopathy likely due to alcohol use s/p recovery  Alcohol use disorder in remission  Mitral valve regurgitation due to severely dilated left atrium  Hypertension  Seizure disorder      Past Medical and Surgical History:  Mr. Solis  has a past medical history of Encounter for follow-up examination after completed  treatment for conditions other than malignant neoplasm, Encounter for follow-up examination after completed treatment for conditions other than malignant neoplasm, Encounter for screening for diabetes mellitus, Impingement syndrome of unspecified shoulder, Pain in left finger(s) (05/20/2021), Personal history of other diseases of the nervous system and sense organs (05/20/2021), Personal history of other diseases of the nervous system and sense organs, Personal history of other diseases of the respiratory system (05/20/2021), Personal history of other diseases of the respiratory system, and Personal history of other specified conditions (06/01/2021).    has a past surgical history that includes Other surgical history (05/20/2021); Other surgical history (05/20/2021); Other surgical history (05/20/2021); Other surgical history (05/20/2021); Other surgical history (10/02/2021); MR angio head wo IV contrast (4/6/2021); and MR angio neck wo IV contrast (4/6/2021).    Social History:  Social History     Tobacco Use    Smoking status: Never     Passive exposure: Never    Smokeless tobacco: Never   Substance Use Topics    Alcohol use: Not Currently      Tobacco: Denies  Alcohol: Denies  Drug use:  Denies      Relevant Family History:   Family History   Problem Relation Name Age of Onset    No Known Problems Mother      No Known Problems Father       Family History of Sudden Death: No  Family History of Arrhythmia: No     Allergies:  Allergies   Allergen Reactions    Naproxen Hives        Medications:  Current Outpatient Medications   Medication Instructions    apixaban (ELIQUIS) 2.5 mg, oral, 2 times daily    carvedilol (COREG) 25 mg, oral, 2 times daily    levETIRAcetam (KEPPRA) 500 mg, oral, 2 times daily    magnesium oxide (Mag-Ox) 400 mg (241.3 mg magnesium) tablet 1 tablet, Daily    QUEtiapine (SEROQUEL) 25 mg, oral, Nightly    zonisamide (ZONEGRAN) 400 mg, oral, Daily         Objective   Physical Exam:  Last  "Recorded Vitals:      10/16/2024    10:40 AM 10/21/2024     9:12 AM 10/23/2024    10:59 AM 10/30/2024     7:59 AM 11/7/2024     9:10 AM 11/8/2024     4:27 PM 12/24/2024    10:33 AM   Vitals   Systolic 124 120 124 108 106 106 126   Diastolic 78 76 72 62 70 80 95   BP Location Left arm Left arm Left arm Left arm  Left arm    Heart Rate 82 78 115 98 103 85 76   Temp 36.8 °C (98.2 °F) 36.7 °C (98.1 °F) 36.7 °C (98 °F)   36.8 °C (98.2 °F) 36.3 °C (97.3 °F)   Resp 18 18 14  18     Height    1.702 m (5' 7\") 1.778 m (5' 10\") 1.702 m (5' 7\") 1.778 m (5' 10\")   Weight (lb)    138.6 138 143.4 143   BMI    21.71 kg/m2 19.8 kg/m2 22.46 kg/m2 20.52 kg/m2   BSA (m2)    1.72 m2 1.76 m2 1.75 m2 1.79 m2   Visit Report    Report Report Report Report    Visit Vitals  BP (!) 126/95   Pulse 76   Temp 36.3 °C (97.3 °F)   Ht 1.778 m (5' 10\")   Wt 64.9 kg (143 lb)   SpO2 99%   BMI 20.52 kg/m²   Smoking Status Never   BSA 1.79 m²      Gen: NAD, sitting comfortably  HEENT: NC/AT  Card: RRR, no m/r/g  Pulm: Clear to auscultation bilaterally  Ext: No LE edema  Neuro: No focal deficits    Diagnostic Results      My Interpretation of Reviewed Study(s):  Prior ECGs (reviewed and my interpretation):   12/24/2024: Atrial fibrillation, left axis deviation, heart rate 65 bpm      Echocardiography:  9/23/2024: LVEF 50 to 55%, severely dilated left atrium with a left atrial volume index of 55.  Mild to moderate mitral valve regurgitation.         Relevant Labs:  Lab Results   Component Value Date    CREATININE 1.33 (H) 11/15/2024    CREATININE 0.89 09/26/2024    CREATININE 0.98 09/25/2024    K 4.3 11/15/2024    K 3.4 (L) 09/26/2024    K 3.9 09/25/2024    HGB 15.4 09/26/2024    HGB 14.2 09/25/2024    HGB 12.6 (L) 09/24/2024    INR 1.4 (H) 09/25/2024    INR 1.3 (H) 09/24/2024    INR 1.7 (H) 09/23/2024    AST 26 11/15/2024    AST 18 09/20/2024    AST 19 06/06/2023    ALT 28 11/15/2024    ALT 12 09/20/2024    ALT 23 06/06/2023       Assessment/Plan "   Assessment and Plan:  In summary, patient is a pleasant 74 y.o.male with a history of recurrent asymptomatic drug-refractory longstanding persistent atrial fibrillation atrial fibrillation diagnosed in 2021 the likely preceding that, has been maintained on sotalol in the past, now off all antiarrhythmics and only on carvedilol and apixaban, who presents today for initial consultation.  He has no symptoms on atrial fibrillation. Treatment options of atrial fibrillation were discussed with the patient and all questions were answered. These options included: 1. Rate control drug therapy with AVN blockers to slow down the heart rate, 2. Rhythm control with anti-arrhythmic drugs and with cardioversion to restore normal sinus rhythm, and/or 3. Radiofrequency (RF) ablation. In particular, RF ablation of atrial fibrillation was discussed in detail.    We discussed that catheter ablation for atrial fibrillation would have a low likelihood of success, possibly less than 50% due to the longstanding nature of his atrial fibrillation as well as the severe dilation of his left atrium.  Ideally, the best way to achieve rhythm control would be through a hybrid ablation, which would be done by Dr. Henning first and then followed up with a touchup endocardial ablation by me.  With this combined approach, success could be around 70 to 75%.  However, the patient is not very symptomatic while in atrial fibrillation, and his atria are ready significantly remodeled, so the benefit of rhythm control is questionable.  The patient is not interested in a surgical approach.    Given all of this, I think the best option would be to continue with rate control.    Finally, the risk of stroke associated with atrial fibrillation was discussed - as the patient has a elevated [id number] score, the patient will be maintained on apixaban for CVA prevention.    # Longstanding persistent atrial fibrillation  -Patient not interested in hybrid  epicardial atrial fibrillation ablation followed by endocardial catheter ablation.  The clinical benefit of long-term rhythm control is questionable.  - Continue current medications including apixaban 2.5 mg twice daily and carvedilol 25 mg twice daily         Return to Clinic: Patient does not need to return to EP Clinic unless new issues arise    Thank you very much for allowing me to participate in the care of this patient. Please do not hesitate to contact me with any further questions or concerns.    Iris Liao MD  Clinical Cardiac Electrophysiologist, [facility] [place]    of Medicine, [facility]  Director of Atrial Fibrillation Ablation, [facility]  Director of Ventricular Arrhythmias Research, [facility]  Office Phone Number: [phone]

## 2025-01-09 ENCOUNTER — CLINICAL SUPPORT (OUTPATIENT)
Dept: PREADMISSION TESTING | Facility: HOSPITAL | Age: 75
End: 2025-01-09
Payer: MEDICARE

## 2025-01-09 VITALS — BODY MASS INDEX: 20.52 KG/M2 | WEIGHT: 143 LBS

## 2025-01-09 NOTE — PREPROCEDURE INSTRUCTIONS

## 2025-04-22 ENCOUNTER — APPOINTMENT (OUTPATIENT)
Dept: NEUROLOGY | Facility: CLINIC | Age: 75
End: 2025-04-22
Payer: MEDICARE

## 2025-04-30 ENCOUNTER — APPOINTMENT (OUTPATIENT)
Dept: CARDIOLOGY | Facility: CLINIC | Age: 75
End: 2025-04-30
Payer: MEDICARE

## 2025-05-09 ENCOUNTER — APPOINTMENT (OUTPATIENT)
Dept: PRIMARY CARE | Facility: CLINIC | Age: 75
End: 2025-05-09
Payer: MEDICARE

## 2025-09-08 ENCOUNTER — APPOINTMENT (OUTPATIENT)
Dept: CARDIOLOGY | Facility: CLINIC | Age: 75
End: 2025-09-08
Payer: MEDICARE